# Patient Record
Sex: MALE | Race: WHITE | NOT HISPANIC OR LATINO | Employment: OTHER | URBAN - NONMETROPOLITAN AREA
[De-identification: names, ages, dates, MRNs, and addresses within clinical notes are randomized per-mention and may not be internally consistent; named-entity substitution may affect disease eponyms.]

---

## 2017-01-06 ENCOUNTER — HOSPITAL ENCOUNTER (EMERGENCY)
Facility: HOSPITAL | Age: 77
Discharge: HOME | End: 2017-01-06
Attending: EMERGENCY MEDICINE | Admitting: EMERGENCY MEDICINE

## 2017-01-06 ENCOUNTER — APPOINTMENT (OUTPATIENT)
Dept: ULTRASOUND IMAGING | Facility: HOSPITAL | Age: 77
End: 2017-01-06

## 2017-01-06 ENCOUNTER — APPOINTMENT (OUTPATIENT)
Dept: GENERAL RADIOLOGY | Facility: HOSPITAL | Age: 77
End: 2017-01-06

## 2017-01-06 VITALS
WEIGHT: 176.6 LBS | RESPIRATION RATE: 20 BRPM | HEIGHT: 73 IN | SYSTOLIC BLOOD PRESSURE: 191 MMHG | TEMPERATURE: 98.4 F | OXYGEN SATURATION: 94 % | DIASTOLIC BLOOD PRESSURE: 74 MMHG | HEART RATE: 61 BPM | BODY MASS INDEX: 23.4 KG/M2

## 2017-01-06 DIAGNOSIS — N43.3 HYDROCELE, LEFT: ICD-10-CM

## 2017-01-06 DIAGNOSIS — N45.1 EPIDIDYMITIS, LEFT: ICD-10-CM

## 2017-01-06 DIAGNOSIS — N50.812 LEFT TESTICULAR PAIN: Primary | ICD-10-CM

## 2017-01-06 LAB
BACTERIA UR QL AUTO: ABNORMAL /HPF
BILIRUB UR QL STRIP: NEGATIVE
CLARITY UR: ABNORMAL
COLOR UR: YELLOW
GLUCOSE UR STRIP-MCNC: NEGATIVE MG/DL
HGB UR QL STRIP.AUTO: ABNORMAL
HYALINE CASTS UR QL AUTO: ABNORMAL /LPF
KETONES UR QL STRIP: NEGATIVE
LEUKOCYTE ESTERASE UR QL STRIP.AUTO: ABNORMAL
NITRITE UR QL STRIP: POSITIVE
PH UR STRIP.AUTO: <=5 [PH] (ref 5–8)
PROT UR QL STRIP: ABNORMAL
RBC # UR: ABNORMAL /HPF
REF LAB TEST METHOD: ABNORMAL
SP GR UR STRIP: 1.02 (ref 1–1.03)
SQUAMOUS #/AREA URNS HPF: ABNORMAL /HPF
UROBILINOGEN UR QL STRIP: ABNORMAL
WBC UR QL AUTO: ABNORMAL /HPF

## 2017-01-06 PROCEDURE — 87186 SC STD MICRODIL/AGAR DIL: CPT | Performed by: EMERGENCY MEDICINE

## 2017-01-06 PROCEDURE — 87088 URINE BACTERIA CULTURE: CPT | Performed by: EMERGENCY MEDICINE

## 2017-01-06 PROCEDURE — 74450 X-RAY URETHRA/BLADDER: CPT

## 2017-01-06 PROCEDURE — 87086 URINE CULTURE/COLONY COUNT: CPT | Performed by: EMERGENCY MEDICINE

## 2017-01-06 PROCEDURE — 81001 URINALYSIS AUTO W/SCOPE: CPT | Performed by: EMERGENCY MEDICINE

## 2017-01-06 PROCEDURE — 76870 US EXAM SCROTUM: CPT

## 2017-01-06 PROCEDURE — 99283 EMERGENCY DEPT VISIT LOW MDM: CPT

## 2017-01-06 PROCEDURE — 25010000002 CEFTRIAXONE PER 250 MG: Performed by: EMERGENCY MEDICINE

## 2017-01-06 PROCEDURE — 96372 THER/PROPH/DIAG INJ SC/IM: CPT

## 2017-01-06 RX ORDER — CEFTRIAXONE 500 MG/1
250 INJECTION, POWDER, FOR SOLUTION INTRAMUSCULAR; INTRAVENOUS ONCE
Status: COMPLETED | OUTPATIENT
Start: 2017-01-06 | End: 2017-01-06

## 2017-01-06 RX ORDER — SULFAMETHOXAZOLE AND TRIMETHOPRIM 800; 160 MG/1; MG/1
1 TABLET ORAL 2 TIMES DAILY
Qty: 14 TABLET | Refills: 0 | Status: SHIPPED | OUTPATIENT
Start: 2017-01-06 | End: 2017-01-13

## 2017-01-06 RX ORDER — DOXYCYCLINE 100 MG/1
100 CAPSULE ORAL 2 TIMES DAILY
Qty: 20 CAPSULE | Refills: 0 | Status: SHIPPED | OUTPATIENT
Start: 2017-01-06 | End: 2017-01-16

## 2017-01-06 RX ORDER — KETOROLAC TROMETHAMINE 10 MG/1
10 TABLET, FILM COATED ORAL EVERY 6 HOURS PRN
Qty: 8 TABLET | Refills: 0 | Status: SHIPPED | OUTPATIENT
Start: 2017-01-06 | End: 2017-01-20

## 2017-01-06 RX ORDER — LIDOCAINE HYDROCHLORIDE 10 MG/ML
0.9 INJECTION, SOLUTION EPIDURAL; INFILTRATION; INTRACAUDAL; PERINEURAL ONCE
Status: DISCONTINUED | OUTPATIENT
Start: 2017-01-06 | End: 2017-01-06 | Stop reason: HOSPADM

## 2017-01-06 RX ADMIN — CEFTRIAXONE SODIUM 250 MG: 500 INJECTION, POWDER, FOR SOLUTION INTRAMUSCULAR; INTRAVENOUS at 20:50

## 2017-01-06 NOTE — ED NOTES
Pt provided with urinal and informed that ultrasound has been called in     Cindi Mueller, DEREK  01/06/17 [id number]

## 2017-01-06 NOTE — ED PROVIDER NOTES
"Subjective   HPI Comments: Patient is a 76-year-old male who reports on Wednesday (01/04/2017) at 4 AM he was trying to sit down in a chair but he missed the chair and \"crushed\" his left testicle.  He reports he has had left testicular pain since then.  He reports he began having swelling of the left testicle this a.m.  He reports this causes him to have pain to urinate.      History provided by:  Patient      Review of Systems   Constitutional: Negative.    HENT: Negative.    Eyes: Negative.    Respiratory: Negative.    Cardiovascular: Negative.    Gastrointestinal: Negative.    Endocrine: Negative.    Genitourinary: Positive for scrotal swelling and testicular pain.        Pain with urination.   Musculoskeletal: Negative.    Skin: Positive for rash (Chronic facial rash).   Allergic/Immunologic: Negative.    Neurological: Negative.    Hematological: Negative.    Psychiatric/Behavioral: Negative.    All other systems reviewed and are negative.      Past Medical History   Diagnosis Date   • Coronary artery disease        No Known Allergies    History reviewed. No pertinent past surgical history.    History reviewed. No pertinent family history.    Social History     Social History   • Marital status:      Spouse name: N/A   • Number of children: N/A   • Years of education: N/A     Social History Main Topics   • Smoking status: Never Smoker   • Smokeless tobacco: None   • Alcohol use Defer   • Drug use: Defer   • Sexual activity: Not Asked     Other Topics Concern   • None     Social History Narrative   • None           Objective   Physical Exam   Constitutional: He appears well-developed and well-nourished.   HENT:   Head: Normocephalic and atraumatic.   Right Ear: External ear normal.   Left Ear: External ear normal.   Nose: Nose normal.   Mouth/Throat: Oropharynx is clear and moist.   Eyes: Pupils are equal, round, and reactive to light. Right eye exhibits no discharge. Left eye exhibits no discharge. "   Neck: Normal range of motion. Neck supple.   Cardiovascular: Normal rate, regular rhythm, normal heart sounds and intact distal pulses.    Pulmonary/Chest: Effort normal and breath sounds normal.   Abdominal: Soft. He exhibits no distension. There is no tenderness.   Genitourinary: Penis normal.   Genitourinary Comments: Left testicle is swollen and painful to touch.  Uncircumcised penis.   Musculoskeletal: Normal range of motion. He exhibits no deformity.   Neurological: He is alert.   Skin: Skin is warm. Rash (Rash on face.) noted.   Psychiatric: He has a normal mood and affect. Judgment normal.   Nursing note and vitals reviewed.      Procedures         ED Course  ED Course   Comment By Time   Discussed patient's case with Dr. Clay (Urologist on call) and he recommends the patient have a retrograde urethrogram--and if that is WNL then discharge the patient with anti-inflammatory & antibiotic medications. Rolando Parks MD 01/06 1849                  MDM  Number of Diagnoses or Management Options  Epididymitis, left: new and requires workup  Hydrocele, left: minor  Left testicular pain: new and requires workup     Amount and/or Complexity of Data Reviewed  Clinical lab tests: ordered and reviewed  Tests in the radiology section of CPT®: ordered and reviewed  Discuss the patient with other providers: yes    Risk of Complications, Morbidity, and/or Mortality  Presenting problems: low  Diagnostic procedures: low  Management options: low    Patient Progress  Patient progress: stable      Final diagnoses:   Left testicular pain   Epididymitis, left   Hydrocele, left            Rolando Parks MD  01/06/17 2029

## 2017-01-07 NOTE — ED NOTES
Radiology called in regards to [place] order, radiology stated that dr pitts would have to call the radiologist about order. Dr pitts notified     Cindi Mueller RN  01/06/17 0037

## 2017-01-08 LAB — BACTERIA SPEC AEROBE CULT: ABNORMAL

## 2017-01-20 ENCOUNTER — OFFICE VISIT (OUTPATIENT)
Dept: UROLOGY | Facility: CLINIC | Age: 77
End: 2017-01-20

## 2017-01-20 VITALS
BODY MASS INDEX: 23.46 KG/M2 | SYSTOLIC BLOOD PRESSURE: 128 MMHG | HEIGHT: 73 IN | WEIGHT: 177 LBS | DIASTOLIC BLOOD PRESSURE: 50 MMHG | TEMPERATURE: 98 F

## 2017-01-20 DIAGNOSIS — N43.3 HYDROCELE, UNSPECIFIED HYDROCELE TYPE: Primary | ICD-10-CM

## 2017-01-20 DIAGNOSIS — N40.1 BENIGN NON-NODULAR PROSTATIC HYPERPLASIA WITH LOWER URINARY TRACT SYMPTOMS: ICD-10-CM

## 2017-01-20 LAB
BILIRUB BLD-MCNC: ABNORMAL MG/DL
CLARITY, POC: CLEAR
COLOR UR: YELLOW
GLUCOSE UR STRIP-MCNC: NEGATIVE MG/DL
KETONES UR QL: ABNORMAL
LEUKOCYTE EST, POC: NEGATIVE
NITRITE UR-MCNC: NEGATIVE MG/ML
PH UR: 5.5 [PH] (ref 5–8)
PROT UR STRIP-MCNC: ABNORMAL MG/DL
RBC # UR STRIP: NEGATIVE /UL
SP GR UR: 1.03 (ref 1–1.03)
UROBILINOGEN UR QL: ABNORMAL

## 2017-01-20 PROCEDURE — 81003 URINALYSIS AUTO W/O SCOPE: CPT | Performed by: UROLOGY

## 2017-01-20 PROCEDURE — 51798 US URINE CAPACITY MEASURE: CPT | Performed by: UROLOGY

## 2017-01-20 PROCEDURE — 99204 OFFICE O/P NEW MOD 45 MIN: CPT | Performed by: UROLOGY

## 2017-01-20 RX ORDER — LOSARTAN POTASSIUM 50 MG/1
50 TABLET ORAL DAILY
COMMUNITY

## 2017-01-20 RX ORDER — PANTOPRAZOLE SODIUM 40 MG/1
40 TABLET, DELAYED RELEASE ORAL DAILY
COMMUNITY

## 2017-01-20 RX ORDER — SIMVASTATIN 40 MG
40 TABLET ORAL NIGHTLY
COMMUNITY

## 2017-01-20 RX ORDER — ASPIRIN 325 MG
325 TABLET ORAL DAILY
COMMUNITY
End: 2019-05-20

## 2017-01-20 RX ORDER — CITALOPRAM 40 MG/1
40 TABLET ORAL DAILY
COMMUNITY

## 2017-01-20 RX ORDER — LEVOTHYROXINE SODIUM 0.12 MG/1
125 TABLET ORAL DAILY
COMMUNITY

## 2017-01-20 RX ORDER — TAMSULOSIN HYDROCHLORIDE 0.4 MG/1
1 CAPSULE ORAL DAILY
Qty: 30 CAPSULE | Refills: 11 | Status: SHIPPED | OUTPATIENT
Start: 2017-01-20

## 2017-01-20 RX ORDER — ATENOLOL 50 MG/1
50 TABLET ORAL DAILY
COMMUNITY

## 2017-01-20 RX ORDER — TERAZOSIN 5 MG/1
5 CAPSULE ORAL NIGHTLY
COMMUNITY
End: 2017-01-20

## 2017-01-20 NOTE — PROGRESS NOTES
Mr. Riggs is 76 y.o. male    Chief Complaint   Patient presents with   • Testicle Pain       Testicle Pain   The patient's primary symptoms include testicular pain. The patient's pertinent negatives include no penile pain. This is a new problem. The current episode started more than 1 month ago. The problem occurs constantly. The problem has been unchanged. The pain is medium. Associated symptoms include frequency, headaches and urgency. Pertinent negatives include no abdominal pain, chest pain, chills, coughing, dysuria, fever, rash, shortness of breath or sore throat. There is a testicular injury. The problem affects the left side. The injury mechanism was a direct blow to genitals. The testicular pain affects the left testicle. There is swelling in the left testicle. The color of the testicles is normal. The symptoms are aggravated by activity and tactile pressure. He has tried antibiotics for the symptoms. The treatment provided no relief. He is not sexually active. His past medical history is significant for BPH.   Benign Prostatic Hypertrophy   This is a chronic problem. The current episode started more than 1 year ago. The problem has been gradually worsening since onset. Irritative symptoms include frequency and urgency. Obstructive symptoms include a weak stream. Pertinent negatives include no chills, dysuria or hematuria. AUA score is 8-19 (17). He is not sexually active. Nothing aggravates the symptoms. Past treatments include terazosin. The treatment provided mild relief. He has been using treatment for 2 or more years.       The following portions of the patient's history were reviewed and updated as appropriate: allergies, current medications, past family history, past medical history, past social history, past surgical history and problem list.    Review of Systems   Constitutional: Negative for chills and fever.   HENT: Negative for congestion and sore throat.    Eyes: Negative for pain and  itching.   Respiratory: Negative for cough and shortness of breath.    Cardiovascular: Negative for chest pain.   Gastrointestinal: Negative for abdominal pain, anal bleeding and blood in stool.   Endocrine: Negative for cold intolerance and heat intolerance.   Genitourinary: Positive for frequency, genital sores, testicular pain and urgency. Negative for difficulty urinating, dysuria, hematuria, penile pain and penile swelling.   Musculoskeletal: Negative for back pain and neck pain.   Skin: Negative for color change and rash.   Allergic/Immunologic: Negative for food allergies.   Neurological: Positive for headaches. Negative for dizziness.   Hematological: Does not bruise/bleed easily.   Psychiatric/Behavioral: Negative for confusion and sleep disturbance.         Current Outpatient Prescriptions:   •  aspirin 325 MG tablet, Take 325 mg by mouth Daily., Disp: , Rfl:   •  atenolol (TENORMIN) 50 MG tablet, Take 50 mg by mouth Daily., Disp: , Rfl:   •  citalopram (CeleXA) 40 MG tablet, Take 40 mg by mouth Daily., Disp: , Rfl:   •  levothyroxine (SYNTHROID, LEVOTHROID) 125 MCG tablet, Take 125 mcg by mouth Daily., Disp: , Rfl:   •  losartan (COZAAR) 50 MG tablet, Take 50 mg by mouth Daily., Disp: , Rfl:   •  pantoprazole (PROTONIX) 40 MG EC tablet, Take 40 mg by mouth Daily., Disp: , Rfl:   •  simvastatin (ZOCOR) 40 MG tablet, Take 40 mg by mouth Every Night., Disp: , Rfl:   •  tamsulosin (FLOMAX) 0.4 MG capsule 24 hr capsule, Take 1 capsule by mouth Daily., Disp: 30 capsule, Rfl: 11    Past Medical History   Diagnosis Date   • Coronary artery disease    • Hypertension        Past Surgical History   Procedure Laterality Date   • Gallbladder surgery     • Back surgery         Social History     Social History   • Marital status:      Spouse name: N/A   • Number of children: N/A   • Years of education: N/A     Social History Main Topics   • Smoking status: Former Smoker   • Smokeless tobacco: None   • Alcohol  "use Yes      Comment: rarely    • Drug use: Defer   • Sexual activity: Not Asked     Other Topics Concern   • None     Social History Narrative       Family History   Problem Relation Age of Onset   • No Known Problems Father    • No Known Problems Mother        Objective    Visit Vitals   • /50   • Temp 98 °F (36.7 °C)   • Ht 73\" (185.4 cm)   • Wt 177 lb (80.3 kg)   • BMI 23.35 kg/m2       Physical Exam   Constitutional: He is oriented to person, place, and time. He appears well-developed and well-nourished. No distress.   HENT:   Nose: Nose normal.   Neck: Normal range of motion. Neck supple. No tracheal deviation present. No thyromegaly present.   Cardiovascular: Normal rate, regular rhythm and intact distal pulses.    No significant edema or tenderness    Pulmonary/Chest: Breath sounds normal. No accessory muscle usage. No respiratory distress.   Abdominal: Soft. Bowel sounds are normal. He exhibits no distension, no ascites and no mass. There is no hepatosplenomegaly. There is no tenderness. There is no rebound, no guarding and no CVA tenderness. No hernia.   Stool specimen is not indicated for my portion of the exam   Genitourinary: Penis normal. Rectal exam shows no mass, no tenderness, anal tone normal and guaiac negative stool. Tender: no nodules. Right testis shows no mass, no swelling and no tenderness. Left testis shows swelling and tenderness. Left testis shows no mass. No penile tenderness (no lesion or deformities).   Genitourinary Comments:  The urethral meatus normal in position without evidence of stricture.  Right epididymis and vasa within normal limits.  Left epididymis is not palpable due to the hydrocele.  He does have some tenderness along the cord.  Anus and perineum without mass or tenderness. The seminal vesicle are without mass or enlargement. The prostate is approximately 50 ml. It is Symmetric, with a Soft consistency. There are no nodules present.      Lymphadenopathy:     He has " no cervical adenopathy. No inguinal adenopathy noted on the right or left side.        Right: No inguinal adenopathy present.        Left: No inguinal adenopathy present.   Neurological: He is alert and oriented to person, place, and time.   Skin: Skin is warm and dry. No rash noted. He is not diaphoretic. No pallor.   Psychiatric: He has a normal mood and affect. His behavior is normal.   Vitals reviewed.      Admission on 01/06/2017, Discharged on 01/06/2017   Component Date Value Ref Range Status   • Color, UA 01/06/2017 Yellow  Yellow, Straw Final   • Appearance, UA 01/06/2017 Turbid* Clear Final   • pH, UA 01/06/2017 <=5.0  5.0 - 8.0 Final   • Specific Gravity, UA 01/06/2017 1.020  1.005 - 1.030 Final   • Glucose, UA 01/06/2017 Negative  Negative Final   • Ketones, UA 01/06/2017 Negative  Negative Final   • Bilirubin, UA 01/06/2017 Negative  Negative Final   • Blood, UA 01/06/2017 Large (3+)* Negative Final   • Protein, UA 01/06/2017 30 mg/dL (1+)* Negative Final   • Leuk Esterase, UA 01/06/2017 Large (3+)* Negative Final   • Nitrite, UA 01/06/2017 Positive* Negative Final   • Urobilinogen, UA 01/06/2017 1.0 E.U./dL  0.2 - 1.0 E.U./dL Final   • RBC, UA 01/06/2017 None Seen  None Seen /HPF Final   • WBC, UA 01/06/2017 Too Numerous to Count* None Seen /HPF Final   • Bacteria, UA 01/06/2017 Trace* None Seen /HPF Final   • Squamous Epithelial Cells, UA 01/06/2017 None Seen  None Seen, 0-2 /HPF Final   • Hyaline Casts, UA 01/06/2017 None Seen  None Seen /LPF Final   • Methodology 01/06/2017 Automated Microscopy   Final   • Urine Culture 01/06/2017 >100,000 CFU/mL Escherichia coli*  Final       Results for orders placed or performed in visit on 01/20/17   POC Urinalysis Dipstick, Automated   Result Value Ref Range    Color Yellow Yellow, Straw, Dark Yellow, Chanel    Clarity, UA Clear Clear    Glucose, UA Negative Negative, 1000 mg/dL (3+) mg/dL    Bilirubin Small (1+) (A) Negative    Ketones, UA Trace (A) Negative     Specific Gravity  1.030 1.005 - 1.030    Blood, UA Negative Negative    pH, Urine 5.5 5.0 - 8.0    Protein,  mg/dL (A) Negative mg/dL    Urobilinogen, UA 1 mg/dL (A) Normal    Leukocytes Negative Negative    Nitrite, UA Negative Negative     Assessment and Plan    Rajinder was seen today for testicle pain.    Diagnoses and all orders for this visit:    Hydrocele, unspecified hydrocele type  -     POC Urinalysis Dipstick, Automated  I reviewed his outside imaging as below.  I do believe what has happened to him is that he had a injury to the left scrotum for fall on a chair which caused a an inflammatory epididymal orchitis with a resulting hydrocele.  He continues to have pain despite antibiotics.  I discussed with him that I do not think antibiotics will be helpful for him in this situation and that he needs to be taking nonsteroidal anti-inflammatories.  I have given her instructions to take ibuprofen 400 mg 3 times a day for the next 3 weeks.  As a GI prophylaxis he will take Pepcid 20 mg daily.  I do think this will help with his pain.  More than likely the hydrocele will not resolve and if this becomes an issue with him we can deal with this surgically in the future    Benign non-nodular prostatic hyperplasia with lower urinary tract symptoms  -     tamsulosin (FLOMAX) 0.4 MG capsule 24 hr capsule; Take 1 capsule by mouth Daily.  He is still having some complaints of lower urinary tract symptoms which I think are due to his BPH.  He has been on Turner in for quite some time.  He states that he does occasionally get some dizziness and some somewhat hesitant to go up on this as this is a nonselective alpha-blocker.  I would like to put him on a more specific selective alpha A1A blocker and will start him on Flomax.  He does empty his bladder well with a bladder scan of 0    He and I discussed BPH today including the pathophysiology, diagnosis, and management. The role of PSA in management of PSA is  discussed.  The patient understands the purpose of a uroflow, post void residual and the need for cystoscopy. We discussed the role of Alpha blockers, 5-Alpha redcuctase inhibitors, and anticholinergics. Minimally invasive techniques including TUNA, TUMT, Interstitial laser, and WIT are considered. TUIP, TURP, & Laser ablation are also explained as more invasive techniques. TURP is acknowledged to be the gold standard for surgical management. Behavioral, dietary, and herbal therapy are also discussed pointing out the limited available data for the latter. Based upon his symptomatology, we have elected to begin a trial of alpha blockade. The risks of orthostasis, central mediated dizziness, ejaculatory dysfunction, reflux, & rhinitis are discussed with the patient.     Scrotal ultrasound independent review    The scrotal ultrasound is available for me to review.  Treatment recommendations require an independent review.  This film has been reviewed by the radiologist to determine any non urologic abnormalities that are presents. Results are as follows:    RIGHT    Testis:  Normal in size and echotexture, without focal lesion    Epididymis:  Normal in size and echotexture, without focal lesion    Hydrocele:  Small physiologic hydrocele    Varicocele:  No evidence of varicocele      Left    Testis:  Heterogenous echotexture consistent with orchitis    Epididymis:  Heterogeneous echotecture and mild enlargement consistent with epididymitis    Hydrocele:  Moderate hydrocele    Varicocele:  No evidence of varicocele        EMR Dragon/Transcription disclaimer:  Much of this encounter note is an electronic transcription/translation of spoken language to printed text. The electronic translation of spoken language may permit erroneous, or at times, nonsensical words or phrases to be inadvertently transcribed; although I have reviewed the note for such errors, some may still exist.     Estimation of residual urine via abdominal  ultrasound  Residual Urine: 0 ml  Indication:   Position: Supine  Examination: Incremental scanning of the suprapubic area using 3 MHz transducer using copious amounts of acoustic gel.   Findings: An anechoic area was demonstrated which represented the bladder, with measurement of residual urine as noted. I inspected this myself. In that the residual urine was stable or insignificant, no treatment will be necessary at this time.

## 2017-01-20 NOTE — LETTER
January 20, 2017     LUCIANO Baez  [address]  [place] [address]    Patient: Rajinder Riggs   YOB: 1940   Date of Visit: 1/20/2017       Dear LUCIANO Delgado:    Thank you for referring Rajinder Riggs to me for evaluation. Below are the relevant portions of my assessment and plan of care.    If you have questions, please do not hesitate to call me. I look forward to following Rajinder along with you.         Sincerely,        Ze Clay MD        CC: No Recipients  Ze Clay MD  1/20/2017  4:13 PM  Signed  Mr. Riggs is 76 y.o. male    Chief Complaint   Patient presents with   • Testicle Pain       Testicle Pain   The patient's primary symptoms include testicular pain. The patient's pertinent negatives include no penile pain. This is a new problem. The current episode started more than 1 month ago. The problem occurs constantly. The problem has been unchanged. The pain is medium. Associated symptoms include frequency, headaches and urgency. Pertinent negatives include no abdominal pain, chest pain, chills, coughing, dysuria, fever, rash, shortness of breath or sore throat. There is a testicular injury. The problem affects the left side. The injury mechanism was a direct blow to genitals. The testicular pain affects the left testicle. There is swelling in the left testicle. The color of the testicles is normal. The symptoms are aggravated by activity and tactile pressure. He has tried antibiotics for the symptoms. The treatment provided no relief. He is not sexually active. His past medical history is significant for BPH.   Benign Prostatic Hypertrophy   This is a chronic problem. The current episode started more than 1 year ago. The problem has been gradually worsening since onset. Irritative symptoms include frequency and urgency. Obstructive symptoms include a weak stream. Pertinent negatives include no chills, dysuria or hematuria. AUA score is 8-19  (17). He is not sexually active. Nothing aggravates the symptoms. Past treatments include terazosin. The treatment provided mild relief. He has been using treatment for 2 or more years.       The following portions of the patient's history were reviewed and updated as appropriate: allergies, current medications, past family history, past medical history, past social history, past surgical history and problem list.    Review of Systems   Constitutional: Negative for chills and fever.   HENT: Negative for congestion and sore throat.    Eyes: Negative for pain and itching.   Respiratory: Negative for cough and shortness of breath.    Cardiovascular: Negative for chest pain.   Gastrointestinal: Negative for abdominal pain, anal bleeding and blood in stool.   Endocrine: Negative for cold intolerance and heat intolerance.   Genitourinary: Positive for frequency, genital sores, testicular pain and urgency. Negative for difficulty urinating, dysuria, hematuria, penile pain and penile swelling.   Musculoskeletal: Negative for back pain and neck pain.   Skin: Negative for color change and rash.   Allergic/Immunologic: Negative for food allergies.   Neurological: Positive for headaches. Negative for dizziness.   Hematological: Does not bruise/bleed easily.   Psychiatric/Behavioral: Negative for confusion and sleep disturbance.         Current Outpatient Prescriptions:   •  aspirin 325 MG tablet, Take 325 mg by mouth Daily., Disp: , Rfl:   •  atenolol (TENORMIN) 50 MG tablet, Take 50 mg by mouth Daily., Disp: , Rfl:   •  citalopram (CeleXA) 40 MG tablet, Take 40 mg by mouth Daily., Disp: , Rfl:   •  levothyroxine (SYNTHROID, LEVOTHROID) 125 MCG tablet, Take 125 mcg by mouth Daily., Disp: , Rfl:   •  losartan (COZAAR) 50 MG tablet, Take 50 mg by mouth Daily., Disp: , Rfl:   •  pantoprazole (PROTONIX) 40 MG EC tablet, Take 40 mg by mouth Daily., Disp: , Rfl:   •  simvastatin (ZOCOR) 40 MG tablet, Take 40 mg by mouth Every Night.,  "Disp: , Rfl:   •  tamsulosin (FLOMAX) 0.4 MG capsule 24 hr capsule, Take 1 capsule by mouth Daily., Disp: 30 capsule, Rfl: 11    Past Medical History   Diagnosis Date   • Coronary artery disease    • Hypertension        Past Surgical History   Procedure Laterality Date   • Gallbladder surgery     • Back surgery         Social History     Social History   • Marital status:      Spouse name: N/A   • Number of children: N/A   • Years of education: N/A     Social History Main Topics   • Smoking status: Former Smoker   • Smokeless tobacco: None   • Alcohol use Yes      Comment: rarely    • Drug use: Defer   • Sexual activity: Not Asked     Other Topics Concern   • None     Social History Narrative       Family History   Problem Relation Age of Onset   • No Known Problems Father    • No Known Problems Mother        Objective    Visit Vitals   • /50   • Temp 98 °F (36.7 °C)   • Ht 73\" (185.4 cm)   • Wt 177 lb (80.3 kg)   • BMI 23.35 kg/m2       Physical Exam   Constitutional: He is oriented to person, place, and time. He appears well-developed and well-nourished. No distress.   HENT:   Nose: Nose normal.   Neck: Normal range of motion. Neck supple. No tracheal deviation present. No thyromegaly present.   Cardiovascular: Normal rate, regular rhythm and intact distal pulses.    No significant edema or tenderness    Pulmonary/Chest: Breath sounds normal. No accessory muscle usage. No respiratory distress.   Abdominal: Soft. Bowel sounds are normal. He exhibits no distension, no ascites and no mass. There is no hepatosplenomegaly. There is no tenderness. There is no rebound, no guarding and no CVA tenderness. No hernia.   Stool specimen is not indicated for my portion of the exam   Genitourinary: Penis normal. Rectal exam shows no mass, no tenderness, anal tone normal and guaiac negative stool. Tender: no nodules. Right testis shows no mass, no swelling and no tenderness. Left testis shows swelling and tenderness. " Left testis shows no mass. No penile tenderness (no lesion or deformities).   Genitourinary Comments:  The urethral meatus normal in position without evidence of stricture.  Right epididymis and vasa within normal limits.  Left epididymis is not palpable due to the hydrocele.  He does have some tenderness along the cord.  Anus and perineum without mass or tenderness. The seminal vesicle are without mass or enlargement. The prostate is approximately 50 ml. It is Symmetric, with a Soft consistency. There are no nodules present.      Lymphadenopathy:     He has no cervical adenopathy. No inguinal adenopathy noted on the right or left side.        Right: No inguinal adenopathy present.        Left: No inguinal adenopathy present.   Neurological: He is alert and oriented to person, place, and time.   Skin: Skin is warm and dry. No rash noted. He is not diaphoretic. No pallor.   Psychiatric: He has a normal mood and affect. His behavior is normal.   Vitals reviewed.      Admission on 01/06/2017, Discharged on 01/06/2017   Component Date Value Ref Range Status   • Color, UA 01/06/2017 Yellow  Yellow, Straw Final   • Appearance, UA 01/06/2017 Turbid* Clear Final   • pH, UA 01/06/2017 <=5.0  5.0 - 8.0 Final   • Specific Gravity, UA 01/06/2017 1.020  1.005 - 1.030 Final   • Glucose, UA 01/06/2017 Negative  Negative Final   • Ketones, UA 01/06/2017 Negative  Negative Final   • Bilirubin, UA 01/06/2017 Negative  Negative Final   • Blood, UA 01/06/2017 Large (3+)* Negative Final   • Protein, UA 01/06/2017 30 mg/dL (1+)* Negative Final   • Leuk Esterase, UA 01/06/2017 Large (3+)* Negative Final   • Nitrite, UA 01/06/2017 Positive* Negative Final   • Urobilinogen, UA 01/06/2017 1.0 E.U./dL  0.2 - 1.0 E.U./dL Final   • RBC, UA 01/06/2017 None Seen  None Seen /HPF Final   • WBC, UA 01/06/2017 Too Numerous to Count* None Seen /HPF Final   • Bacteria, UA 01/06/2017 Trace* None Seen /HPF Final   • Squamous Epithelial Cells, UA  01/06/2017 None Seen  None Seen, 0-2 /HPF Final   • Hyaline Casts, UA 01/06/2017 None Seen  None Seen /LPF Final   • Methodology 01/06/2017 Automated Microscopy   Final   • Urine Culture 01/06/2017 >100,000 CFU/mL Escherichia coli*  Final       Results for orders placed or performed in visit on 01/20/17   POC Urinalysis Dipstick, Automated   Result Value Ref Range    Color Yellow Yellow, Straw, Dark Yellow, Chanel    Clarity, UA Clear Clear    Glucose, UA Negative Negative, 1000 mg/dL (3+) mg/dL    Bilirubin Small (1+) (A) Negative    Ketones, UA Trace (A) Negative    Specific Gravity  1.030 1.005 - 1.030    Blood, UA Negative Negative    pH, Urine 5.5 5.0 - 8.0    Protein,  mg/dL (A) Negative mg/dL    Urobilinogen, UA 1 mg/dL (A) Normal    Leukocytes Negative Negative    Nitrite, UA Negative Negative     Assessment and Plan    Rajinder was seen today for testicle pain.    Diagnoses and all orders for this visit:    Hydrocele, unspecified hydrocele type  -     POC Urinalysis Dipstick, Automated  I reviewed his outside imaging as below.  I do believe what has happened to him is that he had a injury to the left scrotum for fall on a chair which caused a an inflammatory epididymal orchitis with a resulting hydrocele.  He continues to have pain despite antibiotics.  I discussed with him that I do not think antibiotics will be helpful for him in this situation and that he needs to be taking nonsteroidal anti-inflammatories.  I have given her instructions to take ibuprofen 400 mg 3 times a day for the next 3 weeks.  As a GI prophylaxis he will take Pepcid 20 mg daily.  I do think this will help with his pain.  More than likely the hydrocele will not resolve and if this becomes an issue with him we can deal with this surgically in the future    Benign non-nodular prostatic hyperplasia with lower urinary tract symptoms  -     tamsulosin (FLOMAX) 0.4 MG capsule 24 hr capsule; Take 1 capsule by mouth Daily.  He is still  having some complaints of lower urinary tract symptoms which I think are due to his BPH.  He has been on Turner in for quite some time.  He states that he does occasionally get some dizziness and some somewhat hesitant to go up on this as this is a nonselective alpha-blocker.  I would like to put him on a more specific selective alpha A1A blocker and will start him on Flomax.  He does empty his bladder well with a bladder scan of 0    He and I discussed BPH today including the pathophysiology, diagnosis, and management. The role of PSA in management of PSA is discussed.  The patient understands the purpose of a uroflow, post void residual and the need for cystoscopy. We discussed the role of Alpha blockers, 5-Alpha redcuctase inhibitors, and anticholinergics. Minimally invasive techniques including TUNA, TUMT, Interstitial laser, and WIT are considered. TUIP, TURP, & Laser ablation are also explained as more invasive techniques. TURP is acknowledged to be the gold standard for surgical management. Behavioral, dietary, and herbal therapy are also discussed pointing out the limited available data for the latter. Based upon his symptomatology, we have elected to begin a trial of alpha blockade. The risks of orthostasis, central mediated dizziness, ejaculatory dysfunction, reflux, & rhinitis are discussed with the patient.     Scrotal ultrasound independent review    The scrotal ultrasound is available for me to review.  Treatment recommendations require an independent review.  This film has been reviewed by the radiologist to determine any non urologic abnormalities that are presents. Results are as follows:    RIGHT    Testis:  Normal in size and echotexture, without focal lesion    Epididymis:  Normal in size and echotexture, without focal lesion    Hydrocele:  Small physiologic hydrocele    Varicocele:  No evidence of varicocele      Left    Testis:  Heterogenous echotexture consistent with  orchitis    Epididymis:  Heterogeneous echotecture and mild enlargement consistent with epididymitis    Hydrocele:  Moderate hydrocele    Varicocele:  No evidence of varicocele        EMR Dragon/Transcription disclaimer:  Much of this encounter note is an electronic transcription/translation of spoken language to printed text. The electronic translation of spoken language may permit erroneous, or at times, nonsensical words or phrases to be inadvertently transcribed; although I have reviewed the note for such errors, some may still exist.     Estimation of residual urine via abdominal ultrasound  Residual Urine: 0 ml  Indication:   Position: Supine  Examination: Incremental scanning of the suprapubic area using 3 MHz transducer using copious amounts of acoustic gel.   Findings: An anechoic area was demonstrated which represented the bladder, with measurement of residual urine as noted. I inspected this myself. In that the residual urine was stable or insignificant, no treatment will be necessary at this time.

## 2017-01-20 NOTE — MR AVS SNAPSHOT
Rajinder Riggs   1/20/2017 2:20 PM   Office Visit    Dept Phone:  [phone]   Encounter #:  [id number]    Provider:  Ze Clay MD   Department:  [facility] UROLOGY                Your Full Care Plan              Today's Medication Changes          These changes are accurate as of: 1/20/17  3:14 PM.  If you have any questions, ask your nurse or doctor.               New Medication(s)Ordered:     tamsulosin 0.4 MG capsule 24 hr capsule   Commonly known as:  FLOMAX   Take 1 capsule by mouth Daily.   Started by:  Ze Clay MD         Stop taking medication(s)listed here:     ketorolac 10 MG tablet   Commonly known as:  TORADOL   Stopped by:  Ze Clay MD           terazosin 5 MG capsule   Commonly known as:  HYTRIN   Stopped by:  Ze Clay MD                Where to Get Your Medications      You can get these medications from any pharmacy     Bring a paper prescription for each of these medications     tamsulosin 0.4 MG capsule 24 hr capsule                  Your Updated Medication List          This list is accurate as of: 1/20/17  3:14 PM.  Always use your most recent med list.                aspirin 325 MG tablet       atenolol 50 MG tablet   Commonly known as:  TENORMIN       citalopram 40 MG tablet   Commonly known as:  CeleXA       levothyroxine 125 MCG tablet   Commonly known as:  SYNTHROID, LEVOTHROID       losartan 50 MG tablet   Commonly known as:  COZAAR       pantoprazole 40 MG EC tablet   Commonly known as:  PROTONIX       simvastatin 40 MG tablet   Commonly known as:  ZOCOR       tamsulosin 0.4 MG capsule 24 hr capsule   Commonly known as:  FLOMAX   Take 1 capsule by mouth Daily.               We Performed the Following     POC Urinalysis Dipstick, Automated       You Were Diagnosed With        Codes Comments    Hydrocele, unspecified hydrocele type    -  Primary ICD-10-CM: N43.3  ICD-9-CM: 603.9     Benign non-nodular  "prostatic hyperplasia with lower urinary tract symptoms     ICD-10-CM: N40.1  ICD-9-CM: 600.91       Instructions     None    Patient Instructions History      Upcoming Appointments     Visit Type Date Time Department    NEW PATIENT 2017  2:20 PM [facility]    FOLLOW UP 2017  3:50 PM [facility]      MyChart Signup     [facility] "[employer] allows you to send messages to your doctor, view your test results, renew your prescriptions, schedule appointments, and more. To sign up, go to [employer] and click on the Sign Up Now link in the New User? box. Enter your "[employer] Activation Code exactly as it appears below along with the last four digits of your Social Security Number and your Date of Birth () to complete the sign-up process. If you do not sign up before the expiration date, you must request a new code.    "[employer] Activation Code: [email or web address]  Expires: 2017  8:22 PM    If you have questions, you can email [email or web address] or call [phone] to talk to our [employer] staff. Remember, "[employer] is NOT to be used for urgent needs. For medical emergencies, dial 911.               Other Info from Your Visit           Your Appointments     2017  3:50 PM CST   Follow Up with Ze Clay MD   [facility] (--)    [address] [place] [id number] [phone]           Arrive 15 minutes prior to appointment.              Allergies     No Known Allergies      Vital Signs     Blood Pressure Temperature Height Weight Body Mass Index Smoking Status    128/50 98 °F (36.7 °C) 73\" (185.4 cm) 177 lb (80.3 kg) 23.35 kg/m2 Former Smoker      Problems and Diagnoses Noted     Fluid collection next to testicle    -  Primary    Benign non-nodular prostatic hyperplasia with lower urinary tract symptoms          Results     POC Urinalysis Dipstick, Automated      Component Value Standard Range & Units    Color Yellow " Yellow, Straw, Dark Yellow, Chanel    Clarity, UA Clear Clear    Glucose, UA Negative Negative, 1000 mg/dL (3+) mg/dL    Bilirubin Small (1+) Negative    Ketones, UA Trace Negative    Specific Gravity  1.030 1.005 - 1.030    Blood, UA Negative Negative    pH, Urine 5.5 5.0 - 8.0    Protein,  mg/dL Negative mg/dL    Urobilinogen, UA 1 mg/dL Normal    Leukocytes Negative Negative    Nitrite, UA Negative Negative

## 2017-02-17 ENCOUNTER — OFFICE VISIT (OUTPATIENT)
Dept: UROLOGY | Facility: CLINIC | Age: 77
End: 2017-02-17

## 2017-02-17 VITALS
BODY MASS INDEX: 23.56 KG/M2 | SYSTOLIC BLOOD PRESSURE: 152 MMHG | DIASTOLIC BLOOD PRESSURE: 58 MMHG | HEIGHT: 73 IN | TEMPERATURE: 97.7 F | WEIGHT: 177.8 LBS

## 2017-02-17 DIAGNOSIS — N40.1 BENIGN NON-NODULAR PROSTATIC HYPERPLASIA WITH LOWER URINARY TRACT SYMPTOMS: ICD-10-CM

## 2017-02-17 DIAGNOSIS — N43.3 HYDROCELE, UNSPECIFIED HYDROCELE TYPE: Primary | ICD-10-CM

## 2017-02-17 LAB
BILIRUB BLD-MCNC: NEGATIVE MG/DL
CLARITY, POC: CLEAR
COLOR UR: YELLOW
GLUCOSE UR STRIP-MCNC: NEGATIVE MG/DL
KETONES UR QL: NEGATIVE
LEUKOCYTE EST, POC: NEGATIVE
NITRITE UR-MCNC: NEGATIVE MG/ML
PH UR: 5.5 [PH] (ref 5–8)
PROT UR STRIP-MCNC: ABNORMAL MG/DL
RBC # UR STRIP: NEGATIVE /UL
SP GR UR: 1.03 (ref 1–1.03)
UROBILINOGEN UR QL: NORMAL

## 2017-02-17 PROCEDURE — 99213 OFFICE O/P EST LOW 20 MIN: CPT | Performed by: UROLOGY

## 2017-02-17 PROCEDURE — 51798 US URINE CAPACITY MEASURE: CPT | Performed by: UROLOGY

## 2017-02-17 PROCEDURE — 81003 URINALYSIS AUTO W/O SCOPE: CPT | Performed by: UROLOGY

## 2017-02-17 NOTE — PROGRESS NOTES
Mr. Riggs is 76 y.o. male    Chief Complaint   Patient presents with   • Hydrocele, unspecified hydrocele type   • Benign Prostatic Hypertrophy       Benign Prostatic Hypertrophy   This is a chronic problem. The current episode started more than 1 year ago. The problem has been gradually improving since onset. Irritative symptoms include frequency and urgency. Obstructive symptoms include a weak stream. Obstructive symptoms do not include an intermittent stream or straining. Pertinent negatives include no chills or hematuria. Nothing aggravates the symptoms. Past treatments include tamsulosin. The treatment provided moderate relief. He has been using treatment for 1 to 6 months.     She also for follow-up on hydrocele.  This is located in the left hemiscrotum.  This was initially described as painful.  The pain was sharp and aching.  This is resolved over the last few weeks.  Severity is now 0 to mild.  Duration is been several weeks.  Modifying factors to this point and then adding ibuprofen.  He has no associated dysuria.    The following portions of the patient's history were reviewed and updated as appropriate: allergies, current medications, past family history, past medical history, past social history, past surgical history and problem list.    Review of Systems   Constitutional: Negative for chills and fever.   Gastrointestinal: Negative for abdominal pain, anal bleeding and blood in stool.   Genitourinary: Positive for frequency and urgency. Negative for difficulty urinating, flank pain, hematuria, penile pain and penile swelling.         Current Outpatient Prescriptions:   •  aspirin 325 MG tablet, Take 325 mg by mouth Daily., Disp: , Rfl:   •  atenolol (TENORMIN) 50 MG tablet, Take 50 mg by mouth Daily., Disp: , Rfl:   •  citalopram (CeleXA) 40 MG tablet, Take 40 mg by mouth Daily., Disp: , Rfl:   •  levothyroxine (SYNTHROID, LEVOTHROID) 125 MCG tablet, Take 125 mcg by mouth Daily., Disp: , Rfl:   •   "losartan (COZAAR) 50 MG tablet, Take 50 mg by mouth Daily., Disp: , Rfl:   •  pantoprazole (PROTONIX) 40 MG EC tablet, Take 40 mg by mouth Daily., Disp: , Rfl:   •  simvastatin (ZOCOR) 40 MG tablet, Take 40 mg by mouth Every Night., Disp: , Rfl:   •  tamsulosin (FLOMAX) 0.4 MG capsule 24 hr capsule, Take 1 capsule by mouth Daily., Disp: 30 capsule, Rfl: 11    Past Medical History   Diagnosis Date   • Coronary artery disease    • Hypertension        Past Surgical History   Procedure Laterality Date   • Gallbladder surgery     • Back surgery         Social History     Social History   • Marital status:      Spouse name: N/A   • Number of children: N/A   • Years of education: N/A     Social History Main Topics   • Smoking status: Former Smoker   • Smokeless tobacco: None   • Alcohol use Yes      Comment: rarely    • Drug use: Defer   • Sexual activity: Not Asked     Other Topics Concern   • None     Social History Narrative       Family History   Problem Relation Age of Onset   • No Known Problems Father    • No Known Problems Mother        Objective    Visit Vitals   • /58   • Temp 97.7 °F (36.5 °C)   • Ht 73\" (185.4 cm)   • Wt 177 lb 12.8 oz (80.6 kg)   • BMI 23.46 kg/m2       Physical Exam   Genitourinary:   Genitourinary Comments: Normal right testicle.  Mild swelling in the left hemiscrotum consistent with small hydrocele which is nontender.       Office Visit on 01/20/2017   Component Date Value Ref Range Status   • Color 01/20/2017 Yellow  Yellow, Straw, Dark Yellow, Chanel Final   • Clarity, UA 01/20/2017 Clear  Clear Final   • Glucose, UA 01/20/2017 Negative  Negative, 1000 mg/dL (3+) mg/dL Final   • Bilirubin 01/20/2017 Small (1+)* Negative Final   • Ketones, UA 01/20/2017 Trace* Negative Final   • Specific Gravity  01/20/2017 1.030  1.005 - 1.030 Final   • Blood, UA 01/20/2017 Negative  Negative Final   • pH, Urine 01/20/2017 5.5  5.0 - 8.0 Final   • Protein, POC 01/20/2017 100 mg/dL* Negative " mg/dL Final   • Urobilinogen, UA 01/20/2017 1 mg/dL* Normal Final   • Leukocytes 01/20/2017 Negative  Negative Final   • Nitrite, UA 01/20/2017 Negative  Negative Final       Results for orders placed or performed in visit on 02/17/17   POC Urinalysis Dipstick, Automated   Result Value Ref Range    Color Yellow Yellow, Straw, Dark Yellow, Chanel    Clarity, UA Clear Clear    Glucose, UA Negative Negative, 1000 mg/dL (3+) mg/dL    Bilirubin Negative Negative    Ketones, UA Negative Negative    Specific Gravity  1.030 1.005 - 1.030    Blood, UA Negative Negative    pH, Urine 5.5 5.0 - 8.0    Protein,  mg/dL (A) Negative mg/dL    Urobilinogen, UA Normal Normal    Leukocytes Negative Negative    Nitrite, UA Negative Negative     Assessment and Plan    Rajinder was seen today for hydrocele, unspecified hydrocele type and benign prostatic hypertrophy.    Diagnoses and all orders for this visit:    Hydrocele, unspecified hydrocele type  -     POC Urinalysis Dipstick, Automated  Discomfort from his hydrocele has improved.  He still has some mild swelling in the left hemiscrotum.  We discussed options including continuing to monitor versus surgical intervention or percutaneous drainage.  At this point this is not significant bothersome to him and his pain has resolved and he would like to continue to watch which I think is an appropriate decision.    Benign non-nodular prostatic hyperplasia with lower urinary tract symptoms  He did get a significant amount of benefit from the Flomax and denies any dizziness or lightheadedness.  We will continue with this medication I will see him back in 6 months.    EMR Dragon/Transcription disclaimer:  Much of this encounter note is an electronic transcription/translation of spoken language to printed text. The electronic translation of spoken language may permit erroneous, or at times, nonsensical words or phrases to be inadvertently transcribed; although I have reviewed the note for  such errors, some may still exist.     Estimation of residual urine via abdominal ultrasound  Residual Urine: 31 ml  Indication: bph  Position: Supine  Examination: Incremental scanning of the suprapubic area using 3 MHz transducer using copious amounts of acoustic gel.   Findings: An anechoic area was demonstrated which represented the bladder, with measurement of residual urine as noted. I inspected this myself. In that the residual urine was stable or insignificant, no treatment will be necessary at this time.

## 2018-01-10 ENCOUNTER — OFFICE VISIT (OUTPATIENT)
Dept: GASTROENTEROLOGY | Facility: CLINIC | Age: 78
End: 2018-01-10

## 2018-01-10 VITALS
HEIGHT: 73 IN | DIASTOLIC BLOOD PRESSURE: 90 MMHG | BODY MASS INDEX: 24.65 KG/M2 | OXYGEN SATURATION: 97 % | HEART RATE: 61 BPM | WEIGHT: 186 LBS | SYSTOLIC BLOOD PRESSURE: 140 MMHG

## 2018-01-10 DIAGNOSIS — R10.30 LOWER ABDOMINAL PAIN: ICD-10-CM

## 2018-01-10 DIAGNOSIS — R19.4 CHANGE IN BOWEL HABITS: Primary | ICD-10-CM

## 2018-01-10 DIAGNOSIS — Z71.6 TOBACCO ABUSE COUNSELING: ICD-10-CM

## 2018-01-10 DIAGNOSIS — I10 HTN (HYPERTENSION), BENIGN: ICD-10-CM

## 2018-01-10 PROCEDURE — 99204 OFFICE O/P NEW MOD 45 MIN: CPT | Performed by: CLINICAL NURSE SPECIALIST

## 2018-01-10 NOTE — PROGRESS NOTES
Rajinder Riggs  1940    1/10/2018  Chief Complaint   Patient presents with   • GI Problem     Abdominal pain and bloating     Subjective   HPI  Rajinder Riggs is a 77 y.o. male who presents with a complaint of change in bowels over the past few months. He says that he has urgency to go and loose explosive BMs that are new for him. He says he will do this in the morning after a regular BM and then it will subside. No new medications. No NSAIDs. He does consume dairy and artificial sweeteners. [facility] and [facility]. He has had some generalized lower abdominal pain that is a cramp fairly constant like it is irritable all of the time he says. He denies any BRBPR. No melena. No fever chills or sweats. No wt loss. No upper GI complaints. No nausea or vomiting No upper GI related issues. When asked where the pain is he points to the lower abdomen. He rates it a 4 out of 10. No aggravating or alleviating factors.    Past Medical History:   Diagnosis Date   • Coronary artery disease    • Depression    • Diabetes mellitus    • Disease of thyroid gland    • Hypertension      Past Surgical History:   Procedure Laterality Date   • BACK SURGERY     • CAROTID ARTERY - SUBCLAVIAN ARTERY BYPASS GRAFT     • CHOLECYSTECTOMY     • COLONOSCOPY  09/28/2012    Normal exam   • GALLBLADDER SURGERY       Outpatient Prescriptions Marked as Taking for the 1/10/18 encounter (Office Visit) with BRIANA Sullivan   Medication Sig Dispense Refill   • aspirin 325 MG tablet Take 325 mg by mouth Daily.     • atenolol (TENORMIN) 50 MG tablet Take 50 mg by mouth Daily.     • citalopram (CeleXA) 40 MG tablet Take 40 mg by mouth Daily.     • levothyroxine (SYNTHROID, LEVOTHROID) 125 MCG tablet Take 125 mcg by mouth Daily.     • losartan (COZAAR) 50 MG tablet Take 50 mg by mouth Daily.     • pantoprazole (PROTONIX) 40 MG EC tablet Take 40 mg by mouth Daily.     • simvastatin (ZOCOR) 40 MG  tablet Take 40 mg by mouth Every Night.     • tamsulosin (FLOMAX) 0.4 MG capsule 24 hr capsule Take 1 capsule by mouth Daily. 30 capsule 11     No Known Allergies  Social History     Social History   • Marital status:      Spouse name: N/A   • Number of children: N/A   • Years of education: N/A     Occupational History   • Not on file.     Social History Main Topics   • Smoking status: Former Smoker   • Smokeless tobacco: Never Used   • Alcohol use Yes      Comment: rarely    • Drug use: Defer   • Sexual activity: Not on file     Other Topics Concern   • Not on file     Social History Narrative     Family History   Problem Relation Age of Onset   • No Known Problems Father    • No Known Problems Mother    • Colon cancer Neg Hx    • Colon polyps Neg Hx      Health Maintenance   Topic Date Due   • TDAP/TD VACCINES (1 - Tdap) 09/23/1959   • PNEUMOCOCCAL VACCINES (65+ LOW/MEDIUM RISK) (1 of 2 - PCV13) 09/23/2005   • INFLUENZA VACCINE  08/01/2017   • MEDICARE ANNUAL WELLNESS  12/05/2017   • ZOSTER VACCINE  12/05/2017     Review of Systems   Constitutional: Negative for activity change, appetite change, chills, diaphoresis, fatigue, fever and unexpected weight change.   HENT: Negative for ear pain, hearing loss, mouth sores, sore throat, trouble swallowing and voice change.    Eyes: Negative.    Respiratory: Negative for cough, choking, shortness of breath and wheezing.    Cardiovascular: Negative for chest pain and palpitations.   Gastrointestinal: Positive for abdominal pain and diarrhea. Negative for blood in stool, constipation, nausea and vomiting.   Endocrine: Negative for cold intolerance and heat intolerance.   Genitourinary: Negative for decreased urine volume, dysuria, frequency, hematuria and urgency.   Musculoskeletal: Negative for back pain, gait problem and myalgias.   Skin: Negative for color change, pallor and rash.   Allergic/Immunologic: Negative for food allergies and immunocompromised state.  "  Neurological: Negative for dizziness, tremors, seizures, syncope, weakness, light-headedness, numbness and headaches.   Hematological: Negative for adenopathy. Does not bruise/bleed easily.   Psychiatric/Behavioral: Negative for agitation and confusion. The patient is not nervous/anxious.    All other systems reviewed and are negative.    Objective   Vitals:    01/10/18 1324   BP: 140/90   Pulse: 61   SpO2: 97%   Weight: 84.4 kg (186 lb)   Height: 185.4 cm (73\")     Body mass index is 24.54 kg/(m^2).  Physical Exam   Constitutional: He is oriented to person, place, and time. He appears well-developed and well-nourished.   HENT:   Head: Normocephalic and atraumatic.   Eyes: Pupils are equal, round, and reactive to light.   Neck: Normal range of motion. Neck supple. No tracheal deviation present.   Cardiovascular: Normal rate, regular rhythm and normal heart sounds.  Exam reveals no gallop and no friction rub.    No murmur heard.  Pulmonary/Chest: Effort normal and breath sounds normal. No respiratory distress. He has no wheezes. He has no rales. He exhibits no tenderness.   Abdominal: Soft. Bowel sounds are normal. He exhibits no distension. There is no hepatosplenomegaly. There is no tenderness. There is no rigidity, no rebound and no guarding.   Musculoskeletal: Normal range of motion. He exhibits no edema, tenderness or deformity.   Neurological: He is alert and oriented to person, place, and time. He has normal reflexes.   Skin: Skin is warm and dry. No rash noted. No pallor.   Psychiatric: He has a normal mood and affect. His behavior is normal. Judgment and thought content normal.     Assessment/Plan   Rajinder was seen today for gi problem.    Diagnoses and all orders for this visit:    Change in bowel habits  -     Case Request; Standing  -     Implement Anesthesia Orders Day of Procedure; Standing  -     Obtain Informed Consent; Standing  -     Verify bowel prep was successful; Standing  -     Case " Request  -     polyethylene glycol (GoLYTELY) 236 g solution; Take as directed by office instructions.    Lower abdominal pain    Tobacco abuse counseling    HTN (hypertension), benign  Comments:  cont BP medication the am of procedure    I have suggested that he cut all dairy products out of his diet for approx 2-3 weeks to see if this helps his symptoms.   No artificial sweeteners.     COLONOSCOPY WITH ANESTHESIA (N/A)  EMR Dragon/transcription disclaimer: Much of this encounter note is electronic transcription/translation of spoken language to printed text. The electronic translation of spoken language may be erroneous, or at times, nonsensical words or phrases may be inadvertently transcribed. Although I have reviewed the note for such errors, some may still exist.  Body mass index is 24.54 kg/(m^2).  No Follow-up on file.    Patient's BMI is within normal parameters. No follow-up required.      All risks, benefits, alternatives, and indications of colonoscopy and/or Endoscopy procedure have been discussed with the patient. Risks to include perforation of the colon requiring possible surgery or colostomy, risk of bleeding from biopsies or removal of colon tissue, possibility of missing a colon polyp or cancer, or adverse drug reaction.  Benefits to include the diagnosis and management of disease of the colon and rectum. Alternatives to include barium enema, radiographic evaluation, lab testing or no intervention. Pt verbalizes understanding and agrees.     Olamide Gonzalez, APRN  1/10/2018  1:54 PM

## 2018-02-26 ENCOUNTER — OUTSIDE FACILITY SERVICE (OUTPATIENT)
Dept: GASTROENTEROLOGY | Facility: CLINIC | Age: 78
End: 2018-02-26

## 2018-02-26 ENCOUNTER — HOSPITAL ENCOUNTER (OUTPATIENT)
Dept: HOSPITAL 58 - SURG | Age: 78
Discharge: HOME | End: 2018-02-26
Attending: INTERNAL MEDICINE

## 2018-02-26 VITALS — TEMPERATURE: 98.6 F | DIASTOLIC BLOOD PRESSURE: 68 MMHG | SYSTOLIC BLOOD PRESSURE: 142 MMHG

## 2018-02-26 DIAGNOSIS — R19.4: Primary | ICD-10-CM

## 2018-02-26 DIAGNOSIS — K57.30: ICD-10-CM

## 2018-02-26 PROCEDURE — 45378 DIAGNOSTIC COLONOSCOPY: CPT | Performed by: INTERNAL MEDICINE

## 2019-05-20 ENCOUNTER — OFFICE VISIT (OUTPATIENT)
Dept: GASTROENTEROLOGY | Facility: CLINIC | Age: 79
End: 2019-05-20

## 2019-05-20 VITALS
DIASTOLIC BLOOD PRESSURE: 70 MMHG | WEIGHT: 175 LBS | BODY MASS INDEX: 23.09 KG/M2 | OXYGEN SATURATION: 98 % | SYSTOLIC BLOOD PRESSURE: 128 MMHG | HEART RATE: 77 BPM

## 2019-05-20 DIAGNOSIS — R19.7 DIARRHEA, UNSPECIFIED TYPE: ICD-10-CM

## 2019-05-20 DIAGNOSIS — I10 HTN (HYPERTENSION), BENIGN: ICD-10-CM

## 2019-05-20 DIAGNOSIS — R10.13 ABDOMINAL PAIN, EPIGASTRIC: Primary | ICD-10-CM

## 2019-05-20 DIAGNOSIS — Z71.6 TOBACCO ABUSE COUNSELING: ICD-10-CM

## 2019-05-20 PROCEDURE — 99214 OFFICE O/P EST MOD 30 MIN: CPT | Performed by: CLINICAL NURSE SPECIALIST

## 2019-05-20 RX ORDER — HYDROCHLOROTHIAZIDE 25 MG/1
25 TABLET ORAL DAILY
COMMUNITY

## 2019-05-20 RX ORDER — COLESEVELAM 180 1/1
625 TABLET ORAL 2 TIMES DAILY WITH MEALS
Qty: 60 TABLET | Refills: 10 | Status: SHIPPED | OUTPATIENT
Start: 2019-05-20 | End: 2019-06-12 | Stop reason: SDUPTHER

## 2019-05-20 NOTE — PROGRESS NOTES
Rajinder Riggs  1940 5/20/2019  Chief Complaint   Patient presents with   • GI Problem     Diarrhea      Subjective   HPI  Rajinder Riggs is a 78 y.o. male who presents with a complaint of epigastric pain. He says it is a persistent hurt to the epigastric region ongoing persistent for months. He rates it a 5 out of 10.  It is tender when he pushes on it. It radiates up through his esophagus. He is on Protonix for GERD. He says that this manages this fair. It has been worse lately as well. No nausea or vomiting. He has associated bloating and gas.  He says that he has lost weight however in 1/2017 I have him at 177 In Jan 2018 he was 186 now he is back down to 175. He has a loss in appetite, he doesn't eat as much as he used to.   He has chronic ongoing diarrhea that he has had for years. He has had a colonoscopy 2/26/2018 and this was normal. He is s/p cholecystectomy. He does consume artificial sweeteners and I have suggested he discontinue these he uses them often throughout the day.     Past Medical History:   Diagnosis Date   • Coronary artery disease    • Depression    • Diabetes mellitus (CMS/HCC)    • Disease of thyroid gland    • Hypertension      Past Surgical History:   Procedure Laterality Date   • BACK SURGERY     • CAROTID ARTERY - SUBCLAVIAN ARTERY BYPASS GRAFT     • CHOLECYSTECTOMY     • COLONOSCOPY  09/28/2012    Normal exam   • COLONOSCOPY  02/26/2018    Normal exam repeat prn   • GALLBLADDER SURGERY         Outpatient Medications Marked as Taking for the 5/20/19 encounter (Office Visit) with Olamide Gonzalez APRN   Medication Sig Dispense Refill   • atenolol (TENORMIN) 50 MG tablet Take 50 mg by mouth Daily.     • citalopram (CeleXA) 40 MG tablet Take 40 mg by mouth Daily.     • hydrochlorothiazide (HYDRODIURIL) 25 MG tablet Take 25 mg by mouth Daily.     • levothyroxine (SYNTHROID, LEVOTHROID) 125 MCG tablet Take 125 mcg by mouth Daily.     • losartan (COZAAR) 50 MG tablet Take  50 mg by mouth Daily.     • pantoprazole (PROTONIX) 40 MG EC tablet Take 40 mg by mouth Daily.     • simvastatin (ZOCOR) 40 MG tablet Take 40 mg by mouth Every Night.     • tamsulosin (FLOMAX) 0.4 MG capsule 24 hr capsule Take 1 capsule by mouth Daily. 30 capsule 11     No Known Allergies  Social History     Socioeconomic History   • Marital status:      Spouse name: Not on file   • Number of children: Not on file   • Years of education: Not on file   • Highest education level: Not on file   Tobacco Use   • Smoking status: Current Every Day Smoker     Packs/day: 1.00     Types: Cigarettes   • Smokeless tobacco: Never Used   Substance and Sexual Activity   • Alcohol use: No     Frequency: Never   • Drug use: Defer     Family History   Problem Relation Age of Onset   • No Known Problems Father    • No Known Problems Mother    • Colon cancer Neg Hx    • Colon polyps Neg Hx      Health Maintenance   Topic Date Due   • ANNUAL PHYSICAL  09/23/1943   • TDAP/TD VACCINES (1 - Tdap) 09/23/1959   • ZOSTER VACCINE (1 of 2) 09/23/1990   • PNEUMOCOCCAL VACCINES (65+ LOW/MEDIUM RISK) (1 of 2 - PCV13) 09/23/2005   • INFLUENZA VACCINE  08/01/2019     Review of Systems   Constitutional: Negative for activity change, appetite change, chills, diaphoresis, fatigue, fever and unexpected weight change.   HENT: Negative for ear pain, hearing loss, mouth sores, sore throat, trouble swallowing and voice change.    Eyes: Negative.    Respiratory: Negative for cough, choking, shortness of breath and wheezing.    Cardiovascular: Negative for chest pain and palpitations.   Gastrointestinal: Positive for abdominal pain and nausea. Negative for blood in stool, constipation, diarrhea and vomiting.   Endocrine: Negative for cold intolerance and heat intolerance.   Genitourinary: Negative for decreased urine volume, dysuria, frequency, hematuria and urgency.   Musculoskeletal: Negative for back pain, gait problem and myalgias.   Skin: Negative  for color change, pallor and rash.   Allergic/Immunologic: Negative for food allergies and immunocompromised state.   Neurological: Negative for dizziness, tremors, seizures, syncope, weakness, light-headedness, numbness and headaches.   Hematological: Negative for adenopathy. Does not bruise/bleed easily.   Psychiatric/Behavioral: Negative for agitation and confusion. The patient is not nervous/anxious.    All other systems reviewed and are negative.    Objective   Vitals:    05/20/19 1347   BP: 128/70   Pulse: 77   SpO2: 98%   Weight: 79.4 kg (175 lb)     Body mass index is 23.09 kg/m².  Physical Exam   Constitutional: He is oriented to person, place, and time. He appears well-developed and well-nourished.   HENT:   Head: Normocephalic and atraumatic.   Eyes: Pupils are equal, round, and reactive to light.   Neck: Normal range of motion. Neck supple. No tracheal deviation present.   Cardiovascular: Normal rate, regular rhythm and normal heart sounds. Exam reveals no gallop and no friction rub.   No murmur heard.  Pulmonary/Chest: Effort normal and breath sounds normal. No respiratory distress. He has no wheezes. He has no rales. He exhibits no tenderness.   Abdominal: Soft. Bowel sounds are normal. He exhibits no distension. There is no hepatosplenomegaly. There is tenderness (epigastric). There is no rigidity, no rebound and no guarding.   Musculoskeletal: Normal range of motion. He exhibits no edema, tenderness or deformity.   Neurological: He is alert and oriented to person, place, and time. He has normal reflexes.   Skin: Skin is warm and dry. No rash noted. No pallor.   Psychiatric: He has a normal mood and affect. His behavior is normal. Judgment and thought content normal.     Assessment/Plan   Rajinder was seen today for gi problem.    Diagnoses and all orders for this visit:    Abdominal pain, epigastric  -     Case Request; Standing  -     Follow Anesthesia Guidelines / Standing Orders; Future  -      Obtain Informed Consent; Future  -     Implement Anesthesia Orders Day of Procedure; Standing  -     Obtain Informed Consent; Standing  -     Case Request    Diarrhea, unspecified type  Comments:  for years suspect dietary and/or bile salt induced  Orders:  -     colesevelam (WELCHOL) 625 MG tablet; Take 1 tablet by mouth 2 (Two) Times a Day With Meals.    HTN (hypertension), benign  Comments:  cont BP medication the day of procedure    Tobacco abuse counseling    No NSAIDS  No artificial sweeteners  I discussed non pharmaceutical treatment of gerd.  This includes gradually losing weight to achieve ideal body wt., elevation of the head of bed by 4-6 inches, nothing to eat or drink 3 hours prior to lying down, avoiding tight clothing, stress reduction, tobacco cessation, reduction of alcohol intake, and dietary restrictions (avoiding caffeine, coffee, fatty foods, mints, chocolate, spicy foods and tomato based sauces as much as possible).      ESOPHAGOGASTRODUODENOSCOPY WITH ANESTHESIA (N/A)  EMR Dragon/transcription disclaimer: Much of this encounter note is electronic transcription/translation of spoken language to printed text. The electronic translation of spoken language may be erroneous, or at times, nonsensical words or phrases may be inadvertently transcribed. Although I have reviewed the note for such errors, some may still exist.  Body mass index is 23.09 kg/m².  Return in about 3 months (around 8/20/2019).    Patient's Body mass index is 23.09 kg/m². BMI is within normal parameters. No follow-up required..      All risks, benefits, alternatives, and indications of colonoscopy and/or Endoscopy procedure have been discussed with the patient. Risks to include perforation of the colon requiring possible surgery or colostomy, risk of bleeding from biopsies or removal of colon tissue, possibility of missing a colon polyp or cancer, or adverse drug reaction.  Benefits to include the diagnosis and management of  disease of the colon and rectum. Alternatives to include barium enema, radiographic evaluation, lab testing or no intervention. Pt verbalizes understanding and agrees.     Olamide Gonzalez, APRN  5/20/2019  2:15 PM      Obesity, Adult  Obesity is the condition of having too much total body fat. Being overweight or obese means that your weight is greater than what is considered healthy for your body size. Obesity is determined by a measurement called BMI. BMI is an estimate of body fat and is calculated from height and weight. For adults, a BMI of 30 or higher is considered obese.  Obesity can eventually lead to other health concerns and major illnesses, including:  · Stroke.  · Coronary artery disease (CAD).  · Type 2 diabetes.  · Some types of cancer, including cancers of the colon, breast, uterus, and gallbladder.  · Osteoarthritis.  · High blood pressure (hypertension).  · High cholesterol.  · Sleep apnea.  · Gallbladder stones.  · Infertility problems.  What are the causes?  The main cause of obesity is taking in (consuming) more calories than your body uses for energy. Other factors that contribute to this condition may include:  · Being born with genes that make you more likely to become obese.  · Having a medical condition that causes obesity. These conditions include:  ¨ Hypothyroidism.  ¨ Polycystic ovarian syndrome (PCOS).  ¨ Binge-eating disorder.  ¨ Cushing syndrome.  · Taking certain medicines, such as steroids, antidepressants, and seizure medicines.  · Not being physically active (sedentary lifestyle).  · Living where there are limited places to exercise safely or buy healthy foods.  · Not getting enough sleep.  What increases the risk?  The following factors may increase your risk of this condition:  · Having a family history of obesity.  · Being a woman of -American descent.  · Being a man of  descent.  What are the signs or symptoms?  Having excessive body fat is the main symptom  of this condition.  How is this diagnosed?  This condition may be diagnosed based on:  · Your symptoms.  · Your medical history.  · A physical exam. Your health care provider may measure:  ¨ Your BMI. If you are an adult with a BMI between 25 and less than 30, you are considered overweight. If you are an adult with a BMI of 30 or higher, you are considered obese.  ¨ The distances around your hips and your waist (circumferences). These may be compared to each other to help diagnose your condition.  ¨ Your skinfold thickness. Your health care provider may gently pinch a fold of your skin and measure it.  How is this treated?  Treatment for this condition often includes changing your lifestyle. Treatment may include some or all of the following:  · Dietary changes. Work with your health care provider and a dietitian to set a weight-loss goal that is healthy and reasonable for you. Dietary changes may include eating:  ¨ Smaller portions. A portion size is the amount of a particular food that is healthy for you to eat at one time. This varies from person to person.  ¨ Low-calorie or low-fat options.  ¨ More whole grains, fruits, and vegetables.  · Regular physical activity. This may include aerobic activity (cardio) and strength training.  · Medicine to help you lose weight. Your health care provider may prescribe medicine if you are unable to lose 1 pound a week after 6 weeks of eating more healthily and doing more physical activity.  · Surgery. Surgical options may include gastric banding and gastric bypass. Surgery may be done if:  ¨ Other treatments have not helped to improve your condition.  ¨ You have a BMI of 40 or higher.  ¨ You have life-threatening health problems related to obesity.  Follow these instructions at home:     Eating and drinking     · Follow recommendations from your health care provider about what you eat and drink. Your health care provider may advise you to:  ¨ Limit fast foods, sweets, and  processed snack foods.  ¨ Choose low-fat options, such as low-fat milk instead of whole milk.  ¨ Eat 5 or more servings of fruits or vegetables every day.  ¨ Eat at home more often. This gives you more control over what you eat.  ¨ Choose healthy foods when you eat out.  ¨ Learn what a healthy portion size is.  ¨ Keep low-fat snacks on hand.  ¨ Avoid sugary drinks, such as soda, fruit juice, iced tea sweetened with sugar, and flavored milk.  ¨ Eat a healthy breakfast.  · Drink enough water to keep your urine clear or pale yellow.  · Do not go without eating for long periods of time (do not fast) or follow a fad diet. Fasting and fad diets can be unhealthy and even dangerous.  Physical Activity   · Exercise regularly, as told by your health care provider. Ask your health care provider what types of exercise are safe for you and how often you should exercise.  · Warm up and stretch before being active.  · Cool down and stretch after being active.  · Rest between periods of activity.  Lifestyle   · Limit the time that you spend in front of your TV, computer, or video game system.  · Find ways to reward yourself that do not involve food.  · Limit alcohol intake to no more than 1 drink a day for nonpregnant women and 2 drinks a day for men. One drink equals 12 oz of beer, 5 oz of wine, or 1½ oz of hard liquor.  General instructions   · Keep a weight loss journal to keep track of the food you eat and how much you exercise you get.  · Take over-the-counter and prescription medicines only as told by your health care provider.  · Take vitamins and supplements only as told by your health care provider.  · Consider joining a support group. Your health care provider may be able to recommend a support group.  · Keep all follow-up visits as told by your health care provider. This is important.  Contact a health care provider if:  · You are unable to meet your weight loss goal after 6 weeks of dietary and lifestyle changes.  This  information is not intended to replace advice given to you by your health care provider. Make sure you discuss any questions you have with your health care provider.  Document Released: 01/25/2006 Document Revised: 05/22/2017 Document Reviewed: 10/05/2016  [employer] Patient Education © 2017 [employer]      If you smoke or use tobacco, 4 minutes reading provided  Steps to Quit Smoking  Smoking tobacco can be harmful to your health and can affect almost every organ in your body. Smoking puts you, and those around you, at risk for developing many serious chronic diseases. Quitting smoking is difficult, but it is one of the best things that you can do for your health. It is never too late to quit.  What are the benefits of quitting smoking?  When you quit smoking, you lower your risk of developing serious diseases and conditions, such as:  · Lung cancer or lung disease, such as COPD.  · Heart disease.  · Stroke.  · Heart attack.  · Infertility.  · Osteoporosis and bone fractures.  Additionally, symptoms such as coughing, wheezing, and shortness of breath may get better when you quit. You may also find that you get sick less often because your body is stronger at fighting off colds and infections. If you are pregnant, quitting smoking can help to reduce your chances of having a baby of low birth weight.  How do I get ready to quit?  When you decide to quit smoking, create a plan to make sure that you are successful. Before you quit:  · Pick a date to quit. Set a date within the next two weeks to give you time to prepare.  · Write down the reasons why you are quitting. Keep this list in places where you will see it often, such as on your bathroom mirror or in your car or wallet.  · Identify the people, places, things, and activities that make you want to smoke (triggers) and avoid them. Make sure to take these actions:  ¨ Throw away all cigarettes at home, at work, and in your car.  ¨ Throw away smoking  accessories, such as ashtrays and lighters.  ¨ Clean your car and make sure to empty the ashtray.  ¨ Clean your home, including curtains and carpets.  · Tell your family, friends, and coworkers that you are quitting. Support from your loved ones can make quitting easier.  · Talk with your health care provider about your options for quitting smoking.  · Find out what treatment options are covered by your health insurance.  What strategies can I use to quit smoking?  Talk with your healthcare provider about different strategies to quit smoking. Some strategies include:  · Quitting smoking altogether instead of gradually lessening how much you smoke over a period of time. Research shows that quitting “cold turkey” is more successful than gradually quitting.  · Attending in-person counseling to help you build problem-solving skills. You are more likely to have success in quitting if you attend several counseling sessions. Even short sessions of 10 minutes can be effective.  · Finding resources and support systems that can help you to quit smoking and remain smoke-free after you quit. These resources are most helpful when you use them often. They can include:  ¨ Online chats with a counselor.  ¨ Telephone quitlines.  ¨ Printed self-help materials.  ¨ Support groups or group counseling.  ¨ Text messaging programs.  ¨ Mobile phone applications.  · Taking medicines to help you quit smoking. (If you are pregnant or breastfeeding, talk with your health care provider first.) Some medicines contain nicotine and some do not. Both types of medicines help with cravings, but the medicines that include nicotine help to relieve withdrawal symptoms. Your health care provider may recommend:  ¨ Nicotine patches, gum, or lozenges.  ¨ Nicotine inhalers or sprays.  ¨ Non-nicotine medicine that is taken by mouth.  Talk with your health care provider about combining strategies, such as taking medicines while you are also receiving in-person  counseling. Using these two strategies together makes you more likely to succeed in quitting than if you used either strategy on its own.  If you are pregnant or breastfeeding, talk with your health care provider about finding counseling or other support strategies to quit smoking. Do not take medicine to help you quit smoking unless told to do so by your health care provider.  What things can I do to make it easier to quit?  Quitting smoking might feel overwhelming at first, but there is a lot that you can do to make it easier. Take these important actions:  · Reach out to your family and friends and ask that they support and encourage you during this time. Call telephone quitlines, reach out to support groups, or work with a counselor for support.  · Ask people who smoke to avoid smoking around you.  · Avoid places that trigger you to smoke, such as bars, parties, or smoke-break areas at work.  · Spend time around people who do not smoke.  · Lessen stress in your life, because stress can be a smoking trigger for some people. To lessen stress, try:  ¨ Exercising regularly.  ¨ Deep-breathing exercises.  ¨ Yoga.  ¨ Meditating.  ¨ Performing a body scan. This involves closing your eyes, scanning your body from head to toe, and noticing which parts of your body are particularly tense. Purposefully relax the muscles in those areas.  · Download or purchase mobile phone or tablet apps (applications) that can help you stick to your quit plan by providing reminders, tips, and encouragement. There are many free apps, such as QuitGuide from the CDC (Centers for Disease Control and Prevention). You can find other support for quitting smoking (smoking cessation) through smokefree.gov and other websites.  How will I feel when I quit smoking?  Within the first 24 hours of quitting smoking, you may start to feel some withdrawal symptoms. These symptoms are usually most noticeable 2-3 days after quitting, but they usually do not  last beyond 2-3 weeks. Changes or symptoms that you might experience include:  · Mood swings.  · Restlessness, anxiety, or irritation.  · Difficulty concentrating.  · Dizziness.  · Strong cravings for sugary foods in addition to nicotine.  · Mild weight gain.  · Constipation.  · Nausea.  · Coughing or a sore throat.  · Changes in how your medicines work in your body.  · A depressed mood.  · Difficulty sleeping (insomnia).  After the first 2-3 weeks of quitting, you may start to notice more positive results, such as:  · Improved sense of smell and taste.  · Decreased coughing and sore throat.  · Slower heart rate.  · Lower blood pressure.  · Clearer skin.  · The ability to breathe more easily.  · Fewer sick days.  Quitting smoking is very challenging for most people. Do not get discouraged if you are not successful the first time. Some people need to make many attempts to quit before they achieve long-term success. Do your best to stick to your quit plan, and talk with your health care provider if you have any questions or concerns.  This information is not intended to replace advice given to you by your health care provider. Make sure you discuss any questions you have with your health care provider.  Document Released: 12/12/2002 Document Revised: 08/15/2017 Document Reviewed: 05/03/2016  [employer] Interactive Patient Education © 2017 [employer]

## 2019-05-28 ENCOUNTER — ANESTHESIA EVENT (OUTPATIENT)
Dept: GASTROENTEROLOGY | Facility: HOSPITAL | Age: 79
End: 2019-05-28

## 2019-05-28 ENCOUNTER — ANESTHESIA (OUTPATIENT)
Dept: GASTROENTEROLOGY | Facility: HOSPITAL | Age: 79
End: 2019-05-28

## 2019-05-28 ENCOUNTER — HOSPITAL ENCOUNTER (OUTPATIENT)
Facility: HOSPITAL | Age: 79
Setting detail: HOSPITAL OUTPATIENT SURGERY
Discharge: HOME | End: 2019-05-28
Attending: INTERNAL MEDICINE | Admitting: INTERNAL MEDICINE

## 2019-05-28 VITALS
DIASTOLIC BLOOD PRESSURE: 42 MMHG | TEMPERATURE: 97.8 F | HEART RATE: 55 BPM | WEIGHT: 176 LBS | RESPIRATION RATE: 18 BRPM | HEIGHT: 71 IN | BODY MASS INDEX: 24.64 KG/M2 | OXYGEN SATURATION: 95 % | SYSTOLIC BLOOD PRESSURE: 103 MMHG

## 2019-05-28 DIAGNOSIS — R10.13 ABDOMINAL PAIN, EPIGASTRIC: ICD-10-CM

## 2019-05-28 LAB — GLUCOSE BLDC GLUCOMTR-MCNC: 106 MG/DL (ref 70–130)

## 2019-05-28 PROCEDURE — 82962 GLUCOSE BLOOD TEST: CPT

## 2019-05-28 PROCEDURE — 43235 EGD DIAGNOSTIC BRUSH WASH: CPT | Performed by: INTERNAL MEDICINE

## 2019-05-28 PROCEDURE — 25010000002 PROPOFOL 10 MG/ML EMULSION: Performed by: NURSE ANESTHETIST, CERTIFIED REGISTERED

## 2019-05-28 RX ORDER — SODIUM CHLORIDE 0.9 % (FLUSH) 0.9 %
3 SYRINGE (ML) INJECTION AS NEEDED
Status: DISCONTINUED | OUTPATIENT
Start: 2019-05-28 | End: 2019-05-28 | Stop reason: HOSPADM

## 2019-05-28 RX ORDER — ASPIRIN 81 MG/1
81 TABLET ORAL DAILY
COMMUNITY

## 2019-05-28 RX ORDER — SODIUM CHLORIDE 9 MG/ML
500 INJECTION, SOLUTION INTRAVENOUS CONTINUOUS PRN
Status: DISCONTINUED | OUTPATIENT
Start: 2019-05-28 | End: 2019-05-28 | Stop reason: HOSPADM

## 2019-05-28 RX ORDER — IBUPROFEN 200 MG
200 TABLET ORAL EVERY 8 HOURS PRN
COMMUNITY

## 2019-05-28 RX ORDER — PROPOFOL 10 MG/ML
VIAL (ML) INTRAVENOUS AS NEEDED
Status: DISCONTINUED | OUTPATIENT
Start: 2019-05-28 | End: 2019-05-28 | Stop reason: SURG

## 2019-05-28 RX ADMIN — PROPOFOL 50 MG: 10 INJECTION, EMULSION INTRAVENOUS at 12:06

## 2019-05-28 RX ADMIN — SODIUM CHLORIDE: 9 INJECTION, SOLUTION INTRAVENOUS at 12:02

## 2019-05-28 RX ADMIN — SODIUM CHLORIDE 500 ML: 9 INJECTION, SOLUTION INTRAVENOUS at 11:43

## 2019-05-28 NOTE — ANESTHESIA POSTPROCEDURE EVALUATION
"Patient: Rajinder Riggs    Procedure Summary     Date:  05/28/19 Room / Location:  [facility] ENDOSCOPY 2 / [facility] ENDOS[facility]    Anesthesia Start:  1202 Anesthesia Stop:  1216    Procedure:  ESOPHAGOGASTRODUODENOSCOPY WITH ANESTHESIA (N/A ) Diagnosis:       Abdominal pain, epigastric      (Abdominal pain, epigastric [R10.13])    Surgeon:  Asael Lebron MD Provider:  En Rocha CRNA    Anesthesia Type:  MAC ASA Status:  2          Anesthesia Type: MAC  Last vitals  BP   113/44 (05/28/19 1216)   Temp   97.8 °F (36.6 °C) (05/28/19 1124)   Pulse   57 (05/28/19 1216)   Resp   22 (05/28/19 1216)     SpO2   93 % (05/28/19 1216)     Post Anesthesia Care and Evaluation    Patient location during evaluation: PACU  Patient participation: complete - patient participated  Level of consciousness: awake and alert  Pain management: adequate  Airway patency: patent  Anesthetic complications: No anesthetic complications    Cardiovascular status: acceptable  Respiratory status: acceptable  Hydration status: acceptable    Comments: Blood pressure 113/44, pulse 57, temperature 97.8 °F (36.6 °C), temperature source Temporal, resp. rate 22, height 179.7 cm (70.75\"), weight 79.8 kg (176 lb), SpO2 93 %.    Pt discharged from PACU based on martha score >8      "

## 2019-05-28 NOTE — ANESTHESIA PREPROCEDURE EVALUATION
Anesthesia Evaluation     Patient summary reviewed   no history of anesthetic complications:  NPO Solid Status: > 8 hours             Airway   Mallampati: II  TM distance: >3 FB  Neck ROM: full  Dental    (+) upper dentures and lower dentures    Pulmonary    (+) a smoker,   Cardiovascular   Exercise tolerance: excellent (>7 METS)    (+) hypertension, hyperlipidemia,       Neuro/Psych- negative ROS  GI/Hepatic/Renal/Endo    (+)  GERD,  diabetes mellitus, hypothyroidism,     Musculoskeletal     Abdominal    Substance History      OB/GYN          Other                        Anesthesia Plan    ASA 2     MAC     Anesthetic plan, all risks, benefits, and alternatives have been provided, discussed and informed consent has been obtained with: patient.

## 2019-05-28 NOTE — ANESTHESIA POSTPROCEDURE EVALUATION
"Patient: Rajinder Riggs    Procedure Summary     Date:  05/28/19 Room / Location:  [facility] ENDOSCOPY 2 / [facility] ENDOSCOPY    Anesthesia Start:  1202 Anesthesia Stop:  1216    Procedure:  ESOPHAGOGASTRODUODENOSCOPY WITH ANESTHESIA (N/A ) Diagnosis:       Abdominal pain, epigastric      (Abdominal pain, epigastric [R10.13])    Surgeon:  Asael Lebron MD Provider:  En Rocha CRNA    Anesthesia Type:  MAC ASA Status:  2          Anesthesia Type: MAC  Last vitals  BP   113/44 (05/28/19 1216)   Temp   97.8 °F (36.6 °C) (05/28/19 1124)   Pulse   57 (05/28/19 1216)   Resp   22 (05/28/19 1216)     SpO2   93 % (05/28/19 1216)     Post Anesthesia Care and Evaluation    Patient location during evaluation: PACU  Patient participation: complete - patient participated  Level of consciousness: awake and alert  Pain management: adequate  Airway patency: patent  Anesthetic complications: No anesthetic complications    Cardiovascular status: acceptable  Respiratory status: acceptable  Hydration status: acceptable    Comments: Blood pressure 113/44, pulse 57, temperature 97.8 °F (36.6 °C), temperature source Temporal, resp. rate 22, height 179.7 cm (70.75\"), weight 79.8 kg (176 lb), SpO2 93 %.    Pt discharged from PACU based on martha score >8      "

## 2019-06-04 ENCOUNTER — HOSPITAL ENCOUNTER (OUTPATIENT)
Dept: GENERAL RADIOLOGY | Facility: HOSPITAL | Age: 79
Discharge: HOME | End: 2019-06-04
Admitting: INTERNAL MEDICINE

## 2019-06-04 DIAGNOSIS — R10.13 ABDOMINAL PAIN, EPIGASTRIC: ICD-10-CM

## 2019-06-04 PROCEDURE — A9270 NON-COVERED ITEM OR SERVICE: HCPCS | Performed by: INTERNAL MEDICINE

## 2019-06-04 PROCEDURE — 63710000001 BARIUM SULFATE 98 % RECONSTITUTED SUSPENSION: Performed by: INTERNAL MEDICINE

## 2019-06-04 PROCEDURE — 63710000001 BARIUM SULFATE 700 MG TABLET: Performed by: INTERNAL MEDICINE

## 2019-06-04 PROCEDURE — 63710000001 BARIUM SULFATE 96 % RECONSTITUTED SUSPENSION: Performed by: INTERNAL MEDICINE

## 2019-06-04 PROCEDURE — 74246 X-RAY XM UPR GI TRC 2CNTRST: CPT

## 2019-06-04 RX ADMIN — BARIUM SULFATE 120 ML: 980 POWDER, FOR SUSPENSION ORAL at 11:50

## 2019-06-04 RX ADMIN — BARIUM SULFATE 700 MG: 700 TABLET ORAL at 11:50

## 2019-06-04 RX ADMIN — BARIUM SULFATE 240 ML: 960 POWDER, FOR SUSPENSION ORAL at 11:50

## 2019-06-12 DIAGNOSIS — R19.7 DIARRHEA, UNSPECIFIED TYPE: ICD-10-CM

## 2019-06-12 RX ORDER — COLESEVELAM 180 1/1
625 TABLET ORAL 2 TIMES DAILY WITH MEALS
Qty: 180 TABLET | Refills: 4 | Status: SHIPPED | OUTPATIENT
Start: 2019-06-12

## 2019-06-13 ENCOUNTER — TELEPHONE (OUTPATIENT)
Dept: GASTROENTEROLOGY | Facility: CLINIC | Age: 79
End: 2019-06-13

## 2019-06-13 NOTE — TELEPHONE ENCOUNTER
Patient notified [facility] will not [facility] Welchol they denied his prior authorization they said he needs to try probiotic and pepto bismol first.

## 2019-08-20 ENCOUNTER — OFFICE VISIT (OUTPATIENT)
Dept: GASTROENTEROLOGY | Facility: CLINIC | Age: 79
End: 2019-08-20

## 2019-08-20 VITALS
DIASTOLIC BLOOD PRESSURE: 62 MMHG | SYSTOLIC BLOOD PRESSURE: 128 MMHG | WEIGHT: 169 LBS | OXYGEN SATURATION: 97 % | BODY MASS INDEX: 22.4 KG/M2 | HEIGHT: 73 IN | HEART RATE: 66 BPM

## 2019-08-20 DIAGNOSIS — R19.7 DIARRHEA, UNSPECIFIED TYPE: Primary | ICD-10-CM

## 2019-08-20 DIAGNOSIS — Z71.6 TOBACCO ABUSE COUNSELING: ICD-10-CM

## 2019-08-20 PROCEDURE — 99213 OFFICE O/P EST LOW 20 MIN: CPT | Performed by: CLINICAL NURSE SPECIALIST

## 2019-08-20 NOTE — PROGRESS NOTES
Rajinder Riggs  1940 8/20/2019  Chief Complaint   Patient presents with   • GI Problem     Diarrhea         HPI    Rajinder Riggs is a  78 y.o. male here for a follow up visit for diarrhea course constant ongoing. He has had this for years. I felt likely bile salt induced and or dietary related. I put him on Welchol at our last appointment 5/20/2019.  [facility] would not [facility] Welchol. He was trialed on Colestid and this made him have some side effects with lightheadedness. His diarrhea continues going up to twice per day but it is not everyday. He has not been on any antibiotics. He has not performed stool cultures.    Past Medical History:   Diagnosis Date   • Coronary artery disease    • Depression    • Diabetes mellitus (CMS/HCC)    • Disease of thyroid gland    • Elevated cholesterol    • Hypertension      Past Surgical History:   Procedure Laterality Date   • BACK SURGERY     • CAROTID ARTERY - SUBCLAVIAN ARTERY BYPASS GRAFT     • CHOLECYSTECTOMY     • COLONOSCOPY  09/28/2012    Normal exam   • COLONOSCOPY  02/26/2018    Normal exam repeat prn   • ENDOSCOPY N/A 5/28/2019    A large amount of food residue in the stomach otherwise normal exam.   • GALLBLADDER SURGERY         Outpatient Medications Marked as Taking for the 8/20/19 encounter (Office Visit) with Olamide Gonzalez APRN   Medication Sig Dispense Refill   • aspirin 81 MG EC tablet Take 81 mg by mouth Daily.     • atenolol (TENORMIN) 50 MG tablet Take 50 mg by mouth Daily.     • bismuth subsalicylate (PEPTO BISMOL) 262 MG/15ML suspension Take 30 mL by mouth Daily.     • citalopram (CeleXA) 40 MG tablet Take 40 mg by mouth Daily.     • hydrochlorothiazide (HYDRODIURIL) 25 MG tablet Take 25 mg by mouth Daily.     • ibuprofen (ADVIL,MOTRIN) 200 MG tablet Take 200 mg by mouth Every 8 (Eight) Hours As Needed for Mild Pain .     • Lactobacillus (PROBIOTIC ACIDOPHILUS PO) Take 2 capsules by mouth 2 (Two) Times a Day.     • levothyroxine  (SYNTHROID, LEVOTHROID) 125 MCG tablet Take 125 mcg by mouth Daily.     • losartan (COZAAR) 50 MG tablet Take 50 mg by mouth Daily.     • METFORMIN HCL PO Take 1 tablet by mouth Daily.     • pantoprazole (PROTONIX) 40 MG EC tablet Take 40 mg by mouth Daily.     • simvastatin (ZOCOR) 40 MG tablet Take 40 mg by mouth Every Night.     • tamsulosin (FLOMAX) 0.4 MG capsule 24 hr capsule Take 1 capsule by mouth Daily. 30 capsule 11       No Known Allergies    Social History     Socioeconomic History   • Marital status:      Spouse name: Not on file   • Number of children: Not on file   • Years of education: Not on file   • Highest education level: Not on file   Tobacco Use   • Smoking status: Current Every Day Smoker     Packs/day: 1.00     Types: Cigarettes   • Smokeless tobacco: Never Used   Substance and Sexual Activity   • Alcohol use: No     Frequency: Never   • Drug use: Defer   • Sexual activity: Defer       Family History   Problem Relation Age of Onset   • No Known Problems Father    • No Known Problems Mother    • Diabetes Brother    • Colon cancer Neg Hx    • Colon polyps Neg Hx        Review of Systems   Constitutional: Negative for activity change, appetite change, chills, diaphoresis, fatigue, fever and unexpected weight change.   HENT: Negative for ear pain, hearing loss, mouth sores, sore throat, trouble swallowing and voice change.    Eyes: Negative.    Respiratory: Negative for cough, choking, shortness of breath and wheezing.    Cardiovascular: Negative for chest pain and palpitations.   Gastrointestinal: Positive for diarrhea. Negative for abdominal pain, blood in stool, constipation, nausea and vomiting.   Endocrine: Negative for cold intolerance and heat intolerance.   Genitourinary: Negative for decreased urine volume, dysuria, frequency, hematuria and urgency.   Musculoskeletal: Negative for back pain, gait problem and myalgias.   Skin: Negative for color change, pallor and rash.  "  Allergic/Immunologic: Negative for food allergies and immunocompromised state.   Neurological: Negative for dizziness, tremors, seizures, syncope, weakness, light-headedness, numbness and headaches.   Hematological: Negative for adenopathy. Does not bruise/bleed easily.   Psychiatric/Behavioral: Negative for agitation and confusion. The patient is not nervous/anxious.    All other systems reviewed and are negative.      /62   Pulse 66   Ht 185.4 cm (73\")   Wt 76.7 kg (169 lb)   SpO2 97%   BMI 22.30 kg/m²   Body mass index is 22.3 kg/m².    Physical Exam   Constitutional: He is oriented to person, place, and time. He appears well-developed and well-nourished.   HENT:   Head: Normocephalic and atraumatic.   Eyes: Pupils are equal, round, and reactive to light.   Neck: Normal range of motion. Neck supple. No tracheal deviation present.   Cardiovascular: Normal rate, regular rhythm and normal heart sounds. Exam reveals no gallop and no friction rub.   No murmur heard.  Pulmonary/Chest: Effort normal and breath sounds normal. No respiratory distress. He has no wheezes. He has no rales. He exhibits no tenderness.   Abdominal: Soft. Bowel sounds are normal. He exhibits no distension. There is no hepatosplenomegaly. There is no tenderness. There is no rigidity, no rebound and no guarding.   Musculoskeletal: Normal range of motion. He exhibits no edema, tenderness or deformity.   Neurological: He is alert and oriented to person, place, and time. He has normal reflexes.   Skin: Skin is warm and dry. No rash noted. No pallor.   Psychiatric: He has a normal mood and affect. His behavior is normal. Judgment and thought content normal.       ASSESSMENT AND PLAN    Patient's Body mass index is 22.3 kg/m². BMI is within normal parameters. No follow-up required..    Rajinder was seen today for gi problem.    Diagnoses and all orders for this visit:    Diarrhea, unspecified type  -     Gastrointestinal Panel, PCR - Stool, " Per Rectum  -     Fecal Leukocytes - Stool, Per Rectum    Tobacco abuse counseling      I feel that this is due to his artificial sweeteners. He cant trial the Welchol it was too expensive. He couldn't take the Colestid. I feel likely bile salt induced will rule out infectious.     There are no Patient Instructions on file for this visit.  Olamide Cindi Gonzalez, APRN  1:39 PM  8/20/2019    Obesity, Adult  Obesity is the condition of having too much total body fat. Being overweight or obese means that your weight is greater than what is considered healthy for your body size. Obesity is determined by a measurement called BMI. BMI is an estimate of body fat and is calculated from height and weight. For adults, a BMI of 30 or higher is considered obese.  Obesity can eventually lead to other health concerns and major illnesses, including:  · Stroke.  · Coronary artery disease (CAD).  · Type 2 diabetes.  · Some types of cancer, including cancers of the colon, breast, uterus, and gallbladder.  · Osteoarthritis.  · High blood pressure (hypertension).  · High cholesterol.  · Sleep apnea.  · Gallbladder stones.  · Infertility problems.  What are the causes?  The main cause of obesity is taking in (consuming) more calories than your body uses for energy. Other factors that contribute to this condition may include:  · Being born with genes that make you more likely to become obese.  · Having a medical condition that causes obesity. These conditions include:  ¨ Hypothyroidism.  ¨ Polycystic ovarian syndrome (PCOS).  ¨ Binge-eating disorder.  ¨ Cushing syndrome.  · Taking certain medicines, such as steroids, antidepressants, and seizure medicines.  · Not being physically active (sedentary lifestyle).  · Living where there are limited places to exercise safely or buy healthy foods.  · Not getting enough sleep.  What increases the risk?  The following factors may increase your risk of this condition:  · Having a family history of  obesity.  · Being a woman of -American descent.  · Being a man of  descent.  What are the signs or symptoms?  Having excessive body fat is the main symptom of this condition.  How is this diagnosed?  This condition may be diagnosed based on:  · Your symptoms.  · Your medical history.  · A physical exam. Your health care provider may measure:  ¨ Your BMI. If you are an adult with a BMI between 25 and less than 30, you are considered overweight. If you are an adult with a BMI of 30 or higher, you are considered obese.  ¨ The distances around your hips and your waist (circumferences). These may be compared to each other to help diagnose your condition.  ¨ Your skinfold thickness. Your health care provider may gently pinch a fold of your skin and measure it.  How is this treated?  Treatment for this condition often includes changing your lifestyle. Treatment may include some or all of the following:  · Dietary changes. Work with your health care provider and a dietitian to set a weight-loss goal that is healthy and reasonable for you. Dietary changes may include eating:  ¨ Smaller portions. A portion size is the amount of a particular food that is healthy for you to eat at one time. This varies from person to person.  ¨ Low-calorie or low-fat options.  ¨ More whole grains, fruits, and vegetables.  · Regular physical activity. This may include aerobic activity (cardio) and strength training.  · Medicine to help you lose weight. Your health care provider may prescribe medicine if you are unable to lose 1 pound a week after 6 weeks of eating more healthily and doing more physical activity.  · Surgery. Surgical options may include gastric banding and gastric bypass. Surgery may be done if:  ¨ Other treatments have not helped to improve your condition.  ¨ You have a BMI of 40 or higher.  ¨ You have life-threatening health problems related to obesity.  Follow these instructions at home:     Eating and drinking      · Follow recommendations from your health care provider about what you eat and drink. Your health care provider may advise you to:  ¨ Limit fast foods, sweets, and processed snack foods.  ¨ Choose low-fat options, such as low-fat milk instead of whole milk.  ¨ Eat 5 or more servings of fruits or vegetables every day.  ¨ Eat at home more often. This gives you more control over what you eat.  ¨ Choose healthy foods when you eat out.  ¨ Learn what a healthy portion size is.  ¨ Keep low-fat snacks on hand.  ¨ Avoid sugary drinks, such as soda, fruit juice, iced tea sweetened with sugar, and flavored milk.  ¨ Eat a healthy breakfast.  · Drink enough water to keep your urine clear or pale yellow.  · Do not go without eating for long periods of time (do not fast) or follow a fad diet. Fasting and fad diets can be unhealthy and even dangerous.  Physical Activity   · Exercise regularly, as told by your health care provider. Ask your health care provider what types of exercise are safe for you and how often you should exercise.  · Warm up and stretch before being active.  · Cool down and stretch after being active.  · Rest between periods of activity.  Lifestyle   · Limit the time that you spend in front of your TV, computer, or video game system.  · Find ways to reward yourself that do not involve food.  · Limit alcohol intake to no more than 1 drink a day for nonpregnant women and 2 drinks a day for men. One drink equals 12 oz of beer, 5 oz of wine, or 1½ oz of hard liquor.  General instructions   · Keep a weight loss journal to keep track of the food you eat and how much you exercise you get.  · Take over-the-counter and prescription medicines only as told by your health care provider.  · Take vitamins and supplements only as told by your health care provider.  · Consider joining a support group. Your health care provider may be able to recommend a support group.  · Keep all follow-up visits as told by your health care  provider. This is important.  Contact a health care provider if:  · You are unable to meet your weight loss goal after 6 weeks of dietary and lifestyle changes.  This information is not intended to replace advice given to you by your health care provider. Make sure you discuss any questions you have with your health care provider.  Document Released: 01/25/2006 Document Revised: 05/22/2017 Document Reviewed: 10/05/2016  [employer] Interactive Patient Education © 2017 [employer]      IF YOU SMOKE OR USE TOBACCO PLEASE READ THE FOLLOWING:    Why is smoking bad for me?  Smoking increases the risk of heart disease, lung disease, vascular disease, stroke, and cancer.     If you smoke, STOP!    If you would like more information on quitting smoking, please visit the [employer] website: www.[employer]/corporate/healthier-together/smoke   This link will provide additional resources including the QUIT line and the Beat the Pack support groups.     For more information:    Quit Now [place]  1-800-QUIT-NOW  [email or web address].quitlogix.org/en-US/

## 2019-08-21 ENCOUNTER — APPOINTMENT (OUTPATIENT)
Dept: LAB | Facility: HOSPITAL | Age: 79
End: 2019-08-21

## 2019-08-21 LAB
ADV 40+41 DNA STL QL NAA+NON-PROBE: NOT DETECTED
ASTRO TYP 1-8 RNA STL QL NAA+NON-PROBE: NOT DETECTED
C CAYETANENSIS DNA STL QL NAA+NON-PROBE: NOT DETECTED
C DIFF TOX GENS STL QL NAA+PROBE: NOT DETECTED
CAMPY SP DNA.DIARRHEA STL QL NAA+PROBE: NOT DETECTED
CRYPTOSP STL CULT: NOT DETECTED
E COLI DNA SPEC QL NAA+PROBE: NOT DETECTED
E HISTOLYT AG STL-ACNC: NOT DETECTED
EAEC PAA PLAS AGGR+AATA ST NAA+NON-PRB: NOT DETECTED
EC STX1 + STX2 GENES STL NAA+PROBE: NOT DETECTED
EPEC EAE GENE STL QL NAA+NON-PROBE: NOT DETECTED
ETEC LTA+ST1A+ST1B TOX ST NAA+NON-PROBE: NOT DETECTED
G LAMBLIA DNA SPEC QL NAA+PROBE: NOT DETECTED
NOROVIRUS GI+II RNA STL QL NAA+NON-PROBE: NOT DETECTED
P SHIGELLOIDES DNA STL QL NAA+PROBE: NOT DETECTED
RV RNA STL NAA+PROBE: NOT DETECTED
SALMONELLA DNA SPEC QL NAA+PROBE: NOT DETECTED
SAPO I+II+IV+V RNA STL QL NAA+NON-PROBE: NOT DETECTED
SHIGELLA SP+EIEC IPAH STL QL NAA+PROBE: NOT DETECTED
V CHOLERAE DNA SPEC QL NAA+PROBE: NOT DETECTED
VIBRIO DNA SPEC NAA+PROBE: NOT DETECTED
YERSINIA STL CULT: NOT DETECTED

## 2019-08-21 PROCEDURE — 87205 SMEAR GRAM STAIN: CPT | Performed by: CLINICAL NURSE SPECIALIST

## 2019-08-21 PROCEDURE — 0097U HC BIOFIRE FILMARRAY GI PANEL: CPT | Performed by: CLINICAL NURSE SPECIALIST

## 2019-08-22 LAB — WBC STL QL MICRO: ABNORMAL

## 2022-03-27 ENCOUNTER — APPOINTMENT (OUTPATIENT)
Dept: GENERAL RADIOLOGY | Facility: HOSPITAL | Age: 82
End: 2022-03-27

## 2022-03-27 ENCOUNTER — HOSPITAL ENCOUNTER (EMERGENCY)
Facility: HOSPITAL | Age: 82
Discharge: HOME | End: 2022-03-27
Attending: EMERGENCY MEDICINE | Admitting: EMERGENCY MEDICINE

## 2022-03-27 ENCOUNTER — HOSPITAL ENCOUNTER (EMERGENCY)
Facility: HOSPITAL | Age: 82
Discharge: HOME | End: 2022-03-27
Admitting: EMERGENCY MEDICINE

## 2022-03-27 VITALS
OXYGEN SATURATION: 98 % | WEIGHT: 186 LBS | HEART RATE: 55 BPM | TEMPERATURE: 98.8 F | SYSTOLIC BLOOD PRESSURE: 134 MMHG | DIASTOLIC BLOOD PRESSURE: 45 MMHG | BODY MASS INDEX: 24.65 KG/M2 | HEIGHT: 73 IN | RESPIRATION RATE: 18 BRPM

## 2022-03-27 VITALS
SYSTOLIC BLOOD PRESSURE: 149 MMHG | OXYGEN SATURATION: 95 % | TEMPERATURE: 98.6 F | WEIGHT: 186 LBS | DIASTOLIC BLOOD PRESSURE: 46 MMHG | HEIGHT: 73 IN | RESPIRATION RATE: 22 BRPM | BODY MASS INDEX: 24.65 KG/M2 | HEART RATE: 66 BPM

## 2022-03-27 DIAGNOSIS — M25.511 ACUTE PAIN OF RIGHT SHOULDER: Primary | ICD-10-CM

## 2022-03-27 LAB
ALBUMIN SERPL-MCNC: 3.6 G/DL (ref 3.5–5.2)
ALBUMIN/GLOB SERPL: 0.9 G/DL
ALP SERPL-CCNC: 51 U/L (ref 39–117)
ALT SERPL W P-5'-P-CCNC: 10 U/L (ref 1–41)
ANION GAP SERPL CALCULATED.3IONS-SCNC: 13 MMOL/L (ref 5–15)
AST SERPL-CCNC: 21 U/L (ref 1–40)
BASOPHILS # BLD AUTO: 0.03 10*3/MM3 (ref 0–0.2)
BASOPHILS NFR BLD AUTO: 0.2 % (ref 0–1.5)
BILIRUB SERPL-MCNC: 1.1 MG/DL (ref 0–1.2)
BUN SERPL-MCNC: 40 MG/DL (ref 8–23)
BUN/CREAT SERPL: 24.4 (ref 7–25)
CALCIUM SPEC-SCNC: 9.6 MG/DL (ref 8.6–10.5)
CHLORIDE SERPL-SCNC: 101 MMOL/L (ref 98–107)
CO2 SERPL-SCNC: 22 MMOL/L (ref 22–29)
CREAT SERPL-MCNC: 1.64 MG/DL (ref 0.76–1.27)
CRP SERPL-MCNC: 3.83 MG/DL (ref 0–0.5)
DEPRECATED RDW RBC AUTO: 54.4 FL (ref 37–54)
EGFRCR SERPLBLD CKD-EPI 2021: 41.8 ML/MIN/1.73
EOSINOPHIL # BLD AUTO: 0 10*3/MM3 (ref 0–0.4)
EOSINOPHIL NFR BLD AUTO: 0 % (ref 0.3–6.2)
ERYTHROCYTE [DISTWIDTH] IN BLOOD BY AUTOMATED COUNT: 15.7 % (ref 12.3–15.4)
GLOBULIN UR ELPH-MCNC: 3.9 GM/DL
GLUCOSE SERPL-MCNC: 126 MG/DL (ref 65–99)
HCT VFR BLD AUTO: 26.8 % (ref 37.5–51)
HGB BLD-MCNC: 9.1 G/DL (ref 13–17.7)
IMM GRANULOCYTES # BLD AUTO: 0.05 10*3/MM3 (ref 0–0.05)
IMM GRANULOCYTES NFR BLD AUTO: 0.4 % (ref 0–0.5)
INR PPP: 1.16 (ref 0.91–1.09)
LYMPHOCYTES # BLD AUTO: 0.71 10*3/MM3 (ref 0.7–3.1)
LYMPHOCYTES NFR BLD AUTO: 5.6 % (ref 19.6–45.3)
MCH RBC QN AUTO: 32.9 PG (ref 26.6–33)
MCHC RBC AUTO-ENTMCNC: 34 G/DL (ref 31.5–35.7)
MCV RBC AUTO: 96.8 FL (ref 79–97)
MONOCYTES # BLD AUTO: 1.98 10*3/MM3 (ref 0.1–0.9)
MONOCYTES NFR BLD AUTO: 15.5 % (ref 5–12)
NEUTROPHILS NFR BLD AUTO: 10.01 10*3/MM3 (ref 1.7–7)
NEUTROPHILS NFR BLD AUTO: 78.3 % (ref 42.7–76)
NRBC BLD AUTO-RTO: 0 /100 WBC (ref 0–0.2)
PLATELET # BLD AUTO: 214 10*3/MM3 (ref 140–450)
PMV BLD AUTO: 11 FL (ref 6–12)
POTASSIUM SERPL-SCNC: 4.3 MMOL/L (ref 3.5–5.2)
PROT SERPL-MCNC: 7.5 G/DL (ref 6–8.5)
PROTHROMBIN TIME: 14.4 SECONDS (ref 11.9–14.6)
RBC # BLD AUTO: 2.77 10*6/MM3 (ref 4.14–5.8)
SODIUM SERPL-SCNC: 136 MMOL/L (ref 136–145)
TROPONIN T SERPL-MCNC: <0.01 NG/ML (ref 0–0.03)
WBC NRBC COR # BLD: 12.78 10*3/MM3 (ref 3.4–10.8)

## 2022-03-27 PROCEDURE — 36415 COLL VENOUS BLD VENIPUNCTURE: CPT

## 2022-03-27 PROCEDURE — 86140 C-REACTIVE PROTEIN: CPT | Performed by: NURSE PRACTITIONER

## 2022-03-27 PROCEDURE — 93005 ELECTROCARDIOGRAM TRACING: CPT | Performed by: EMERGENCY MEDICINE

## 2022-03-27 PROCEDURE — 85025 COMPLETE CBC W/AUTO DIFF WBC: CPT | Performed by: NURSE PRACTITIONER

## 2022-03-27 PROCEDURE — 85610 PROTHROMBIN TIME: CPT | Performed by: NURSE PRACTITIONER

## 2022-03-27 PROCEDURE — 99283 EMERGENCY DEPT VISIT LOW MDM: CPT

## 2022-03-27 PROCEDURE — 73030 X-RAY EXAM OF SHOULDER: CPT

## 2022-03-27 PROCEDURE — 93010 ELECTROCARDIOGRAM REPORT: CPT | Performed by: INTERNAL MEDICINE

## 2022-03-27 PROCEDURE — 84484 ASSAY OF TROPONIN QUANT: CPT | Performed by: EMERGENCY MEDICINE

## 2022-03-27 PROCEDURE — 80053 COMPREHEN METABOLIC PANEL: CPT | Performed by: NURSE PRACTITIONER

## 2022-03-27 RX ORDER — LIDOCAINE 50 MG/G
1 PATCH TOPICAL EVERY 24 HOURS
Qty: 6 PATCH | Refills: 0 | Status: SHIPPED | OUTPATIENT
Start: 2022-03-27 | End: 2022-04-02

## 2022-03-27 RX ORDER — HYDROCODONE BITARTRATE AND ACETAMINOPHEN 5; 325 MG/1; MG/1
1 TABLET ORAL ONCE
Status: COMPLETED | OUTPATIENT
Start: 2022-03-27 | End: 2022-03-27

## 2022-03-27 RX ADMIN — HYDROCODONE BITARTRATE AND ACETAMINOPHEN 1 TABLET: 5; 325 TABLET ORAL at 10:39

## 2022-03-28 LAB
QT INTERVAL: 426 MS
QTC INTERVAL: 452 MS

## 2022-03-28 NOTE — DISCHARGE INSTRUCTIONS
Increase fluids.  Continue previously prescribed medication from earlier today.  Follow up with PCP tomorrow - call for appointment. Return to ED if condition does not improve or worsens

## 2022-03-28 NOTE — ED PROVIDER NOTES
Subjective   81 yom with PMH of CAD, HTN, thyroid disease, DM and hyperlipidemia presents with c/o R shoulder pain.  He states he woke up yesterday with the pain in his shoulder.  He reports he did move around several pieces of scrap metal that weighed up to 100 lbs.  No specific injury noted.  He denies CP or SOB.  He reports a similar problem a few years ago and his received an injected and his pain improved.  He denies weakness.  He denies numbness or tingling of the extremity.  He denies CP or SOB.      He was seen here earlier today for the same complaint.  He had an unremarkable xray and had a sling placed.  He was given rx for lidocaine patches and voltaren gel.  He states he used both but he is still in pain.                Review of Systems   Constitutional: Negative for activity change, appetite change, fatigue and fever.   HENT: Negative for congestion, ear pain, facial swelling and sore throat.    Eyes: Negative for discharge and visual disturbance.   Respiratory: Negative for apnea, chest tightness, shortness of breath, wheezing and stridor.    Cardiovascular: Negative for chest pain and palpitations.   Gastrointestinal: Negative for abdominal distention, abdominal pain, diarrhea, nausea and vomiting.   Genitourinary: Negative for difficulty urinating and dysuria.   Musculoskeletal: Negative for arthralgias and myalgias.   Skin: Negative for rash and wound.   Neurological: Negative for dizziness and seizures.   Psychiatric/Behavioral: Negative for agitation and confusion.       Past Medical History:   Diagnosis Date   • Coronary artery disease    • Depression    • Diabetes mellitus (HCC)    • Disease of thyroid gland    • Elevated cholesterol    • Hypertension        No Known Allergies    Past Surgical History:   Procedure Laterality Date   • BACK SURGERY     • CAROTID ARTERY - SUBCLAVIAN ARTERY BYPASS GRAFT     • CHOLECYSTECTOMY     • COLONOSCOPY  09/28/2012    Normal exam   • COLONOSCOPY  02/26/2018     Normal exam repeat prn   • ENDOSCOPY N/A 5/28/2019    A large amount of food residue in the stomach otherwise normal exam.   • GALLBLADDER SURGERY         Family History   Problem Relation Age of Onset   • No Known Problems Father    • No Known Problems Mother    • Diabetes Brother    • Colon cancer Neg Hx    • Colon polyps Neg Hx        Social History     Socioeconomic History   • Marital status:    Tobacco Use   • Smoking status: Current Every Day Smoker     Packs/day: 1.00     Types: Cigarettes   • Smokeless tobacco: Never Used   Substance and Sexual Activity   • Alcohol use: No   • Drug use: Defer   • Sexual activity: Defer           Objective   Physical Exam  Vitals and nursing note reviewed.   Constitutional:       Appearance: He is well-developed.   HENT:      Head: Normocephalic.   Eyes:      Pupils: Pupils are equal, round, and reactive to light.   Cardiovascular:      Rate and Rhythm: Normal rate and regular rhythm.      Heart sounds: No murmur heard.  Pulmonary:      Effort: Pulmonary effort is normal.      Breath sounds: Normal breath sounds.   Abdominal:      General: Bowel sounds are normal.      Palpations: Abdomen is soft.   Musculoskeletal:      Right shoulder: Tenderness present. No swelling or deformity. Decreased range of motion. Normal strength. Normal pulse.        Arms:       Cervical back: Normal range of motion and neck supple.      Comments: The RUE is pink, warm and dry with +PMS.  There is no deformity.  He has tenderness to the anterior portion of the shoulder. He has limited ROM of the shoulder secondary to pain.   Skin:     General: Skin is warm and dry.   Neurological:      Mental Status: He is alert and oriented to person, place, and time.         Procedures           ED Course  ED Course as of 03/28/22 1429   Sun Mar 27, 2022   0076 Lab work reviewed with Dr Hughes.  I will give him a dose of toradol prior to discharge.  He was ordered lidocaine patches and voltaren gel  earlier today. I stressed importance of him seeing PCP to discuss MRI.  He voiced understanding of all results and instructions. [KS]      ED Course User Index  [KS] Deana Styles APRN                                                 MDM  Number of Diagnoses or Management Options  Acute pain of right shoulder: minor     Amount and/or Complexity of Data Reviewed  Tests in the radiology section of CPT®: ordered and reviewed  Decide to obtain previous medical records or to obtain history from someone other than the patient: yes  Discuss the patient with other providers: yes    Risk of Complications, Morbidity, and/or Mortality  Presenting problems: minimal  Diagnostic procedures: minimal  Management options: minimal    Patient Progress  Patient progress: stable      Final diagnoses:   Acute pain of right shoulder       ED Disposition  ED Disposition     ED Disposition   Discharge    Condition   Stable    Comment   --             Jcarlos Ro PA  [id number]  [place] [id number] [phone]    Schedule an appointment as soon as possible for a visit in 1 day  Routine ED follow up         Medication List      No changes were made to your prescriptions during this visit.          Deana Styles APRN  03/28/22 2846

## 2023-02-10 ENCOUNTER — HOSPITAL ENCOUNTER (OUTPATIENT)
Dept: ULTRASOUND IMAGING | Age: 83
Discharge: HOME | End: 2023-02-10
Payer: OTHER GOVERNMENT

## 2023-02-10 DIAGNOSIS — N18.31 STAGE 3A CHRONIC KIDNEY DISEASE (HCC): ICD-10-CM

## 2023-02-10 PROCEDURE — 76770 US EXAM ABDO BACK WALL COMP: CPT

## 2023-03-03 ENCOUNTER — HOSPITAL ENCOUNTER (EMERGENCY)
Facility: HOSPITAL | Age: 83
Discharge: HOME | End: 2023-03-03
Attending: FAMILY MEDICINE | Admitting: FAMILY MEDICINE
Payer: MEDICARE

## 2023-03-03 VITALS
HEART RATE: 64 BPM | OXYGEN SATURATION: 94 % | TEMPERATURE: 97.6 F | BODY MASS INDEX: 23.33 KG/M2 | HEIGHT: 73 IN | RESPIRATION RATE: 16 BRPM | DIASTOLIC BLOOD PRESSURE: 56 MMHG | WEIGHT: 176 LBS | SYSTOLIC BLOOD PRESSURE: 175 MMHG

## 2023-03-03 DIAGNOSIS — L03.119 CELLULITIS OF LOWER LEG: Primary | ICD-10-CM

## 2023-03-03 LAB
ALBUMIN SERPL-MCNC: 4 G/DL (ref 3.5–5.2)
ALBUMIN/GLOB SERPL: 1 G/DL
ALP SERPL-CCNC: 61 U/L (ref 39–117)
ALT SERPL W P-5'-P-CCNC: 9 U/L (ref 1–41)
ANION GAP SERPL CALCULATED.3IONS-SCNC: 12 MMOL/L (ref 5–15)
AST SERPL-CCNC: 32 U/L (ref 1–40)
BASOPHILS # BLD AUTO: 0.06 10*3/MM3 (ref 0–0.2)
BASOPHILS NFR BLD AUTO: 0.4 % (ref 0–1.5)
BILIRUB SERPL-MCNC: 0.7 MG/DL (ref 0–1.2)
BUN SERPL-MCNC: 32 MG/DL (ref 8–23)
BUN/CREAT SERPL: 24.6 (ref 7–25)
CALCIUM SPEC-SCNC: 9.7 MG/DL (ref 8.6–10.5)
CHLORIDE SERPL-SCNC: 102 MMOL/L (ref 98–107)
CK SERPL-CCNC: 204 U/L (ref 20–200)
CO2 SERPL-SCNC: 24 MMOL/L (ref 22–29)
CREAT SERPL-MCNC: 1.3 MG/DL (ref 0.76–1.27)
CRP SERPL-MCNC: 1.81 MG/DL (ref 0–0.5)
D-LACTATE SERPL-SCNC: 1 MMOL/L (ref 0.5–2)
DEPRECATED RDW RBC AUTO: 55.3 FL (ref 37–54)
EGFRCR SERPLBLD CKD-EPI 2021: 54.8 ML/MIN/1.73
EOSINOPHIL # BLD AUTO: 0.28 10*3/MM3 (ref 0–0.4)
EOSINOPHIL NFR BLD AUTO: 2 % (ref 0.3–6.2)
ERYTHROCYTE [DISTWIDTH] IN BLOOD BY AUTOMATED COUNT: 15.9 % (ref 12.3–15.4)
ERYTHROCYTE [SEDIMENTATION RATE] IN BLOOD: 100 MM/HR (ref 0–20)
GLOBULIN UR ELPH-MCNC: 4.1 GM/DL
GLUCOSE SERPL-MCNC: 102 MG/DL (ref 65–99)
HCT VFR BLD AUTO: 31.9 % (ref 37.5–51)
HGB BLD-MCNC: 10.3 G/DL (ref 13–17.7)
HOLD SPECIMEN: NORMAL
HOLD SPECIMEN: NORMAL
IMM GRANULOCYTES # BLD AUTO: 0.08 10*3/MM3 (ref 0–0.05)
IMM GRANULOCYTES NFR BLD AUTO: 0.6 % (ref 0–0.5)
LYMPHOCYTES # BLD AUTO: 1.18 10*3/MM3 (ref 0.7–3.1)
LYMPHOCYTES NFR BLD AUTO: 8.4 % (ref 19.6–45.3)
MAGNESIUM SERPL-MCNC: 1.7 MG/DL (ref 1.6–2.4)
MCH RBC QN AUTO: 31.3 PG (ref 26.6–33)
MCHC RBC AUTO-ENTMCNC: 32.3 G/DL (ref 31.5–35.7)
MCV RBC AUTO: 97 FL (ref 79–97)
MONOCYTES # BLD AUTO: 1.6 10*3/MM3 (ref 0.1–0.9)
MONOCYTES NFR BLD AUTO: 11.4 % (ref 5–12)
NEUTROPHILS NFR BLD AUTO: 10.81 10*3/MM3 (ref 1.7–7)
NEUTROPHILS NFR BLD AUTO: 77.2 % (ref 42.7–76)
NRBC BLD AUTO-RTO: 0 /100 WBC (ref 0–0.2)
PLATELET # BLD AUTO: 281 10*3/MM3 (ref 140–450)
PMV BLD AUTO: 11.4 FL (ref 6–12)
POTASSIUM SERPL-SCNC: 4.2 MMOL/L (ref 3.5–5.2)
PROCALCITONIN SERPL-MCNC: 0.03 NG/ML (ref 0–0.25)
PROT SERPL-MCNC: 8.1 G/DL (ref 6–8.5)
RBC # BLD AUTO: 3.29 10*6/MM3 (ref 4.14–5.8)
SODIUM SERPL-SCNC: 138 MMOL/L (ref 136–145)
WBC NRBC COR # BLD: 14.01 10*3/MM3 (ref 3.4–10.8)
WHOLE BLOOD HOLD COAG: NORMAL

## 2023-03-03 PROCEDURE — 85652 RBC SED RATE AUTOMATED: CPT | Performed by: FAMILY MEDICINE

## 2023-03-03 PROCEDURE — 99283 EMERGENCY DEPT VISIT LOW MDM: CPT

## 2023-03-03 PROCEDURE — 85025 COMPLETE CBC W/AUTO DIFF WBC: CPT | Performed by: FAMILY MEDICINE

## 2023-03-03 PROCEDURE — 87205 SMEAR GRAM STAIN: CPT | Performed by: FAMILY MEDICINE

## 2023-03-03 PROCEDURE — 84145 PROCALCITONIN (PCT): CPT | Performed by: FAMILY MEDICINE

## 2023-03-03 PROCEDURE — 83735 ASSAY OF MAGNESIUM: CPT | Performed by: FAMILY MEDICINE

## 2023-03-03 PROCEDURE — 83605 ASSAY OF LACTIC ACID: CPT | Performed by: FAMILY MEDICINE

## 2023-03-03 PROCEDURE — 86140 C-REACTIVE PROTEIN: CPT | Performed by: FAMILY MEDICINE

## 2023-03-03 PROCEDURE — 82550 ASSAY OF CK (CPK): CPT | Performed by: FAMILY MEDICINE

## 2023-03-03 PROCEDURE — 87070 CULTURE OTHR SPECIMN AEROBIC: CPT | Performed by: FAMILY MEDICINE

## 2023-03-03 PROCEDURE — 80053 COMPREHEN METABOLIC PANEL: CPT | Performed by: FAMILY MEDICINE

## 2023-03-03 RX ORDER — SODIUM CHLORIDE 0.9 % (FLUSH) 0.9 %
10 SYRINGE (ML) INJECTION AS NEEDED
Status: DISCONTINUED | OUTPATIENT
Start: 2023-03-03 | End: 2023-03-03 | Stop reason: HOSPADM

## 2023-03-03 RX ORDER — DOXYCYCLINE 100 MG/1
100 CAPSULE ORAL 2 TIMES DAILY
Qty: 14 CAPSULE | Refills: 0 | Status: SHIPPED | OUTPATIENT
Start: 2023-03-03 | End: 2023-03-10

## 2023-03-03 NOTE — ED PROVIDER NOTES
Subjective   History of Present Illness  This is a 82-year-old male who presents emergency room today with complaints of his right lower leg having blisters.  Patient states that for about 3 weeks he has had some swelling of his lower extremity.  Patient states that his doctor prescribed him a compression stocking.  Patient states that since then the blisters have been worsening.  Patient states that he is also had a headache on and off for the last couple weeks.  Patient states that he tried taking ibuprofen with no improvement of the headache.  Patient states that he has had no visual changes.  Patient has had no slurred speech.  Patient has no dizziness.  Patient has no weakness of the extremities.  Patient has no numbness and tingling of the extremities.  Patient has no chest pain or shortness of breath.  Patient has no fevers or chills.  Patient denies any diaphoresis.        Review of Systems   Neurological: Positive for headaches.   All other systems reviewed and are negative.      Past Medical History:   Diagnosis Date   • Coronary artery disease    • Depression    • Diabetes mellitus (HCC)    • Disease of thyroid gland    • Elevated cholesterol    • Hypertension        No Known Allergies    Past Surgical History:   Procedure Laterality Date   • BACK SURGERY     • CAROTID ARTERY - SUBCLAVIAN ARTERY BYPASS GRAFT     • CHOLECYSTECTOMY     • COLONOSCOPY  09/28/2012    Normal exam   • COLONOSCOPY  02/26/2018    Normal exam repeat prn   • ENDOSCOPY N/A 5/28/2019    A large amount of food residue in the stomach otherwise normal exam.   • GALLBLADDER SURGERY         Family History   Problem Relation Age of Onset   • No Known Problems Father    • No Known Problems Mother    • Diabetes Brother    • Colon cancer Neg Hx    • Colon polyps Neg Hx        Social History     Socioeconomic History   • Marital status:    Tobacco Use   • Smoking status: Every Day     Packs/day: 1.00     Types: Cigarettes   • Smokeless  tobacco: Never   Substance and Sexual Activity   • Alcohol use: No   • Drug use: Defer   • Sexual activity: Defer           Objective   Physical Exam  Vitals and nursing note reviewed.   Constitutional:       Appearance: Normal appearance.   HENT:      Head: Normocephalic and atraumatic.      Mouth/Throat:      Mouth: Mucous membranes are moist.   Eyes:      Extraocular Movements: Extraocular movements intact.      Pupils: Pupils are equal, round, and reactive to light.   Cardiovascular:      Rate and Rhythm: Normal rate and regular rhythm.      Heart sounds: Normal heart sounds.   Pulmonary:      Effort: Pulmonary effort is normal.      Breath sounds: Normal breath sounds.   Abdominal:      General: Bowel sounds are normal.      Palpations: Abdomen is soft.      Tenderness: There is no abdominal tenderness.   Skin:     General: Skin is warm and dry.      Comments: Right anterior lower extremity lower tibia-fibula region swelling, erythema, purulence.,  Tender to palpation.   Neurological:      General: No focal deficit present.      Mental Status: He is alert and oriented to person, place, and time.      Cranial Nerves: No cranial nerve deficit.      Sensory: No sensory deficit.      Motor: No weakness.   Psychiatric:         Mood and Affect: Mood normal.         Behavior: Behavior normal.         Procedures           ED Course                                         Lab Results (last 24 hours)     Procedure Component Value Units Date/Time    CBC & Differential [[id number]]  (Abnormal) Collected: 03/03/23 1048    Specimen: Blood Updated: 03/03/23 1106    Narrative:      The following orders were created for panel order CBC & Differential.  Procedure                               Abnormality         Status                     ---------                               -----------         ------                     CBC Auto Differential[[id number]]        Abnormal            Final result                 Please view  "results for these tests on the individual orders.    Comprehensive Metabolic Panel [[id number]]  (Abnormal) Collected: 03/03/23 1048    Specimen: Blood Updated: 03/03/23 1131     Glucose 102 mg/dL      BUN 32 mg/dL      Creatinine 1.30 mg/dL      Sodium 138 mmol/L      Potassium 4.2 mmol/L      Comment: Slight hemolysis detected by analyzer. Results may be affected.        Chloride 102 mmol/L      CO2 24.0 mmol/L      Calcium 9.7 mg/dL      Total Protein 8.1 g/dL      Albumin 4.0 g/dL      ALT (SGPT) 9 U/L      AST (SGOT) 32 U/L      Comment: Slight hemolysis detected by analyzer. Results may be affected.        Alkaline Phosphatase 61 U/L      Total Bilirubin 0.7 mg/dL      Globulin 4.1 gm/dL      A/G Ratio 1.0 g/dL      BUN/Creatinine Ratio 24.6     Anion Gap 12.0 mmol/L      eGFR 54.8 mL/min/1.73     Narrative:      GFR Normal >60  Chronic Kidney Disease <60  Kidney Failure <15    The GFR formula is only valid for adults with stable renal function between ages 18 and 70.    Lactic Acid, Plasma [[id number]]  (Normal) Collected: 03/03/23 1048    Specimen: Blood Updated: 03/03/23 1125     Lactate 1.0 mmol/L     Procalcitonin [[id number]]  (Normal) Collected: 03/03/23 1048    Specimen: Blood Updated: 03/03/23 1132     Procalcitonin 0.03 ng/mL     Narrative:      As a Marker for Sepsis (Non-Neonates):    1. <0.5 ng/mL represents a low risk of severe sepsis and/or septic shock.  2. >2 ng/mL represents a high risk of severe sepsis and/or septic shock.    As a Marker for Lower Respiratory Tract Infections that require antibiotic therapy:    PCT on Admission    Antibiotic Therapy       6-12 Hrs later    >0.5                Strongly Recommended  >0.25 - <0.5        Recommended   0.1 - 0.25          Discouraged              Remeasure/reassess PCT  <0.1                Strongly Discouraged     Remeasure/reassess PCT    As 28 day mortality risk marker: \"Change in Procalcitonin Result\" (>80% or <=80%) if Day 0 (or Day 1) and Day 4 " values are available. Refer to http://www.[email or web address]-pct-calculator.com    Change in PCT <=80%  A decrease of PCT levels below or equal to 80% defines a positive change in PCT test result representing a higher risk for 28-day all-cause mortality of patients diagnosed with severe sepsis for septic shock.    Change in PCT >80%  A decrease of PCT levels of more than 80% defines a negative change in PCT result representing a lower risk for 28-day all-cause mortality of patients diagnosed with severe sepsis or septic shock.       Sedimentation Rate [[id number]]  (Abnormal) Collected: 03/03/23 1048    Specimen: Blood Updated: 03/03/23 1113     Sed Rate 100 mm/hr     Wound Culture - Wound, Leg, Right [[id number]] Collected: 03/03/23 1048    Specimen: Wound from Leg, Right Updated: 03/03/23 1100    C-reactive Protein [[id number]]  (Abnormal) Collected: 03/03/23 1048    Specimen: Blood Updated: 03/03/23 1127     C-Reactive Protein 1.81 mg/dL     CK [[id number]]  (Abnormal) Collected: 03/03/23 1048    Specimen: Blood Updated: 03/03/23 1129     Creatine Kinase 204 U/L     Magnesium [[id number]]  (Normal) Collected: 03/03/23 1048    Specimen: Blood Updated: 03/03/23 1124     Magnesium 1.7 mg/dL     CBC Auto Differential [[id number]]  (Abnormal) Collected: 03/03/23 1048    Specimen: Blood Updated: 03/03/23 1106     WBC 14.01 10*3/mm3      RBC 3.29 10*6/mm3      Hemoglobin 10.3 g/dL      Hematocrit 31.9 %      MCV 97.0 fL      MCH 31.3 pg      MCHC 32.3 g/dL      RDW 15.9 %      RDW-SD 55.3 fl      MPV 11.4 fL      Platelets 281 10*3/mm3      Neutrophil % 77.2 %      Lymphocyte % 8.4 %      Monocyte % 11.4 %      Eosinophil % 2.0 %      Basophil % 0.4 %      Immature Grans % 0.6 %      Neutrophils, Absolute 10.81 10*3/mm3      Lymphocytes, Absolute 1.18 10*3/mm3      Monocytes, Absolute 1.60 10*3/mm3      Eosinophils, Absolute 0.28 10*3/mm3      Basophils, Absolute 0.06 10*3/mm3      Immature Grans, Absolute 0.08 10*3/mm3      nRBC 0.0  /100 WBC     [place] Blood Culture Bottle Set [[id number]] Collected: 03/03/23 1049    Specimen: Blood from Wrist, Right Updated: 03/03/23 1200     Extra Tube Hold for add-ons.     Comment: Auto resulted.             Medical Decision Making  This was a 82-year-old male who presented to the emergency room with a right lower leg wound.  Patient does appear to have cellulitis of his lower leg.  Patient does not appear to be septic.  I offer the patient admission for IV antibiotics.  Patient states that he would like to do outpatient therapy with p.o. antibiotics at this time.  Doxycycline has been sent to the patient's pharmacy.  Patient will be advised to follow-up with his primary care provider soon as possible.  Patient be advised to return to the emergency room with new or worsening symptoms.  All questions were answered for the patient prior to discharge.  Patient verbalized understanding was agreeable with plan as discussed.    Cellulitis of lower leg: acute illness or injury  Amount and/or Complexity of Data Reviewed  Labs: ordered. Decision-making details documented in ED Course.      Risk  Prescription drug management.          Final diagnoses:   Cellulitis of lower leg       ED Disposition  ED Disposition     ED Disposition   Discharge    Condition   Stable    Comment   --             Jcarlos Ro, PA  [address]  [facility] [id number] [phone]    Schedule an appointment as soon as possible for a visit       [facility] Emergency Department  [address] [place] [id number] [phone]    As needed, If symptoms worsen         Medication List      New Prescriptions    doxycycline 100 MG capsule  Commonly known as: MONODOX  Take 1 capsule by mouth 2 (Two) Times a Day for 7 days.           Where to Get Your Medications      These medications were sent to [facility] [address] [place] [address] [facility] [phone] [facility] [phone]   [address] [facility]  [facility] [address]    Phone: [phone]   · doxycycline 100 MG capsule          Tima Jaime MD  03/03/23 0651

## 2023-03-05 LAB
BACTERIA SPEC AEROBE CULT: NORMAL
GRAM STN SPEC: NORMAL

## 2023-05-30 ENCOUNTER — APPOINTMENT (OUTPATIENT)
Dept: GENERAL RADIOLOGY | Facility: HOSPITAL | Age: 83
DRG: 287 | End: 2023-05-30
Payer: MEDICARE

## 2023-05-30 ENCOUNTER — APPOINTMENT (OUTPATIENT)
Dept: CARDIOLOGY | Facility: HOSPITAL | Age: 83
DRG: 287 | End: 2023-05-30
Payer: MEDICARE

## 2023-05-30 ENCOUNTER — APPOINTMENT (OUTPATIENT)
Dept: CT IMAGING | Facility: HOSPITAL | Age: 83
DRG: 287 | End: 2023-05-30
Payer: MEDICARE

## 2023-05-30 ENCOUNTER — HOSPITAL ENCOUNTER (INPATIENT)
Facility: HOSPITAL | Age: 83
LOS: 2 days | Discharge: HOME | DRG: 287 | End: 2023-06-01
Attending: EMERGENCY MEDICINE | Admitting: INTERNAL MEDICINE
Payer: MEDICARE

## 2023-05-30 DIAGNOSIS — I25.119 ATHEROSCLEROSIS OF CORONARY ARTERY OF NATIVE HEART WITH ANGINA PECTORIS, UNSPECIFIED VESSEL OR LESION TYPE: Primary | ICD-10-CM

## 2023-05-30 DIAGNOSIS — R94.39 ABNORMAL STRESS TEST: ICD-10-CM

## 2023-05-30 DIAGNOSIS — I20.0 UNSTABLE ANGINA PECTORIS: ICD-10-CM

## 2023-05-30 LAB
ALBUMIN SERPL-MCNC: 3.4 G/DL (ref 3.5–5.2)
ALBUMIN/GLOB SERPL: 0.9 G/DL
ALP SERPL-CCNC: 63 U/L (ref 39–117)
ALT SERPL W P-5'-P-CCNC: 17 U/L (ref 1–41)
ANION GAP SERPL CALCULATED.3IONS-SCNC: 10 MMOL/L (ref 5–15)
AST SERPL-CCNC: 29 U/L (ref 1–40)
BASOPHILS # BLD AUTO: 0.04 10*3/MM3 (ref 0–0.2)
BASOPHILS NFR BLD AUTO: 0.4 % (ref 0–1.5)
BH CV STRESS BP STAGE 1: NORMAL
BH CV STRESS BP STAGE 2: NORMAL
BH CV STRESS BP STAGE 3: NORMAL
BH CV STRESS DOB - ATROPINE STAGE 3: 0.3
BH CV STRESS DOSE DOBUTAMINE STAGE 1: 10
BH CV STRESS DOSE DOBUTAMINE STAGE 2: 20
BH CV STRESS DOSE DOBUTAMINE STAGE 3: 30
BH CV STRESS DURATION MIN STAGE 1: 3
BH CV STRESS DURATION MIN STAGE 2: 3
BH CV STRESS DURATION MIN STAGE 3: 2
BH CV STRESS DURATION SEC STAGE 1: 0
BH CV STRESS DURATION SEC STAGE 2: 0
BH CV STRESS DURATION SEC STAGE 3: 50
BH CV STRESS HR STAGE 1: 64
BH CV STRESS HR STAGE 2: 79
BH CV STRESS HR STAGE 3: 160
BH CV STRESS PROTOCOL 1: NORMAL
BH CV STRESS RECOVERY BP: NORMAL MMHG
BH CV STRESS RECOVERY HR: 75 BPM
BH CV STRESS STAGE 1: 1
BH CV STRESS STAGE 2: 2
BH CV STRESS STAGE 3: 3
BILIRUB SERPL-MCNC: 0.8 MG/DL (ref 0–1.2)
BUN SERPL-MCNC: 27 MG/DL (ref 8–23)
BUN/CREAT SERPL: 22.9 (ref 7–25)
CALCIUM SPEC-SCNC: 9.1 MG/DL (ref 8.6–10.5)
CHLORIDE SERPL-SCNC: 105 MMOL/L (ref 98–107)
CO2 SERPL-SCNC: 25 MMOL/L (ref 22–29)
CREAT SERPL-MCNC: 1.18 MG/DL (ref 0.76–1.27)
DEPRECATED RDW RBC AUTO: 57.1 FL (ref 37–54)
EGFRCR SERPLBLD CKD-EPI 2021: 61.6 ML/MIN/1.73
EOSINOPHIL # BLD AUTO: 0.08 10*3/MM3 (ref 0–0.4)
EOSINOPHIL NFR BLD AUTO: 0.8 % (ref 0.3–6.2)
ERYTHROCYTE [DISTWIDTH] IN BLOOD BY AUTOMATED COUNT: 16 % (ref 12.3–15.4)
GEN 5 2HR TROPONIN T REFLEX: 39 NG/L
GLOBULIN UR ELPH-MCNC: 3.7 GM/DL
GLUCOSE SERPL-MCNC: 122 MG/DL (ref 65–99)
HCT VFR BLD AUTO: 30.6 % (ref 37.5–51)
HGB BLD-MCNC: 9.6 G/DL (ref 13–17.7)
HOLD SPECIMEN: NORMAL
HOLD SPECIMEN: NORMAL
IMM GRANULOCYTES # BLD AUTO: 0.03 10*3/MM3 (ref 0–0.05)
IMM GRANULOCYTES NFR BLD AUTO: 0.3 % (ref 0–0.5)
INR PPP: 1.09 (ref 0.91–1.09)
LYMPHOCYTES # BLD AUTO: 0.97 10*3/MM3 (ref 0.7–3.1)
LYMPHOCYTES NFR BLD AUTO: 9.6 % (ref 19.6–45.3)
MAXIMAL PREDICTED HEART RATE: 138 BPM
MCH RBC QN AUTO: 30.8 PG (ref 26.6–33)
MCHC RBC AUTO-ENTMCNC: 31.4 G/DL (ref 31.5–35.7)
MCV RBC AUTO: 98.1 FL (ref 79–97)
MONOCYTES # BLD AUTO: 1.34 10*3/MM3 (ref 0.1–0.9)
MONOCYTES NFR BLD AUTO: 13.3 % (ref 5–12)
NEUTROPHILS NFR BLD AUTO: 7.62 10*3/MM3 (ref 1.7–7)
NEUTROPHILS NFR BLD AUTO: 75.6 % (ref 42.7–76)
NRBC BLD AUTO-RTO: 0 /100 WBC (ref 0–0.2)
PERCENT MAX PREDICTED HR: 115.94 %
PLATELET # BLD AUTO: 198 10*3/MM3 (ref 140–450)
PMV BLD AUTO: 11 FL (ref 6–12)
POTASSIUM SERPL-SCNC: 4.4 MMOL/L (ref 3.5–5.2)
PROT SERPL-MCNC: 7.1 G/DL (ref 6–8.5)
PROTHROMBIN TIME: 14.2 SECONDS (ref 11.8–14.8)
RBC # BLD AUTO: 3.12 10*6/MM3 (ref 4.14–5.8)
SODIUM SERPL-SCNC: 140 MMOL/L (ref 136–145)
STRESS BASELINE BP: NORMAL MMHG
STRESS BASELINE HR: 63 BPM
STRESS PERCENT HR: 136 %
STRESS POST EXERCISE DUR MIN: 8 MIN
STRESS POST EXERCISE DUR SEC: 50 SEC
STRESS POST PEAK BP: NORMAL MMHG
STRESS POST PEAK HR: 160 BPM
STRESS TARGET HR: 117 BPM
TROPONIN T DELTA: -1 NG/L
TROPONIN T SERPL HS-MCNC: 40 NG/L
WBC NRBC COR # BLD: 10.08 10*3/MM3 (ref 3.4–10.8)
WHOLE BLOOD HOLD COAG: NORMAL
WHOLE BLOOD HOLD SPECIMEN: NORMAL

## 2023-05-30 PROCEDURE — 93350 STRESS TTE ONLY: CPT | Performed by: INTERNAL MEDICINE

## 2023-05-30 PROCEDURE — 80053 COMPREHEN METABOLIC PANEL: CPT | Performed by: EMERGENCY MEDICINE

## 2023-05-30 PROCEDURE — 25010000002 ATROPINE SULFATE: Performed by: INTERNAL MEDICINE

## 2023-05-30 PROCEDURE — 85025 COMPLETE CBC W/AUTO DIFF WBC: CPT | Performed by: EMERGENCY MEDICINE

## 2023-05-30 PROCEDURE — 93018 CV STRESS TEST I&R ONLY: CPT | Performed by: INTERNAL MEDICINE

## 2023-05-30 PROCEDURE — 93350 STRESS TTE ONLY: CPT

## 2023-05-30 PROCEDURE — 99285 EMERGENCY DEPT VISIT HI MDM: CPT

## 2023-05-30 PROCEDURE — 25510000001 IOPAMIDOL PER 1 ML: Performed by: EMERGENCY MEDICINE

## 2023-05-30 PROCEDURE — 25010000002 ENOXAPARIN PER 10 MG: Performed by: NURSE PRACTITIONER

## 2023-05-30 PROCEDURE — 84484 ASSAY OF TROPONIN QUANT: CPT | Performed by: EMERGENCY MEDICINE

## 2023-05-30 PROCEDURE — 71045 X-RAY EXAM CHEST 1 VIEW: CPT

## 2023-05-30 PROCEDURE — 94799 UNLISTED PULMONARY SVC/PX: CPT

## 2023-05-30 PROCEDURE — 93010 ELECTROCARDIOGRAM REPORT: CPT | Performed by: INTERNAL MEDICINE

## 2023-05-30 PROCEDURE — 93017 CV STRESS TEST TRACING ONLY: CPT

## 2023-05-30 PROCEDURE — 25010000002 MORPHINE PER 10 MG: Performed by: EMERGENCY MEDICINE

## 2023-05-30 PROCEDURE — 25010000002 ONDANSETRON PER 1 MG: Performed by: EMERGENCY MEDICINE

## 2023-05-30 PROCEDURE — 93352 ADMIN ECG CONTRAST AGENT: CPT | Performed by: INTERNAL MEDICINE

## 2023-05-30 PROCEDURE — 93005 ELECTROCARDIOGRAM TRACING: CPT | Performed by: EMERGENCY MEDICINE

## 2023-05-30 PROCEDURE — 94761 N-INVAS EAR/PLS OXIMETRY MLT: CPT

## 2023-05-30 PROCEDURE — 99223 1ST HOSP IP/OBS HIGH 75: CPT | Performed by: INTERNAL MEDICINE

## 2023-05-30 PROCEDURE — 94640 AIRWAY INHALATION TREATMENT: CPT

## 2023-05-30 PROCEDURE — 25510000001 PERFLUTREN 6.52 MG/ML SUSPENSION: Performed by: INTERNAL MEDICINE

## 2023-05-30 PROCEDURE — 85610 PROTHROMBIN TIME: CPT | Performed by: INTERNAL MEDICINE

## 2023-05-30 PROCEDURE — 71275 CT ANGIOGRAPHY CHEST: CPT

## 2023-05-30 PROCEDURE — 25010000002 DOBUTAMINE 250 MG/20ML SOLUTION: Performed by: INTERNAL MEDICINE

## 2023-05-30 PROCEDURE — 36415 COLL VENOUS BLD VENIPUNCTURE: CPT

## 2023-05-30 RX ORDER — CITALOPRAM 20 MG/1
40 TABLET ORAL DAILY
Status: DISCONTINUED | OUTPATIENT
Start: 2023-05-30 | End: 2023-06-01 | Stop reason: HOSPADM

## 2023-05-30 RX ORDER — METOPROLOL TARTRATE 5 MG/5ML
5 INJECTION INTRAVENOUS ONCE
Status: COMPLETED | OUTPATIENT
Start: 2023-05-30 | End: 2023-05-30

## 2023-05-30 RX ORDER — HYDRALAZINE HYDROCHLORIDE 100 MG/1
100 TABLET, FILM COATED ORAL 2 TIMES DAILY
COMMUNITY

## 2023-05-30 RX ORDER — FLUTICASONE PROPIONATE AND SALMETEROL 250; 50 UG/1; UG/1
1 POWDER RESPIRATORY (INHALATION)
COMMUNITY

## 2023-05-30 RX ORDER — AMLODIPINE BESYLATE 10 MG/1
10 TABLET ORAL DAILY
COMMUNITY

## 2023-05-30 RX ORDER — ATENOLOL 25 MG/1
25 TABLET ORAL
Status: DISCONTINUED | OUTPATIENT
Start: 2023-05-30 | End: 2023-06-01 | Stop reason: HOSPADM

## 2023-05-30 RX ORDER — ACETAMINOPHEN 325 MG/1
650 TABLET ORAL EVERY 6 HOURS PRN
Status: DISCONTINUED | OUTPATIENT
Start: 2023-05-30 | End: 2023-05-31 | Stop reason: SDUPTHER

## 2023-05-30 RX ORDER — HYDRALAZINE HYDROCHLORIDE 50 MG/1
100 TABLET, FILM COATED ORAL 2 TIMES DAILY
Status: DISCONTINUED | OUTPATIENT
Start: 2023-05-30 | End: 2023-06-01 | Stop reason: HOSPADM

## 2023-05-30 RX ORDER — ALLOPURINOL 100 MG/1
100 TABLET ORAL DAILY
COMMUNITY

## 2023-05-30 RX ORDER — CALCITRIOL 0.25 UG/1
0.25 CAPSULE, LIQUID FILLED ORAL DAILY
Status: DISCONTINUED | OUTPATIENT
Start: 2023-05-30 | End: 2023-06-01 | Stop reason: HOSPADM

## 2023-05-30 RX ORDER — LANOLIN ALCOHOL/MO/W.PET/CERES
1000 CREAM (GRAM) TOPICAL DAILY
COMMUNITY

## 2023-05-30 RX ORDER — TAMSULOSIN HYDROCHLORIDE 0.4 MG/1
0.4 CAPSULE ORAL DAILY
Status: DISCONTINUED | OUTPATIENT
Start: 2023-05-30 | End: 2023-06-01 | Stop reason: HOSPADM

## 2023-05-30 RX ORDER — ALBUTEROL SULFATE 90 UG/1
2 AEROSOL, METERED RESPIRATORY (INHALATION)
COMMUNITY

## 2023-05-30 RX ORDER — NITROGLYCERIN 0.4 MG/1
0.4 TABLET SUBLINGUAL
Status: DISCONTINUED | OUTPATIENT
Start: 2023-05-30 | End: 2023-05-31 | Stop reason: SDUPTHER

## 2023-05-30 RX ORDER — BUDESONIDE AND FORMOTEROL FUMARATE DIHYDRATE 160; 4.5 UG/1; UG/1
2 AEROSOL RESPIRATORY (INHALATION)
Status: DISCONTINUED | OUTPATIENT
Start: 2023-05-30 | End: 2023-06-01 | Stop reason: HOSPADM

## 2023-05-30 RX ORDER — ATORVASTATIN CALCIUM 40 MG/1
40 TABLET, FILM COATED ORAL NIGHTLY
Status: DISCONTINUED | OUTPATIENT
Start: 2023-05-30 | End: 2023-06-01 | Stop reason: HOSPADM

## 2023-05-30 RX ORDER — DIPHENHYDRAMINE HCL 25 MG
50 CAPSULE ORAL NIGHTLY PRN
Status: DISCONTINUED | OUTPATIENT
Start: 2023-05-30 | End: 2023-06-01 | Stop reason: HOSPADM

## 2023-05-30 RX ORDER — FUROSEMIDE 20 MG/1
10 TABLET ORAL DAILY
COMMUNITY
End: 2023-06-01 | Stop reason: HOSPADM

## 2023-05-30 RX ORDER — ALBUTEROL SULFATE 2.5 MG/3ML
2.5 SOLUTION RESPIRATORY (INHALATION) EVERY 6 HOURS PRN
Status: DISCONTINUED | OUTPATIENT
Start: 2023-05-30 | End: 2023-06-01 | Stop reason: HOSPADM

## 2023-05-30 RX ORDER — PANTOPRAZOLE SODIUM 40 MG/1
40 TABLET, DELAYED RELEASE ORAL 2 TIMES DAILY
Status: DISCONTINUED | OUTPATIENT
Start: 2023-05-30 | End: 2023-06-01 | Stop reason: HOSPADM

## 2023-05-30 RX ORDER — SODIUM CHLORIDE 0.9 % (FLUSH) 0.9 %
10 SYRINGE (ML) INJECTION AS NEEDED
Status: DISCONTINUED | OUTPATIENT
Start: 2023-05-30 | End: 2023-06-01 | Stop reason: HOSPADM

## 2023-05-30 RX ORDER — MORPHINE SULFATE 2 MG/ML
2 INJECTION, SOLUTION INTRAMUSCULAR; INTRAVENOUS ONCE
Status: COMPLETED | OUTPATIENT
Start: 2023-05-30 | End: 2023-05-30

## 2023-05-30 RX ORDER — ASPIRIN 81 MG/1
81 TABLET ORAL DAILY
Status: DISCONTINUED | OUTPATIENT
Start: 2023-05-30 | End: 2023-06-01 | Stop reason: HOSPADM

## 2023-05-30 RX ORDER — LOSARTAN POTASSIUM 50 MG/1
50 TABLET ORAL 2 TIMES DAILY
Status: DISCONTINUED | OUTPATIENT
Start: 2023-05-30 | End: 2023-06-01 | Stop reason: HOSPADM

## 2023-05-30 RX ORDER — AMLODIPINE BESYLATE 10 MG/1
10 TABLET ORAL DAILY
Status: DISCONTINUED | OUTPATIENT
Start: 2023-05-30 | End: 2023-06-01 | Stop reason: HOSPADM

## 2023-05-30 RX ORDER — ALBUTEROL SULFATE 90 UG/1
2 AEROSOL, METERED RESPIRATORY (INHALATION) EVERY 6 HOURS PRN
Status: DISCONTINUED | OUTPATIENT
Start: 2023-05-30 | End: 2023-05-30 | Stop reason: SDUPTHER

## 2023-05-30 RX ORDER — ENOXAPARIN SODIUM 100 MG/ML
1 INJECTION SUBCUTANEOUS EVERY 12 HOURS SCHEDULED
Status: DISCONTINUED | OUTPATIENT
Start: 2023-05-30 | End: 2023-06-01 | Stop reason: HOSPADM

## 2023-05-30 RX ORDER — DOBUTAMINE HYDROCHLORIDE 100 MG/100ML
10-50 INJECTION INTRAVENOUS CONTINUOUS
Status: DISCONTINUED | OUTPATIENT
Start: 2023-05-30 | End: 2023-05-30

## 2023-05-30 RX ORDER — LEVOTHYROXINE SODIUM 112 UG/1
112 TABLET ORAL
Status: DISCONTINUED | OUTPATIENT
Start: 2023-05-30 | End: 2023-06-01 | Stop reason: HOSPADM

## 2023-05-30 RX ORDER — ALLOPURINOL 100 MG/1
100 TABLET ORAL DAILY
Status: DISCONTINUED | OUTPATIENT
Start: 2023-05-30 | End: 2023-06-01 | Stop reason: HOSPADM

## 2023-05-30 RX ORDER — ONDANSETRON 2 MG/ML
4 INJECTION INTRAMUSCULAR; INTRAVENOUS ONCE
Status: COMPLETED | OUTPATIENT
Start: 2023-05-30 | End: 2023-05-30

## 2023-05-30 RX ORDER — DIPHENHYDRAMINE HCL 50 MG
50 CAPSULE ORAL NIGHTLY PRN
COMMUNITY

## 2023-05-30 RX ORDER — CHLORAL HYDRATE 500 MG
1000 CAPSULE ORAL
COMMUNITY

## 2023-05-30 RX ORDER — LORATADINE 10 MG/1
10 TABLET ORAL DAILY PRN
COMMUNITY

## 2023-05-30 RX ORDER — CALCITRIOL 0.25 UG/1
0.25 CAPSULE, LIQUID FILLED ORAL DAILY
COMMUNITY

## 2023-05-30 RX ADMIN — ENOXAPARIN SODIUM 80 MG: 100 INJECTION SUBCUTANEOUS at 21:42

## 2023-05-30 RX ADMIN — ONDANSETRON 4 MG: 2 INJECTION INTRAMUSCULAR; INTRAVENOUS at 09:04

## 2023-05-30 RX ADMIN — BUDESONIDE AND FORMOTEROL FUMARATE DIHYDRATE 2 PUFF: 160; 4.5 AEROSOL RESPIRATORY (INHALATION) at 20:38

## 2023-05-30 RX ADMIN — TAMSULOSIN HYDROCHLORIDE 0.4 MG: 0.4 CAPSULE ORAL at 21:42

## 2023-05-30 RX ADMIN — LOSARTAN POTASSIUM 50 MG: 50 TABLET, FILM COATED ORAL at 21:43

## 2023-05-30 RX ADMIN — HYDRALAZINE HYDROCHLORIDE 100 MG: 50 TABLET ORAL at 20:39

## 2023-05-30 RX ADMIN — MORPHINE SULFATE 2 MG: 2 INJECTION, SOLUTION INTRAMUSCULAR; INTRAVENOUS at 09:05

## 2023-05-30 RX ADMIN — ATROPINE SULFATE 0.3 MG: 0.1 INJECTION INTRAVENOUS at 13:46

## 2023-05-30 RX ADMIN — ALLOPURINOL 100 MG: 100 TABLET ORAL at 21:42

## 2023-05-30 RX ADMIN — PERFLUTREN 8.48 MG: 6.52 INJECTION, SUSPENSION INTRAVENOUS at 13:28

## 2023-05-30 RX ADMIN — PANTOPRAZOLE SODIUM 40 MG: 40 TABLET, DELAYED RELEASE ORAL at 20:39

## 2023-05-30 RX ADMIN — CALCITRIOL 0.25 MCG: 0.25 CAPSULE ORAL at 21:42

## 2023-05-30 RX ADMIN — IOPAMIDOL 100 ML: 755 INJECTION, SOLUTION INTRAVENOUS at 10:28

## 2023-05-30 RX ADMIN — METOPROLOL TARTRATE 5 MG: 5 INJECTION INTRAVENOUS at 13:47

## 2023-05-30 RX ADMIN — DOBUTAMINE 10 MCG/KG/MIN: 12.5 INJECTION, SOLUTION, CONCENTRATE INTRAVENOUS at 13:28

## 2023-05-30 RX ADMIN — CITALOPRAM 40 MG: 20 TABLET, FILM COATED ORAL at 21:42

## 2023-05-30 RX ADMIN — ATORVASTATIN CALCIUM 40 MG: 40 TABLET, FILM COATED ORAL at 20:39

## 2023-05-30 NOTE — ED PROVIDER NOTES
Subjective   History of Present Illness  Patient is an 82-year-old male with a history of coronary artery disease, diabetes and hypertension who presents to the ER with chest pain.  Patient states he had the onset of midsternal chest pain that started around 12 PM yesterday.  Patient describes it as a constant sharp and achy pain that radiates to his neck and shoulders.  Patient states the pain has been progressively worsening.  He states the pain does worsen with exertion.  He does have associated shortness of air and a chronic cough.  Patient states he also has peripheral neuropathy in his lower extremities and that is bothering him as well.  He denies any fever, abdominal pain, nausea vomiting diarrhea, urinary changes, focal neurologic changes.    Review of Systems   Constitutional: Negative.    HENT: Negative.     Eyes: Negative.    Respiratory:  Positive for cough and shortness of breath.    Cardiovascular:  Positive for chest pain.   Gastrointestinal: Negative.    Endocrine: Negative.    Genitourinary: Negative.    Musculoskeletal: Negative.    Skin: Negative.    Allergic/Immunologic: Negative.    Neurological: Negative.    Hematological: Negative.    Psychiatric/Behavioral: Negative.     All other systems reviewed and are negative.    Past Medical History:   Diagnosis Date    Coronary artery disease     Depression     Diabetes mellitus     Disease of thyroid gland     Elevated cholesterol     Hypertension        No Known Allergies    Past Surgical History:   Procedure Laterality Date    BACK SURGERY      CAROTID ARTERY - SUBCLAVIAN ARTERY BYPASS GRAFT      CHOLECYSTECTOMY      COLONOSCOPY  09/28/2012    Normal exam    COLONOSCOPY  02/26/2018    Normal exam repeat prn    ENDOSCOPY N/A 5/28/2019    A large amount of food residue in the stomach otherwise normal exam.    GALLBLADDER SURGERY         Family History   Problem Relation Age of Onset    No Known Problems Father     No Known Problems Mother      Diabetes Brother     Colon cancer Neg Hx     Colon polyps Neg Hx        Social History     Socioeconomic History    Marital status:    Tobacco Use    Smoking status: Every Day     Packs/day: 1.00     Types: Cigarettes    Smokeless tobacco: Never   Substance and Sexual Activity    Alcohol use: No    Drug use: Defer    Sexual activity: Defer           Objective   Physical Exam  Vitals and nursing note reviewed.   Constitutional:       Appearance: He is well-developed.   HENT:      Head: Normocephalic and atraumatic.   Eyes:      Conjunctiva/sclera: Conjunctivae normal.      Pupils: Pupils are equal, round, and reactive to light.   Cardiovascular:      Rate and Rhythm: Normal rate and regular rhythm.      Heart sounds: Normal heart sounds.   Pulmonary:      Effort: Pulmonary effort is normal.      Breath sounds: Normal breath sounds.   Abdominal:      Palpations: Abdomen is soft.      Tenderness: There is no abdominal tenderness.   Musculoskeletal:         General: No deformity. Normal range of motion.      Cervical back: Normal range of motion.   Skin:     General: Skin is warm.   Neurological:      Mental Status: He is alert and oriented to person, place, and time.   Psychiatric:         Behavior: Behavior normal.       Procedures           ED Course      EKG as interpreted by me: Sinus rhythm with a rate of 62 with no acute ischemia or infarction  EKG 2 is interpreted by me: Junctional rhythm with a rate of 59, no acute ischemia or infarction       Lab Results (last 24 hours)       Procedure Component Value Units Date/Time    CBC & Differential [[id number]]  (Abnormal) Collected: 05/30/23 0921    Specimen: Blood Updated: 05/30/23 0934    Narrative:      The following orders were created for panel order CBC & Differential.  Procedure                               Abnormality         Status                     ---------                               -----------         ------                     CBC Auto  Differential[[id number]]        Abnormal            Final result                 Please view results for these tests on the individual orders.    Comprehensive Metabolic Panel [[id number]]  (Abnormal) Collected: 05/30/23 0921    Specimen: Blood Updated: 05/30/23 1003     Glucose 122 mg/dL      BUN 27 mg/dL      Creatinine 1.18 mg/dL      Sodium 140 mmol/L      Potassium 4.4 mmol/L      Chloride 105 mmol/L      CO2 25.0 mmol/L      Calcium 9.1 mg/dL      Total Protein 7.1 g/dL      Albumin 3.4 g/dL      ALT (SGPT) 17 U/L      AST (SGOT) 29 U/L      Alkaline Phosphatase 63 U/L      Total Bilirubin 0.8 mg/dL      Globulin 3.7 gm/dL      A/G Ratio 0.9 g/dL      BUN/Creatinine Ratio 22.9     Anion Gap 10.0 mmol/L      eGFR 61.6 mL/min/1.73     Narrative:      GFR Normal >60  Chronic Kidney Disease <60  Kidney Failure <15    The GFR formula is only valid for adults with stable renal function between ages 18 and 70.    High Sensitivity Troponin T [[id number]]  (Abnormal) Collected: 05/30/23 0921    Specimen: Blood Updated: 05/30/23 0957     HS Troponin T 40 ng/L     Narrative:      High Sensitive Troponin T Reference Range:  <10.0 ng/L- Negative Female for AMI  <15.0 ng/L- Negative Male for AMI  >=10 - Abnormal Female indicating possible myocardial injury.  >=15 - Abnormal Male indicating possible myocardial injury.   Clinicians would have to utilize clinical acumen, EKG, Troponin, and serial changes to determine if it is an Acute Myocardial Infarction or myocardial injury due to an underlying chronic condition.         CBC Auto Differential [[id number]]  (Abnormal) Collected: 05/30/23 0921    Specimen: Blood Updated: 05/30/23 0934     WBC 10.08 10*3/mm3      RBC 3.12 10*6/mm3      Hemoglobin 9.6 g/dL      Hematocrit 30.6 %      MCV 98.1 fL      MCH 30.8 pg      MCHC 31.4 g/dL      RDW 16.0 %      RDW-SD 57.1 fl      MPV 11.0 fL      Platelets 198 10*3/mm3      Neutrophil % 75.6 %      Lymphocyte % 9.6 %      Monocyte % 13.3 %       Eosinophil % 0.8 %      Basophil % 0.4 %      Immature Grans % 0.3 %      Neutrophils, Absolute 7.62 10*3/mm3      Lymphocytes, Absolute 0.97 10*3/mm3      Monocytes, Absolute 1.34 10*3/mm3      Eosinophils, Absolute 0.08 10*3/mm3      Basophils, Absolute 0.04 10*3/mm3      Immature Grans, Absolute 0.03 10*3/mm3      nRBC 0.0 /100 WBC     High Sensitivity Troponin T 2Hr [[id number]]  (Abnormal) Collected: 05/30/23 1112    Specimen: Blood Updated: 05/30/23 1139     HS Troponin T 39 ng/L      Troponin T Delta -1 ng/L     Narrative:      High Sensitive Troponin T Reference Range:  <10.0 ng/L- Negative Female for AMI  <15.0 ng/L- Negative Male for AMI  >=10 - Abnormal Female indicating possible myocardial injury.  >=15 - Abnormal Male indicating possible myocardial injury.   Clinicians would have to utilize clinical acumen, EKG, Troponin, and serial changes to determine if it is an Acute Myocardial Infarction or myocardial injury due to an underlying chronic condition.                CT Angiogram Chest   Final Result   1. Moderate atherosclerosis with moderate diffuse coronary   calcification. No thoracic aneurysm or dissection.   2. No pulmonary emboli.   3. Mild cardiomegaly.   4. Trace left pleural effusion. Mild left basilar atelectasis.   5. Mild peripheral emphysematous changes. Nonspecific hilar lymph nodes   measuring up to 11 to 12 mm, likely reactive.   This report was finalized on 05/30/2023 10:38 by Dr. Olamide Joya MD.      XR Chest 1 View   Final Result   1.. No acute disease.   This report was finalized on 05/30/2023 09:04 by Dr. Faizan Mccullough MD.                                       Medical Decision Making  Patient is an 82-year-old male with a history of coronary artery disease, diabetes and hypertension who presents to the ER with chest pain.  Patient states he had the onset of midsternal chest pain that started around 12 PM yesterday.  Patient describes it as a constant sharp and achy  pain that radiates to his neck and shoulders.  Patient states the pain has been progressively worsening.  He states the pain does worsen with exertion.  He does have associated shortness of air and a chronic cough.  Patient states he also has peripheral neuropathy in his lower extremities and that is bothering him as well.  He denies any fever, abdominal pain, nausea vomiting diarrhea, urinary changes, focal neurologic changes.    Differential diagnosis: Acute coronary syndrome, pulmonary embolus, aortic dissection, pleurisy    Patient had already been given full dose aspirin by EMS.  Patient was given nitroglycerin, morphine and Zofran.  Pain did improve with nitroglycerin.  Labs showed mild anemia.  Initial troponin was elevated.  Repeat troponin had a delta of -1.  Chest x-ray was unremarkable.  CTA of the chest showed moderate atherosclerosis with moderate diffuse coronary calcification.  There is no dissection or aneurysm or pulmonary emboli.  Patient did have mild cardiomegaly with trace left pleural effusion and mild left basilar atelectasis.  Patient have some mild emphysematous changes with nonspecific hilar lymph nodes.  A stress test was then performed on the patient and was abnormal.  I discussed the case with Dr. Zacarias with cardiology.  He has admitted the patient to his service for further evaluation and work-up.    HEART Score for Major Cardiac Events - MDCalc  Calculated on May 30 2023 5:10 PM  8 points -> High Score (7-10 points) Risk of MACE of 50-65%.      Problems Addressed:  Abnormal stress test: complicated acute illness or injury  Atherosclerosis of coronary artery of native heart with angina pectoris, unspecified vessel or lesion type: complicated acute illness or injury    Amount and/or Complexity of Data Reviewed  Labs: ordered. Decision-making details documented in ED Course.  Radiology: ordered. Decision-making details documented in ED Course.  ECG/medicine tests: ordered. Decision-making  details documented in ED Course.  Discussion of management or test interpretation with external provider(s): Dr Zacarias with cardiology    Risk  Prescription drug management.  Decision regarding hospitalization.        Final diagnoses:   Atherosclerosis of coronary artery of native heart with angina pectoris, unspecified vessel or lesion type   Abnormal stress test       ED Disposition  ED Disposition       ED Disposition   Decision to Admit    Condition   --    Comment   Level of Care: Telemetry [5]   Diagnosis: Atherosclerosis of coronary artery of native heart with angina pectoris, unspecified vessel or lesion type [[id number]]   Admitting Physician: SANKET ZACARIAS [[id number]]   Attending Physician: BIBIANA ZACARIAS [[id number]]   Certification: I Certify That Inpatient Hospital Services Are Medically Necessary For Greater Than 2 Midnights                 No follow-up provider specified.       Medication List      No changes were made to your prescriptions during this visit.            Bibiana Zacarias MD  05/30/23 2954

## 2023-05-30 NOTE — H&P
[facility] HEART GROUP HISTORY AND PHYSICAL      Patient Care Team:  Jcarlos Ro PA as PCP - Ze Mix MD (Inactive) as Consulting Physician (Urology)    Chief complaint : chest pain     Subjective     Rajinder Riggs is a 82 y.o. male presents with chest pain.  He tells me this was nonexertional and started yesterday around noon.  He describes this as a congestion in his chest that radiated downward to his ribs and upward into his neck and then to both shoulders.  He states this occurred off and on all night long, until he decided to seek attention earlier this morning.  Was flown in by helicopter.  He states he received full dose aspirin and nitroglycerin in route to ER.  He states the nitroglycerin eased his chest discomfort approximately 80%, and this later completely resolved on its own.  He denies any other similar episodes of chest discomfort.  He is currently chest pain-free but tells me he continues to have some of his usual chronic bilateral shoulder pain.  CTA of the chest was negative for acute findings.  Troponins were elevated but flat trending at 40 and 39.  EKGs revealed normal sinus rhythm, without acute ischemic changes.    In the ER he received Lopressor IV, morphine, Zofran.  He underwent a dobutamine stress echocardiogram which was abnormal.  See below.    He does have hypertension, hyperlipidemia, carotid artery disease, diabetes mellitus type 2, and is a smoker.  Reports prior vascular surgery on his carotids, but denies ever having coronary intervention.    He believes he may have had some stress testing through the [place], [place] in the past.    Tells me he has labile blood pressures-sometimes normal and sometimes elevated.  He reports he is compliant with his medications and his blood pressures have not been any different than usual as of late.    Review of Systems  Review of Systems   Constitutional: Negative for malaise/fatigue.    Cardiovascular:  Positive for chest pain. Negative for claudication, dyspnea on exertion, leg swelling, near-syncope, orthopnea, palpitations, paroxysmal nocturnal dyspnea and syncope.   Respiratory:  Negative for cough and shortness of breath.    Hematologic/Lymphatic: Does not bruise/bleed easily.   Musculoskeletal:  Negative for falls.   Gastrointestinal:  Negative for bloating.   Neurological:  Negative for dizziness, light-headedness and weakness.      History  Past Medical History:   Diagnosis Date    Coronary artery disease     Depression     Diabetes mellitus     Disease of thyroid gland     Elevated cholesterol     Hypertension      Past Surgical History:   Procedure Laterality Date    BACK SURGERY      CAROTID ARTERY - SUBCLAVIAN ARTERY BYPASS GRAFT      CHOLECYSTECTOMY      COLONOSCOPY  09/28/2012    Normal exam    COLONOSCOPY  02/26/2018    Normal exam repeat prn    ENDOSCOPY N/A 5/28/2019    A large amount of food residue in the stomach otherwise normal exam.    GALLBLADDER SURGERY       Family History   Problem Relation Age of Onset    No Known Problems Father     No Known Problems Mother     Diabetes Brother     Colon cancer Neg Hx     Colon polyps Neg Hx      Social History     Tobacco Use    Smoking status: Every Day     Packs/day: 1.00     Types: Cigarettes    Smokeless tobacco: Never   Substance Use Topics    Alcohol use: No    Drug use: Defer       Medications  Prior to Admission medications    Medication Sig Start Date End Date Taking? Authorizing Provider   aspirin 81 MG EC tablet Take 81 mg by mouth Daily.    Argenis Blackmon MD   atenolol (TENORMIN) 50 MG tablet Take 50 mg by mouth Daily.    ProviderArgenis MD   bismuth subsalicylate (PEPTO BISMOL) 262 MG/15ML suspension Take 30 mL by mouth Daily.    Argenis Blackmon MD   citalopram (CeleXA) 40 MG tablet Take 40 mg by mouth Daily.    Argenis Blackmon MD   colesevelam (WELCHOL) 625 MG tablet Take 1 tablet by mouth 2  (Two) Times a Day With Meals. 6/12/19   Olamide Gonzalez APRN   hydrochlorothiazide (HYDRODIURIL) 25 MG tablet Take 25 mg by mouth Daily.    Argenis Blackmon MD   ibuprofen (ADVIL,MOTRIN) 200 MG tablet Take 200 mg by mouth Every 8 (Eight) Hours As Needed for Mild Pain .    Argenis Blackmon MD   Lactobacillus (PROBIOTIC ACIDOPHILUS PO) Take 2 capsules by mouth 2 (Two) Times a Day.    Argenis Blackmon MD   levothyroxine (SYNTHROID, LEVOTHROID) 125 MCG tablet Take 125 mcg by mouth Daily.    Argenis Blackmon MD   losartan (COZAAR) 50 MG tablet Take 50 mg by mouth Daily.    Argenis Blackmon MD   METFORMIN HCL PO Take 1 tablet by mouth Daily.    Argenis Blackmon MD   pantoprazole (PROTONIX) 40 MG EC tablet Take 40 mg by mouth Daily.    Argenis Blackmon MD   simvastatin (ZOCOR) 40 MG tablet Take 40 mg by mouth Every Night.    Argenis Blackmon MD   tamsulosin (FLOMAX) 0.4 MG capsule 24 hr capsule Take 1 capsule by mouth Daily. 1/20/17   Ze Clay MD       Current Facility-Administered Medications   Medication Dose Route Frequency Provider Last Rate Last Admin    nitroglycerin (NITROSTAT) SL tablet 0.4 mg  0.4 mg Sublingual Q5 Min PRN Janelle Zacarias MD        sodium chloride 0.9 % flush 10 mL  10 mL Intravenous PRN Janelle Zacarias MD         Current Outpatient Medications   Medication Sig Dispense Refill    aspirin 81 MG EC tablet Take 81 mg by mouth Daily.      atenolol (TENORMIN) 50 MG tablet Take 50 mg by mouth Daily.      bismuth subsalicylate (PEPTO BISMOL) 262 MG/15ML suspension Take 30 mL by mouth Daily.      citalopram (CeleXA) 40 MG tablet Take 40 mg by mouth Daily.      colesevelam (WELCHOL) 625 MG tablet Take 1 tablet by mouth 2 (Two) Times a Day With Meals. 180 tablet 4    hydrochlorothiazide (HYDRODIURIL) 25 MG tablet Take 25 mg by mouth Daily.      ibuprofen (ADVIL,MOTRIN) 200 MG tablet Take 200 mg by mouth Every 8 (Eight) Hours As Needed for Mild  Pain .      Lactobacillus (PROBIOTIC ACIDOPHILUS PO) Take 2 capsules by mouth 2 (Two) Times a Day.      levothyroxine (SYNTHROID, LEVOTHROID) 125 MCG tablet Take 125 mcg by mouth Daily.      losartan (COZAAR) 50 MG tablet Take 50 mg by mouth Daily.      METFORMIN HCL PO Take 1 tablet by mouth Daily.      pantoprazole (PROTONIX) 40 MG EC tablet Take 40 mg by mouth Daily.      simvastatin (ZOCOR) 40 MG tablet Take 40 mg by mouth Every Night.      tamsulosin (FLOMAX) 0.4 MG capsule 24 hr capsule Take 1 capsule by mouth Daily. 30 capsule 11        Allergies:  Patient has no known allergies.    Objective     Vital Signs  Temp:  [98.2 °F (36.8 °C)] 98.2 °F (36.8 °C)  Heart Rate:  [53-66] 59  Resp:  [16] 16  BP: (155-194)/(45-85) 194/55    Labs  Lab Results (last 72 hours)       Procedure Component Value Units Date/Time    High Sensitivity Troponin T 2Hr [[id number]]  (Abnormal) Collected: 05/30/23 1112    Specimen: Blood Updated: 05/30/23 1139     HS Troponin T 39 ng/L      Troponin T Delta -1 ng/L     Narrative:      High Sensitive Troponin T Reference Range:  <10.0 ng/L- Negative Female for AMI  <15.0 ng/L- Negative Male for AMI  >=10 - Abnormal Female indicating possible myocardial injury.  >=15 - Abnormal Male indicating possible myocardial injury.   Clinicians would have to utilize clinical acumen, EKG, Troponin, and serial changes to determine if it is an Acute Myocardial Infarction or myocardial injury due to an underlying chronic condition.         [facility] [[id number]] Collected: 05/30/23 0921    Specimen: Blood Updated: 05/30/23 1031    Narrative:      The following orders were created for panel order [facility].  Procedure                               Abnormality         Status                     ---------                               -----------         ------                     Green Top (Gel)[[id number]]                                  Final result               Lavender Top[[id number]]                                      Final result               Red Top[[id number]]                                          Final result               Light Blue Top[[id number]]                                   Final result                 Please view results for these tests on the individual orders.    Lavender Top [[id number]] Collected: 05/30/23 0921    Specimen: Blood Updated: 05/30/23 1031     Extra Tube hold for add-on     Comment: Auto resulted       Red Top [[id number]] Collected: 05/30/23 0921    Specimen: Blood Updated: 05/30/23 1031     Extra Tube Hold for add-ons.     Comment: Auto resulted.       Green Top (Gel) [[id number]] Collected: 05/30/23 0921    Specimen: Blood Updated: 05/30/23 1031     Extra Tube Hold for add-ons.     Comment: Auto resulted.       Light Blue Top [[id number]] Collected: 05/30/23 0921    Specimen: Blood Updated: 05/30/23 1031     Extra Tube Hold for add-ons.     Comment: Auto resulted       Comprehensive Metabolic Panel [[id number]]  (Abnormal) Collected: 05/30/23 0921    Specimen: Blood Updated: 05/30/23 1003     Glucose 122 mg/dL      BUN 27 mg/dL      Creatinine 1.18 mg/dL      Sodium 140 mmol/L      Potassium 4.4 mmol/L      Chloride 105 mmol/L      CO2 25.0 mmol/L      Calcium 9.1 mg/dL      Total Protein 7.1 g/dL      Albumin 3.4 g/dL      ALT (SGPT) 17 U/L      AST (SGOT) 29 U/L      Alkaline Phosphatase 63 U/L      Total Bilirubin 0.8 mg/dL      Globulin 3.7 gm/dL      A/G Ratio 0.9 g/dL      BUN/Creatinine Ratio 22.9     Anion Gap 10.0 mmol/L      eGFR 61.6 mL/min/1.73     Narrative:      GFR Normal >60  Chronic Kidney Disease <60  Kidney Failure <15    The GFR formula is only valid for adults with stable renal function between ages 18 and 70.    High Sensitivity Troponin T [[id number]]  (Abnormal) Collected: 05/30/23 0921    Specimen: Blood Updated: 05/30/23 0957     HS Troponin T 40 ng/L     Narrative:      High Sensitive Troponin T Reference Range:  <10.0 ng/L- Negative Female for  AMI  <15.0 ng/L- Negative Male for AMI  >=10 - Abnormal Female indicating possible myocardial injury.  >=15 - Abnormal Male indicating possible myocardial injury.   Clinicians would have to utilize clinical acumen, EKG, Troponin, and serial changes to determine if it is an Acute Myocardial Infarction or myocardial injury due to an underlying chronic condition.         CBC & Differential [[id number]]  (Abnormal) Collected: 05/30/23 0921    Specimen: Blood Updated: 05/30/23 0934    Narrative:      The following orders were created for panel order CBC & Differential.  Procedure                               Abnormality         Status                     ---------                               -----------         ------                     CBC Auto Differential[[id number]]        Abnormal            Final result                 Please view results for these tests on the individual orders.    CBC Auto Differential [[id number]]  (Abnormal) Collected: 05/30/23 0921    Specimen: Blood Updated: 05/30/23 0934     WBC 10.08 10*3/mm3      RBC 3.12 10*6/mm3      Hemoglobin 9.6 g/dL      Hematocrit 30.6 %      MCV 98.1 fL      MCH 30.8 pg      MCHC 31.4 g/dL      RDW 16.0 %      RDW-SD 57.1 fl      MPV 11.0 fL      Platelets 198 10*3/mm3      Neutrophil % 75.6 %      Lymphocyte % 9.6 %      Monocyte % 13.3 %      Eosinophil % 0.8 %      Basophil % 0.4 %      Immature Grans % 0.3 %      Neutrophils, Absolute 7.62 10*3/mm3      Lymphocytes, Absolute 0.97 10*3/mm3      Monocytes, Absolute 1.34 10*3/mm3      Eosinophils, Absolute 0.08 10*3/mm3      Basophils, Absolute 0.04 10*3/mm3      Immature Grans, Absolute 0.03 10*3/mm3      nRBC 0.0 /100 WBC             Physical Exam:  Vitals and nursing note reviewed.   Constitutional:       General: Not in acute distress.     Appearance: Well-developed and not in distress. Not diaphoretic.   Neck:      Vascular: No JVD.   Pulmonary:      Effort: Pulmonary effort is normal. No respiratory  distress.      Comments: Scant coarse lung sounds bases   Cardiovascular:      Normal rate. Regular rhythm.      Murmurs: There is no murmur.   Edema:     Peripheral edema absent.   Abdominal:      Tenderness: There is no abdominal tenderness.   Skin:     General: Skin is warm and dry.   Neurological:      Mental Status: Alert and oriented to person, place, and time.     DSE today :      High risk stress test.    Multiple wall segments, as outlined below, appear hypokinetic to akinetic in response to dobutamine infusion.    The following left ventricular wall segments are hypokinetic: apical anterior and mid anterolateral. The following left ventricular wall segments are akinetic: apical lateral and apex.     No results found for: CHOL, CHLPL, TRIG, HDL, LDL, LDLDIRECT     No results found for: HGBA1C     Results Review:    I reviewed the patient's new clinical results.  I personally viewed and interpreted the patient's EKG/Telemetry data    Assessment & Plan     Chest pain: Concerning for possible unstable angina, with flat trending troponin and high risk dobutamine stress echocardiogram.  At the time of my exam, he is pain-free and hemodynamically stable.  -Admit to  telemetry  -He received full dose aspirin per EMS  -Continue aspirin 81 mg daily  -Initiate high intensity statin  -2D echocardiogram  -Lovenox 1 mg/kilogram every 12 hours  -N.p.o. after midnight for cardiac catheterization tomorrow with Dr. Zacarias.  The procedure and its risks, benefits and alternatives have been discussed with the patient and he is agreeable to proceed.    2.  Hypertension: He is hypertensive this evening, though he has not had his usual home medication.  I have resumed his atenolol, losartan, amlodipine and hydralazine at home doses.    3.  Hyperlipidemia: Check lipid panel in a.m.  High intensity statin.    4.  Diabetes mellitus type 2: On metformin at home.  Currently held.  Check A1c in a.m.    5.  Tobacco abuse: Counseled on  the importance of cessation    6.  Carotid artery disease    7.  Anemia: Stable.  No signs of bleeding.      I discussed the patient's findings and my recommendations with patient.     Electronically signed by BRIANA Man, 05/30/23, 4:28 PM CDT.

## 2023-05-30 NOTE — Clinical Note
Hemostasis started on the right radial artery. R-Band was used in achieving hemostasis. Radial compression device applied to vessel. Hemostasis achieved successfully. Closure device additional comment: 15 ml

## 2023-05-31 ENCOUNTER — TELEPHONE (OUTPATIENT)
Dept: CARDIAC SURGERY | Facility: CLINIC | Age: 83
End: 2023-05-31
Payer: MEDICARE

## 2023-05-31 ENCOUNTER — APPOINTMENT (OUTPATIENT)
Dept: CARDIOLOGY | Facility: HOSPITAL | Age: 83
DRG: 287 | End: 2023-05-31
Payer: MEDICARE

## 2023-05-31 PROBLEM — R94.39 ABNORMAL STRESS TEST: Status: ACTIVE | Noted: 2023-05-30

## 2023-05-31 PROBLEM — I20.0 UNSTABLE ANGINA PECTORIS: Status: ACTIVE | Noted: 2023-05-30

## 2023-05-31 LAB
ANION GAP SERPL CALCULATED.3IONS-SCNC: 8 MMOL/L (ref 5–15)
AORTIC DIMENSIONLESS INDEX: 0.44 (DI)
BASOPHILS # BLD AUTO: 0.04 10*3/MM3 (ref 0–0.2)
BASOPHILS NFR BLD AUTO: 0.4 % (ref 0–1.5)
BH CV ECHO MEAS - AI P1/2T: 486 MSEC
BH CV ECHO MEAS - AO MAX PG: 21.3 MMHG
BH CV ECHO MEAS - AO MEAN PG: 10 MMHG
BH CV ECHO MEAS - AO V2 MAX: 231 CM/SEC
BH CV ECHO MEAS - AO V2 VTI: 54.4 CM
BH CV ECHO MEAS - AVA(I,D): 2.38 CM2
BH CV ECHO MEAS - EDV(CUBED): 148.9 ML
BH CV ECHO MEAS - EDV(MOD-SP2): 92 ML
BH CV ECHO MEAS - EDV(MOD-SP4): 140 ML
BH CV ECHO MEAS - EF(MOD-SP4): 72.1 %
BH CV ECHO MEAS - ESV(CUBED): 27 ML
BH CV ECHO MEAS - ESV(MOD-SP4): 39 ML
BH CV ECHO MEAS - FS: 43.4 %
BH CV ECHO MEAS - IVS/LVPW: 1 CM
BH CV ECHO MEAS - IVSD: 1 CM
BH CV ECHO MEAS - LA DIMENSION: 4.4 CM
BH CV ECHO MEAS - LV DIASTOLIC VOL/BSA (35-75): 71.7 CM2
BH CV ECHO MEAS - LV MASS(C)D: 200.4 GRAMS
BH CV ECHO MEAS - LV MAX PG: 4.9 MMHG
BH CV ECHO MEAS - LV MEAN PG: 3 MMHG
BH CV ECHO MEAS - LV SYSTOLIC VOL/BSA (12-30): 20 CM2
BH CV ECHO MEAS - LV V1 MAX: 111 CM/SEC
BH CV ECHO MEAS - LV V1 VTI: 31.2 CM
BH CV ECHO MEAS - LVIDD: 5.3 CM
BH CV ECHO MEAS - LVIDS: 3 CM
BH CV ECHO MEAS - LVOT AREA: 4.2 CM2
BH CV ECHO MEAS - LVOT DIAM: 2.3 CM
BH CV ECHO MEAS - LVPWD: 1 CM
BH CV ECHO MEAS - MR MAX PG: 126.1 MMHG
BH CV ECHO MEAS - MR MAX VEL: 561.5 CM/SEC
BH CV ECHO MEAS - MV A MAX VEL: 66.2 CM/SEC
BH CV ECHO MEAS - MV DEC SLOPE: 410 CM/SEC2
BH CV ECHO MEAS - MV E MAX VEL: 94.8 CM/SEC
BH CV ECHO MEAS - MV E/A: 1.43
BH CV ECHO MEAS - MV P1/2T: 80.7 MSEC
BH CV ECHO MEAS - MVA(P1/2T): 2.7 CM2
BH CV ECHO MEAS - PA V2 MAX: 97.7 CM/SEC
BH CV ECHO MEAS - RAP SYSTOLE: 5 MMHG
BH CV ECHO MEAS - RV MAX PG: 2.7 MMHG
BH CV ECHO MEAS - RV V1 MAX: 82.9 CM/SEC
BH CV ECHO MEAS - RVDD: 3.8 CM
BH CV ECHO MEAS - RVSP: 40.5 MMHG
BH CV ECHO MEAS - SI(MOD-SP4): 51.7 ML/M2
BH CV ECHO MEAS - SV(LVOT): 129.6 ML
BH CV ECHO MEAS - SV(MOD-SP4): 101 ML
BH CV ECHO MEAS - TR MAX PG: 35.5 MMHG
BH CV ECHO MEAS - TR MAX VEL: 298 CM/SEC
BUN SERPL-MCNC: 29 MG/DL (ref 8–23)
BUN/CREAT SERPL: 21.6 (ref 7–25)
CALCIUM SPEC-SCNC: 8.8 MG/DL (ref 8.6–10.5)
CHLORIDE SERPL-SCNC: 104 MMOL/L (ref 98–107)
CHOLEST SERPL-MCNC: 96 MG/DL (ref 0–200)
CO2 SERPL-SCNC: 25 MMOL/L (ref 22–29)
CREAT SERPL-MCNC: 1.34 MG/DL (ref 0.76–1.27)
DEPRECATED RDW RBC AUTO: 56.2 FL (ref 37–54)
EGFRCR SERPLBLD CKD-EPI 2021: 52.9 ML/MIN/1.73
EOSINOPHIL # BLD AUTO: 0.09 10*3/MM3 (ref 0–0.4)
EOSINOPHIL NFR BLD AUTO: 1 % (ref 0.3–6.2)
ERYTHROCYTE [DISTWIDTH] IN BLOOD BY AUTOMATED COUNT: 15.9 % (ref 12.3–15.4)
GLUCOSE SERPL-MCNC: 97 MG/DL (ref 65–99)
HBA1C MFR BLD: 5.2 % (ref 4.8–5.6)
HCT VFR BLD AUTO: 28.4 % (ref 37.5–51)
HDLC SERPL-MCNC: 30 MG/DL (ref 40–60)
HGB BLD-MCNC: 9.1 G/DL (ref 13–17.7)
IMM GRANULOCYTES # BLD AUTO: 0.03 10*3/MM3 (ref 0–0.05)
IMM GRANULOCYTES NFR BLD AUTO: 0.3 % (ref 0–0.5)
LDLC SERPL CALC-MCNC: 52 MG/DL (ref 0–100)
LDLC/HDLC SERPL: 1.79 {RATIO}
LEFT ATRIUM VOLUME INDEX: 53.3 ML/M2
LEFT ATRIUM VOLUME: 104 ML
LYMPHOCYTES # BLD AUTO: 1.74 10*3/MM3 (ref 0.7–3.1)
LYMPHOCYTES NFR BLD AUTO: 19.4 % (ref 19.6–45.3)
MCH RBC QN AUTO: 31.2 PG (ref 26.6–33)
MCHC RBC AUTO-ENTMCNC: 32 G/DL (ref 31.5–35.7)
MCV RBC AUTO: 97.3 FL (ref 79–97)
MONOCYTES # BLD AUTO: 1.21 10*3/MM3 (ref 0.1–0.9)
MONOCYTES NFR BLD AUTO: 13.5 % (ref 5–12)
NEUTROPHILS NFR BLD AUTO: 5.84 10*3/MM3 (ref 1.7–7)
NEUTROPHILS NFR BLD AUTO: 65.4 % (ref 42.7–76)
NRBC BLD AUTO-RTO: 0 /100 WBC (ref 0–0.2)
PLATELET # BLD AUTO: 186 10*3/MM3 (ref 140–450)
PMV BLD AUTO: 10.5 FL (ref 6–12)
POTASSIUM SERPL-SCNC: 3.9 MMOL/L (ref 3.5–5.2)
QT INTERVAL: 448 MS
QT INTERVAL: 456 MS
QTC INTERVAL: 451 MS
QTC INTERVAL: 454 MS
RBC # BLD AUTO: 2.92 10*6/MM3 (ref 4.14–5.8)
SODIUM SERPL-SCNC: 137 MMOL/L (ref 136–145)
TRIGL SERPL-MCNC: 61 MG/DL (ref 0–150)
VLDLC SERPL-MCNC: 14 MG/DL (ref 5–40)
WBC NRBC COR # BLD: 8.95 10*3/MM3 (ref 3.4–10.8)

## 2023-05-31 PROCEDURE — C1894 INTRO/SHEATH, NON-LASER: HCPCS | Performed by: INTERNAL MEDICINE

## 2023-05-31 PROCEDURE — 93306 TTE W/DOPPLER COMPLETE: CPT

## 2023-05-31 PROCEDURE — 25010000002 ENOXAPARIN PER 10 MG: Performed by: INTERNAL MEDICINE

## 2023-05-31 PROCEDURE — 25010000002 DIPHENHYDRAMINE PER 50 MG: Performed by: INTERNAL MEDICINE

## 2023-05-31 PROCEDURE — 25010000002 ENOXAPARIN PER 10 MG: Performed by: NURSE PRACTITIONER

## 2023-05-31 PROCEDURE — 93567 NJX CAR CTH SPRVLV AORTGRPHY: CPT | Performed by: INTERNAL MEDICINE

## 2023-05-31 PROCEDURE — 25010000002 FENTANYL CITRATE (PF) 50 MCG/ML SOLUTION: Performed by: INTERNAL MEDICINE

## 2023-05-31 PROCEDURE — 94664 DEMO&/EVAL PT USE INHALER: CPT

## 2023-05-31 PROCEDURE — 25510000001 IOPAMIDOL 61 % SOLUTION: Performed by: INTERNAL MEDICINE

## 2023-05-31 PROCEDURE — 94799 UNLISTED PULMONARY SVC/PX: CPT

## 2023-05-31 PROCEDURE — B3101ZZ FLUOROSCOPY OF THORACIC AORTA USING LOW OSMOLAR CONTRAST: ICD-10-PCS | Performed by: INTERNAL MEDICINE

## 2023-05-31 PROCEDURE — 25010000002 MIDAZOLAM PER 1 MG: Performed by: INTERNAL MEDICINE

## 2023-05-31 PROCEDURE — 93306 TTE W/DOPPLER COMPLETE: CPT | Performed by: INTERNAL MEDICINE

## 2023-05-31 PROCEDURE — 4A023N7 MEASUREMENT OF CARDIAC SAMPLING AND PRESSURE, LEFT HEART, PERCUTANEOUS APPROACH: ICD-10-PCS | Performed by: INTERNAL MEDICINE

## 2023-05-31 PROCEDURE — 99152 MOD SED SAME PHYS/QHP 5/>YRS: CPT | Performed by: INTERNAL MEDICINE

## 2023-05-31 PROCEDURE — 80061 LIPID PANEL: CPT | Performed by: NURSE PRACTITIONER

## 2023-05-31 PROCEDURE — 25010000002 HEPARIN (PORCINE) PER 1000 UNITS: Performed by: INTERNAL MEDICINE

## 2023-05-31 PROCEDURE — 99222 1ST HOSP IP/OBS MODERATE 55: CPT | Performed by: SURGERY

## 2023-05-31 PROCEDURE — B2151ZZ FLUOROSCOPY OF LEFT HEART USING LOW OSMOLAR CONTRAST: ICD-10-PCS | Performed by: INTERNAL MEDICINE

## 2023-05-31 PROCEDURE — 80048 BASIC METABOLIC PNL TOTAL CA: CPT | Performed by: NURSE PRACTITIONER

## 2023-05-31 PROCEDURE — 83036 HEMOGLOBIN GLYCOSYLATED A1C: CPT | Performed by: NURSE PRACTITIONER

## 2023-05-31 PROCEDURE — 93458 L HRT ARTERY/VENTRICLE ANGIO: CPT | Performed by: INTERNAL MEDICINE

## 2023-05-31 PROCEDURE — 85025 COMPLETE CBC W/AUTO DIFF WBC: CPT | Performed by: NURSE PRACTITIONER

## 2023-05-31 PROCEDURE — B2111ZZ FLUOROSCOPY OF MULTIPLE CORONARY ARTERIES USING LOW OSMOLAR CONTRAST: ICD-10-PCS | Performed by: INTERNAL MEDICINE

## 2023-05-31 RX ORDER — FENTANYL CITRATE 50 UG/ML
INJECTION, SOLUTION INTRAMUSCULAR; INTRAVENOUS
Status: DISCONTINUED | OUTPATIENT
Start: 2023-05-31 | End: 2023-05-31 | Stop reason: HOSPADM

## 2023-05-31 RX ORDER — LIDOCAINE HYDROCHLORIDE 20 MG/ML
INJECTION, SOLUTION INFILTRATION; PERINEURAL
Status: DISCONTINUED | OUTPATIENT
Start: 2023-05-31 | End: 2023-05-31 | Stop reason: HOSPADM

## 2023-05-31 RX ORDER — VERAPAMIL HYDROCHLORIDE 2.5 MG/ML
INJECTION, SOLUTION INTRAVENOUS
Status: DISCONTINUED | OUTPATIENT
Start: 2023-05-31 | End: 2023-05-31 | Stop reason: HOSPADM

## 2023-05-31 RX ORDER — NITROGLYCERIN 0.4 MG/1
0.4 TABLET SUBLINGUAL
Status: DISCONTINUED | OUTPATIENT
Start: 2023-05-31 | End: 2023-06-01 | Stop reason: HOSPADM

## 2023-05-31 RX ORDER — MIDAZOLAM HYDROCHLORIDE 1 MG/ML
INJECTION INTRAMUSCULAR; INTRAVENOUS
Status: DISCONTINUED | OUTPATIENT
Start: 2023-05-31 | End: 2023-05-31 | Stop reason: HOSPADM

## 2023-05-31 RX ORDER — ACETAMINOPHEN 325 MG/1
650 TABLET ORAL EVERY 4 HOURS PRN
Status: DISCONTINUED | OUTPATIENT
Start: 2023-05-31 | End: 2023-06-01 | Stop reason: HOSPADM

## 2023-05-31 RX ORDER — SODIUM CHLORIDE 9 MG/ML
125 INJECTION, SOLUTION INTRAVENOUS CONTINUOUS
Status: DISPENSED | OUTPATIENT
Start: 2023-05-31 | End: 2023-05-31

## 2023-05-31 RX ORDER — HEPARIN SODIUM 1000 [USP'U]/ML
INJECTION, SOLUTION INTRAVENOUS; SUBCUTANEOUS
Status: DISCONTINUED | OUTPATIENT
Start: 2023-05-31 | End: 2023-05-31 | Stop reason: HOSPADM

## 2023-05-31 RX ORDER — DIPHENHYDRAMINE HYDROCHLORIDE 50 MG/ML
INJECTION INTRAMUSCULAR; INTRAVENOUS
Status: DISCONTINUED | OUTPATIENT
Start: 2023-05-31 | End: 2023-05-31 | Stop reason: HOSPADM

## 2023-05-31 RX ADMIN — ENOXAPARIN SODIUM 80 MG: 100 INJECTION SUBCUTANEOUS at 17:19

## 2023-05-31 RX ADMIN — LEVOTHYROXINE SODIUM 112 MCG: 112 TABLET ORAL at 06:18

## 2023-05-31 RX ADMIN — BUDESONIDE AND FORMOTEROL FUMARATE DIHYDRATE 2 PUFF: 160; 4.5 AEROSOL RESPIRATORY (INHALATION) at 06:22

## 2023-05-31 RX ADMIN — PANTOPRAZOLE SODIUM 40 MG: 40 TABLET, DELAYED RELEASE ORAL at 08:55

## 2023-05-31 RX ADMIN — ATENOLOL 25 MG: 25 TABLET ORAL at 08:55

## 2023-05-31 RX ADMIN — ALLOPURINOL 100 MG: 100 TABLET ORAL at 08:55

## 2023-05-31 RX ADMIN — HYDRALAZINE HYDROCHLORIDE 100 MG: 50 TABLET ORAL at 21:44

## 2023-05-31 RX ADMIN — ATORVASTATIN CALCIUM 40 MG: 40 TABLET, FILM COATED ORAL at 21:42

## 2023-05-31 RX ADMIN — AMLODIPINE BESYLATE 10 MG: 10 TABLET ORAL at 08:55

## 2023-05-31 RX ADMIN — TAMSULOSIN HYDROCHLORIDE 0.4 MG: 0.4 CAPSULE ORAL at 08:55

## 2023-05-31 RX ADMIN — CALCITRIOL 0.25 MCG: 0.25 CAPSULE ORAL at 08:55

## 2023-05-31 RX ADMIN — CITALOPRAM 40 MG: 20 TABLET, FILM COATED ORAL at 08:55

## 2023-05-31 RX ADMIN — BUDESONIDE AND FORMOTEROL FUMARATE DIHYDRATE 2 PUFF: 160; 4.5 AEROSOL RESPIRATORY (INHALATION) at 20:21

## 2023-05-31 RX ADMIN — HYDRALAZINE HYDROCHLORIDE 100 MG: 50 TABLET ORAL at 08:55

## 2023-05-31 RX ADMIN — LOSARTAN POTASSIUM 50 MG: 50 TABLET, FILM COATED ORAL at 21:44

## 2023-05-31 RX ADMIN — ENOXAPARIN SODIUM 80 MG: 100 INJECTION SUBCUTANEOUS at 06:18

## 2023-05-31 RX ADMIN — LOSARTAN POTASSIUM 50 MG: 50 TABLET, FILM COATED ORAL at 08:55

## 2023-05-31 RX ADMIN — PANTOPRAZOLE SODIUM 40 MG: 40 TABLET, DELAYED RELEASE ORAL at 21:55

## 2023-05-31 RX ADMIN — ASPIRIN 81 MG: 81 TABLET, COATED ORAL at 08:55

## 2023-05-31 NOTE — TELEPHONE ENCOUNTER
Pt has [employer] ins.  Can someone see if we can get an auth before his hosp follow up appt on 6-9-23 with Dr Duque?/corazon

## 2023-05-31 NOTE — CASE MANAGEMENT/SOCIAL WORK
Continued Stay Note   Karla     Patient Name: Rajinder Riggs  MRN: [id number]  Today's Date: 5/31/2023    Admit Date: 5/30/2023    Plan: Home   Discharge Plan       Row Name 05/31/23 1105       Plan    Plan Home    Patient/Family in Agreement with Plan yes    Plan Comments Pt states he lives alone with his dog.  Pt has PCP, RX coverage and can afford medications.  Pt denies any dc needs and does not want hh services.    Final Discharge Disposition Code 01 - home or self-care                   Discharge Codes    No documentation.                 Expected Discharge Date and Time       Expected Discharge Date Expected Discharge Time    Jun 1, 2023               JEANNA Mena

## 2023-05-31 NOTE — PLAN OF CARE
Problem: Adult Inpatient Plan of Care  Goal: Plan of Care Review  Outcome: Ongoing, Progressing  Flowsheets (Taken 5/31/2023 0425)  Progress: no change  Plan of Care Reviewed With: patient  Outcome Evaluation: Patient had no c/o pain or discomfort throughout the night.  Patient has been NPO since MN.  Patient has signed his consent for his morning heart cath.  Dr. Zacarias came by the room and talked to him last night.  Patient had no further questions.  Patient to have am labs drawn.  Bed check in place.  Cont. to monitor.  Call for concerns.  S/SB 55-66 PVC   Goal Outcome Evaluation:  Plan of Care Reviewed With: patient        Progress: no change  Outcome Evaluation: Patient had no c/o pain or discomfort throughout the night.  Patient has been NPO since MN.  Patient has signed his consent for his morning heart cath.  Dr. Zacarias came by the room and talked to him last night.  Patient had no further questions.  Patient to have am labs drawn.  Bed check in place.  Cont. to monitor.  Call for concerns.  S/SB 55-66 PVC

## 2023-05-31 NOTE — CONSULTS
Referring Provider: Dr. You Zacarias  Reason for Consultation: Coronary artery disease    Patient Care Team:  Jcarlos Ro PA as PCP - Ze Mix MD (Inactive) as Consulting Physician (Urology)    Chief complaint: chest pain    Subjective .     History of present illness: Mr. Riggs is a 82-year-old male with known coronary artery disease, depression, diabetes mellitus, thyroid disease, hyperlipidemia, and hypertension.  He is a current smoker.  He presented to [facility] ER yesterday with complaints of chest pain.  Symptoms are described as nonexertional.  Pain did radiate to his neck.  He was administered nitroglycerin in route to the ER and reportedly his chest pain did improve and later completely resolved.  Upon arrival to the ER, CTA of the chest was obtained revealing no pulmonary emboli and trace left pleural effusion.  Troponins were elevated and EKG revealed normal sinus rhythm with no acute changes.  He underwent a dobutamine stress echocardiogram which was abnormal.  He was admitted to the cardiology service for further evaluation.  Subsequently, cardiac catheterization was performed today revealing multivessel coronary artery disease.  Cardiothoracic surgery has been consulted for consideration of CABG.  Creatinine today is 1.3, has been up to 1.6  last year.  He is currently resting in bed tolerating room air.  He tells me that he has noticed increasing fatigue over the past year or so to the point that he would have to rest several times while walking up a flight of stairs.  He is a current smoker, 1 pack/day for the past 65 years.    History  Code Status and Medical Interventions:   Ordered at: 05/30/23 0774     Level Of Support Discussed With:    Patient     Code Status (Patient has no pulse and is not breathing):    CPR (Attempt to Resuscitate)     Medical Interventions (Patient has pulse or is breathing):    Full Support     Release to patient:    Routine  Release         Past Medical History:   Diagnosis Date    Coronary artery disease     Depression     Diabetes mellitus     Disease of thyroid gland     Elevated cholesterol     Hypertension    ,   Past Surgical History:   Procedure Laterality Date    BACK SURGERY      CAROTID ARTERY - SUBCLAVIAN ARTERY BYPASS GRAFT      CHOLECYSTECTOMY      COLONOSCOPY  09/28/2012    Normal exam    COLONOSCOPY  02/26/2018    Normal exam repeat prn    ENDOSCOPY N/A 5/28/2019    A large amount of food residue in the stomach otherwise normal exam.    GALLBLADDER SURGERY     ,   Family History   Problem Relation Age of Onset    No Known Problems Father     No Known Problems Mother     Diabetes Brother     Colon cancer Neg Hx     Colon polyps Neg Hx    ,   Social History     Tobacco Use    Smoking status: Every Day     Packs/day: 1.00     Types: Cigarettes    Smokeless tobacco: Never   Vaping Use    Vaping Use: Unknown   Substance Use Topics    Alcohol use: No    Drug use: Never   ,   Medications Prior to Admission   Medication Sig Dispense Refill Last Dose    albuterol sulfate  (90 Base) MCG/ACT inhaler Inhale 2 puffs 4 (Four) Times a Day.   Past Month    allopurinol (ZYLOPRIM) 100 MG tablet Take 1 tablet by mouth Daily.   Past Month    amLODIPine (NORVASC) 10 MG tablet Take 1 tablet by mouth Daily.   Past Month    aspirin 81 MG EC tablet Take 1 tablet by mouth Daily.   Past Week    atenolol (TENORMIN) 50 MG tablet Take 25 mg by mouth Daily. Takes 1/2 tablet daily   5/30/2023    bismuth subsalicylate (PEPTO BISMOL) 262 MG/15ML suspension Take 30 mL by mouth Daily.   Past Week    calcitriol (ROCALTROL) 0.25 MCG capsule Take 1 capsule by mouth Daily.   Past Week    citalopram (CeleXA) 40 MG tablet Take 1 tablet by mouth Daily.   Past Week    diphenhydrAMINE (BENADRYL) 50 MG capsule Take 1 capsule by mouth At Night As Needed for Itching or Sleep.   Past Month    Fluticasone-Salmeterol (ADVAIR/WIXELA) 250-50 MCG/ACT DISKUS Inhale 1  puff 2 (Two) Times a Day.   Past Week    furosemide (LASIX) 20 MG tablet Take 10 mg by mouth Daily. Takes 1/2 tablet daily   Past Week    hydrALAZINE (APRESOLINE) 100 MG tablet Take 1 tablet by mouth 2 (Two) Times a Day. 1 AM and 1 HS   Past Week    ibuprofen (ADVIL,MOTRIN) 200 MG tablet Take 1 tablet by mouth Every 8 (Eight) Hours As Needed for Mild Pain.   Past Week    levothyroxine (SYNTHROID, LEVOTHROID) 112 MCG tablet Take 1 tablet by mouth Daily.   Past Week    loratadine (CLARITIN) 10 MG tablet Take 1 tablet by mouth Daily As Needed for Allergies.   Past Month    losartan (COZAAR) 100 MG tablet Take 50 mg by mouth 2 (Two) Times a Day.   Past Week    Omega-3 Fatty Acids (fish oil) 1000 MG capsule capsule Take 1 capsule by mouth Daily With Breakfast.   Past Week    pantoprazole (PROTONIX) 40 MG EC tablet Take 1 tablet by mouth 2 (Two) Times a Day.   Past Week    simvastatin (ZOCOR) 40 MG tablet Take 20 mg by mouth Every Night. Takes 1/2 tab nightly   Past Week    tamsulosin (FLOMAX) 0.4 MG capsule 24 hr capsule Take 1 capsule by mouth Daily. 30 capsule 11 Past Week    vitamin B-12 (CYANOCOBALAMIN) 1000 MCG tablet Take 1 tablet by mouth Daily.   Past Week    Lactobacillus (PROBIOTIC ACIDOPHILUS PO) Take 2 capsules by mouth 2 (Two) Times a Day.      , Allergies: Patient has no known allergies.    Review of Systems  Review of Systems   Constitutional:  Positive for activity change and fatigue. Negative for chills, diaphoresis and fever.   HENT:  Negative for trouble swallowing and voice change.    Eyes:  Negative for visual disturbance.   Respiratory:  Positive for chest tightness and shortness of breath.    Cardiovascular:  Positive for chest pain. Negative for palpitations and leg swelling.   Gastrointestinal:  Negative for abdominal pain, diarrhea, nausea and vomiting.   Genitourinary:  Negative for difficulty urinating, dysuria and hematuria.   Musculoskeletal:  Negative for arthralgias and myalgias.        He  "does complain of peripheral neuropathy.   Skin:  Negative for color change, pallor, rash and wound.   Allergic/Immunologic: Negative for immunocompromised state.   Neurological:  Negative for dizziness, syncope and light-headedness.   Psychiatric/Behavioral:  Negative for agitation, confusion and sleep disturbance.       Objective     Vital Signs   Visit Vitals  /47 (BP Location: Left arm, Patient Position: Lying)   Pulse 52   Temp 97.8 °F (36.6 °C) (Axillary)   Resp 16   Ht 185.4 cm (73\")   Wt 72.5 kg (159 lb 12.8 oz)   SpO2 96%   BMI 21.08 kg/m²       Physical Exam  Vitals reviewed.   Constitutional:       General: He is not in acute distress.  HENT:      Head: Normocephalic.   Eyes:      Pupils: Pupils are equal, round, and reactive to light.   Cardiovascular:      Rate and Rhythm: Normal rate and regular rhythm.      Heart sounds: Normal heart sounds. No murmur heard.  Pulmonary:      Breath sounds: No wheezing or rales.      Comments: Diminished lung sounds bilaterally  Abdominal:      General: There is no distension.      Palpations: Abdomen is soft.      Tenderness: There is no abdominal tenderness.   Musculoskeletal:         General: No swelling or tenderness.   Skin:     General: Skin is warm and dry.      Comments: Multiple sores to bilateral lower extremities in various stages of healing.  Brawny discoloration to bilateral lower extremities   Neurological:      General: No focal deficit present.      Mental Status: He is alert and oriented to person, place, and time.   Psychiatric:         Mood and Affect: Mood normal.         Behavior: Behavior normal.         Thought Content: Thought content normal.         Judgment: Judgment normal.         LAB:   CBC:  Results from last 7 days   Lab Units 05/31/23  0302 05/30/23  0921   WBC 10*3/mm3 8.95 10.08   HEMATOCRIT % 28.4* 30.6*   PLATELETS 10*3/mm3 186 198          BMP:)  Results from last 7 days   Lab Units 05/31/23  0302 05/30/23  0921   SODIUM mmol/L " 137 140   POTASSIUM mmol/L 3.9 4.4   CHLORIDE mmol/L 104 105   CO2 mmol/L 25.0 25.0   GLUCOSE mg/dL 97 122*   BUN mg/dL 29* 27*   CREATININE mg/dL 1.34* 1.18           COAG:  Results from last 7 days   Lab Units 05/30/23  1729   INR  1.09           IMAGES:       Imaging Results (Last 24 Hours)       ** No results found for the last 24 hours. **                         Assessment & Plan        Atherosclerosis of coronary artery of native heart with angina pectoris, unspecified vessel or lesion type    Abnormal stress test    Unstable angina pectoris  Tobacco use  Hypertension  Peripheral vascular disease    Dr. Duque and I discussed the patient's findings and my recommendations with the patient.  We discussed the options for treatment of coronary artery disease to include medical therapy, coronary stenting, and surgical revascularization.  We discussed the risks and benefits and how it relates to his case.  He will need additional work-up to adequately determine his surgical risk.  The STS Risk score will be calculated using the STS Risk Calculator and discussed with the patient.  We discussed the operative conduct and expected hospital and outpatient recovery.  Risks were discussed to include but not limited to bleeding, infection, stroke, heart attack, need for additional procedures, anesthesia risk, organ dysfunction and/or death, prolonged mechanical ventilation, prolonged ICU stay, chronic pain syndromes, sternal nonunion, and/or death.  We discussed the need to utilize all medical treatments additionally prescribed post surgery.       The patient understands the risks, benefits, and alternatives and he wishes to have additional time to think about his options.  Dr. Duque has discussed with Dr. Zacarias and we will complete his preoperative work-up while he is inpatient with plans to discharge home and to follow-up with Dr. Duque as an outpatient next Friday, 6/9/2023 to review testing and to further discuss surgery.   The patient is agreeable to this plan.    Shama Knox, BRIANA  05/31/23  14:38 CDT

## 2023-06-01 ENCOUNTER — APPOINTMENT (OUTPATIENT)
Dept: PULMONOLOGY | Facility: HOSPITAL | Age: 83
DRG: 287 | End: 2023-06-01
Payer: MEDICARE

## 2023-06-01 ENCOUNTER — APPOINTMENT (OUTPATIENT)
Dept: ULTRASOUND IMAGING | Facility: HOSPITAL | Age: 83
DRG: 287 | End: 2023-06-01
Payer: MEDICARE

## 2023-06-01 VITALS
BODY MASS INDEX: 21.1 KG/M2 | HEIGHT: 73 IN | HEART RATE: 67 BPM | SYSTOLIC BLOOD PRESSURE: 143 MMHG | OXYGEN SATURATION: 96 % | WEIGHT: 159.25 LBS | TEMPERATURE: 98 F | RESPIRATION RATE: 16 BRPM | DIASTOLIC BLOOD PRESSURE: 51 MMHG

## 2023-06-01 LAB
ANION GAP SERPL CALCULATED.3IONS-SCNC: 10 MMOL/L (ref 5–15)
BUN SERPL-MCNC: 32 MG/DL (ref 8–23)
BUN/CREAT SERPL: 21.3 (ref 7–25)
CALCIUM SPEC-SCNC: 9 MG/DL (ref 8.6–10.5)
CHLORIDE SERPL-SCNC: 104 MMOL/L (ref 98–107)
CO2 SERPL-SCNC: 23 MMOL/L (ref 22–29)
CREAT SERPL-MCNC: 1.5 MG/DL (ref 0.76–1.27)
DEPRECATED RDW RBC AUTO: 55.8 FL (ref 37–54)
EGFRCR SERPLBLD CKD-EPI 2021: 46.2 ML/MIN/1.73
ERYTHROCYTE [DISTWIDTH] IN BLOOD BY AUTOMATED COUNT: 15.9 % (ref 12.3–15.4)
GLUCOSE SERPL-MCNC: 117 MG/DL (ref 65–99)
HCT VFR BLD AUTO: 28.4 % (ref 37.5–51)
HGB BLD-MCNC: 9.1 G/DL (ref 13–17.7)
MCH RBC QN AUTO: 31 PG (ref 26.6–33)
MCHC RBC AUTO-ENTMCNC: 32 G/DL (ref 31.5–35.7)
MCV RBC AUTO: 96.6 FL (ref 79–97)
PLATELET # BLD AUTO: 192 10*3/MM3 (ref 140–450)
PMV BLD AUTO: 10.5 FL (ref 6–12)
POTASSIUM SERPL-SCNC: 3.9 MMOL/L (ref 3.5–5.2)
RBC # BLD AUTO: 2.94 10*6/MM3 (ref 4.14–5.8)
SODIUM SERPL-SCNC: 137 MMOL/L (ref 136–145)
WBC NRBC COR # BLD: 10.5 10*3/MM3 (ref 3.4–10.8)

## 2023-06-01 PROCEDURE — 99239 HOSP IP/OBS DSCHRG MGMT >30: CPT | Performed by: NURSE PRACTITIONER

## 2023-06-01 PROCEDURE — 93880 EXTRACRANIAL BILAT STUDY: CPT

## 2023-06-01 PROCEDURE — 94010 BREATHING CAPACITY TEST: CPT

## 2023-06-01 PROCEDURE — 93880 EXTRACRANIAL BILAT STUDY: CPT | Performed by: SURGERY

## 2023-06-01 PROCEDURE — 99232 SBSQ HOSP IP/OBS MODERATE 35: CPT | Performed by: SURGERY

## 2023-06-01 PROCEDURE — 80048 BASIC METABOLIC PNL TOTAL CA: CPT | Performed by: INTERNAL MEDICINE

## 2023-06-01 PROCEDURE — 94799 UNLISTED PULMONARY SVC/PX: CPT

## 2023-06-01 PROCEDURE — 93970 EXTREMITY STUDY: CPT | Performed by: SURGERY

## 2023-06-01 PROCEDURE — 93970 EXTREMITY STUDY: CPT

## 2023-06-01 PROCEDURE — 85027 COMPLETE CBC AUTOMATED: CPT | Performed by: INTERNAL MEDICINE

## 2023-06-01 PROCEDURE — 25010000002 ENOXAPARIN PER 10 MG: Performed by: INTERNAL MEDICINE

## 2023-06-01 RX ORDER — ISOSORBIDE MONONITRATE 30 MG/1
30 TABLET, EXTENDED RELEASE ORAL
Qty: 90 TABLET | Refills: 3 | Status: SHIPPED | OUTPATIENT
Start: 2023-06-01

## 2023-06-01 RX ORDER — ISOSORBIDE MONONITRATE 30 MG/1
30 TABLET, EXTENDED RELEASE ORAL
Status: DISCONTINUED | OUTPATIENT
Start: 2023-06-01 | End: 2023-06-01 | Stop reason: HOSPADM

## 2023-06-01 RX ORDER — ATORVASTATIN CALCIUM 40 MG/1
40 TABLET, FILM COATED ORAL NIGHTLY
Qty: 90 TABLET | Refills: 3 | Status: SHIPPED | OUTPATIENT
Start: 2023-06-01

## 2023-06-01 RX ORDER — NITROGLYCERIN 0.4 MG/1
0.4 TABLET SUBLINGUAL
Qty: 25 TABLET | Refills: 1 | Status: SHIPPED | OUTPATIENT
Start: 2023-06-01

## 2023-06-01 RX ADMIN — ATENOLOL 25 MG: 25 TABLET ORAL at 08:00

## 2023-06-01 RX ADMIN — HYDRALAZINE HYDROCHLORIDE 100 MG: 50 TABLET ORAL at 08:01

## 2023-06-01 RX ADMIN — CITALOPRAM 40 MG: 20 TABLET, FILM COATED ORAL at 08:01

## 2023-06-01 RX ADMIN — LEVOTHYROXINE SODIUM 112 MCG: 112 TABLET ORAL at 06:24

## 2023-06-01 RX ADMIN — ASPIRIN 81 MG: 81 TABLET, COATED ORAL at 08:01

## 2023-06-01 RX ADMIN — PANTOPRAZOLE SODIUM 40 MG: 40 TABLET, DELAYED RELEASE ORAL at 08:00

## 2023-06-01 RX ADMIN — TAMSULOSIN HYDROCHLORIDE 0.4 MG: 0.4 CAPSULE ORAL at 08:01

## 2023-06-01 RX ADMIN — AMLODIPINE BESYLATE 10 MG: 10 TABLET ORAL at 08:01

## 2023-06-01 RX ADMIN — ALLOPURINOL 100 MG: 100 TABLET ORAL at 08:01

## 2023-06-01 RX ADMIN — ENOXAPARIN SODIUM 80 MG: 100 INJECTION SUBCUTANEOUS at 06:25

## 2023-06-01 RX ADMIN — CALCITRIOL 0.25 MCG: 0.25 CAPSULE ORAL at 08:01

## 2023-06-01 RX ADMIN — LOSARTAN POTASSIUM 50 MG: 50 TABLET, FILM COATED ORAL at 08:01

## 2023-06-01 NOTE — PROGRESS NOTES
"Patient name: Rajinder Riggs  Patient : 1940  VISIT # [id number]  MR #[id number]    Procedure:Procedure(s):  Left Heart Cath  Procedure Date:2023  POD:1 Day Post-Op    Subjective   Chief Complaint   Patient presents with    Chest Pain     Started last night, Nitro x3 by [employer]; last dose 0821,      Patient is sitting up on the side of the bed eating breakfast. Tolerating room air. No complaints of pain. No overnight events reported. Dr. Duque discussed the risks and benefits of CABG with the patient. Patient states that he is unsure whether he wants to proceed with surgery at this time, but agrees to be seen in the office in a few weeks to review preoperative testing and to further discuss his options.           Objective     Visit Vitals  /52 (BP Location: Left arm, Patient Position: Lying)   Pulse 63   Temp 98.1 °F (36.7 °C) (Oral)   Resp 16   Ht 185.4 cm (73\")   Wt 72.2 kg (159 lb 4 oz)   SpO2 95%   BMI 21.01 kg/m²       Intake/Output Summary (Last 24 hours) at 2023 0852  Last data filed at 2023 0745  Gross per 24 hour   Intake 620 ml   Output 1250 ml   Net -630 ml       Lab:     CBC:  Results from last 7 days   Lab Units 23  0302 23  0921   WBC 10*3/mm3 10.50 8.95 10.08   HEMATOCRIT % 28.4* 28.4* 30.6*   PLATELETS 10*3/mm3 192 186 198          BMP:  Results from last 7 days   Lab Units 23  0302 23  0921   SODIUM mmol/L 137 137 140   POTASSIUM mmol/L 3.9 3.9 4.4   CHLORIDE mmol/L 104 104 105   CO2 mmol/L 23.0 25.0 25.0   GLUCOSE mg/dL 117* 97 122*   BUN mg/dL 32* 29* 27*   CREATININE mg/dL 1.50* 1.34* 1.18          COAG:  Results from last 7 days   Lab Units 23  1729   INR  1.09       IMAGES:       Imaging Results (Last 24 Hours)       ** No results found for the last 24 hours. **              Physical Exam:  General: Alert, oriented. No apparent distress.   Cardiovascular: Regular rate and rhythm without murmur, rubs, or " gallops.    Pulmonary: Diminished breath sounds bilaterally. No wheezing, rubs, or rales.  Abdomen: Soft, non distended, and non tender.  Extremities: Warm, moves all extremities. Multiple sores noted to bilateral lower extremities in various stages of healing. Brawny discoloration to bilateral lower extremities.   Neurologic:  Grossly intact with no focal deficits.            Impression:  Atherosclerosis of coronary artery of native heart with angina pectoris, unspecified vessel or lesion type  Abnormal stress test  Unstable angina pectoris  Tobacco use  Hypertension  Peripheral vascular disease    Plan:  Complete preoperative testing, carotid ultrasound and vein mapping today   Continue medical management per cardiology   Dr. Duque discussed with Dr. Zacarias, we will complete preoperative testing while he is inpatient with plans to discharge home and follow-up with Dr. Duque as an outpatient 6/9/2023 to review testing and further discuss surgery.   Discussed with patient and nursing     BRIANA Oconnell  06/01/23  08:52 CDT

## 2023-06-01 NOTE — NURSING NOTE
Patient had his heart cath today per Dr. Zacarias. Right radial c/d/I/Pulses palpable. CT consulted and did see patient today. Orders in place for pre Cabg. VSS/RA. S/SB 50s-60s per tele. Continue monitoring pt and labs in AM.

## 2023-06-01 NOTE — PLAN OF CARE
Problem: Adult Inpatient Plan of Care  Goal: Plan of Care Review  Flowsheets (Taken 6/1/2023 0637)  Progress: no change  Plan of Care Reviewed With: patient  Outcome Evaluation: TR band removed at the beginning of the shift. R radial cath site, FRANCESCO & CDI, soft. Pulse palpable. SB/S HR: 59-76 with coup, MF, pvc, pac on tele. Pt c/o back pain this AM, offered pt PRN Tylenol, pt stated he was told not to take Tylenol and that he takes pain medicine at home. Informed patient that he does not have any pain meds ordered & offered to notify MD about other meds, pt refused, pt planning to go home today per pt report. Plans for pre-cabg workup tests today. Possible discharge home soon. Frequent voiding noted. Voiding per urinal. Bed alarm in use. Safety maintained.

## 2023-06-01 NOTE — DISCHARGE SUMMARY
[facility] HEART GROUP DISCHARGE    Date of Discharge:  6/1/2023    Discharge Diagnosis:     -Unstable angina, chronic myocardial injury-> severe left main plus three-vessel coronary artery disease not amenable to PCI    -Moderate aortic valve regurgitation, mild to moderate aortic stenosis  -Hypertension  -Hyperlipidemia  -Diabetes mellitus type 2  -Carotid artery disease  -Tobacco abuse  -Anemia  -CKD III    Presenting Problem/History of Present Illness  Atherosclerosis of coronary artery of native heart with angina pectoris, unspecified vessel or lesion type [I25.119]    Per HPI on admission:    Rajinder Riggs is a 82 y.o. male presents with chest pain.  He tells me this was nonexertional and started yesterday around noon.  He describes this as a congestion in his chest that radiated downward to his ribs and upward into his neck and then to both shoulders.  He states this occurred off and on all night long, until he decided to seek attention earlier this morning.  Was flown in by helicopter.  He states he received full dose aspirin and nitroglycerin in route to ER.  He states the nitroglycerin eased his chest discomfort approximately 80%, and this later completely resolved on its own.  He denies any other similar episodes of chest discomfort.  He is currently chest pain-free but tells me he continues to have some of his usual chronic bilateral shoulder pain.  CTA of the chest was negative for acute findings.  Troponins were elevated but flat trending at 40 and 39.  EKGs revealed normal sinus rhythm, without acute ischemic changes.     In the ER he received Lopressor IV, morphine, Zofran.  He underwent a dobutamine stress echocardiogram which was abnormal.  See below.     He does have hypertension, hyperlipidemia, carotid artery disease, diabetes mellitus type 2, and is a smoker.  Reports prior vascular surgery on his carotids, but denies ever having coronary intervention.     He believes he may  have had some stress testing through the [place], [place] in the past.     Tells me he has labile blood pressures-sometimes normal and sometimes elevated.  He reports he is compliant with his medications and his blood pressures have not been any different than usual as of late.        Hospital Course  Patient is a 82 y.o. male presented with chest pain as described above.  He was taken to the cardiac Cath Lab and found to have severe left main plus three-vessel coronary artery disease that did not appear amenable to coronary intervention.  He had a normal LVEF and normal LVEDP.  Cardiothoracic surgery was consulted to evaluate for the possibility of CABG.    The patient has been evaluated by Dr. Duque.  Preoperative testing including vein mapping and carotid ultrasound have been completed.  The patient is going to follow-up with Dr. Duque on 6/9 for further discussion regarding the possibility of CABG.    In terms of his medical therapy, his aspirin and beta-blocker have been continued.  Statin has been changed to high intensity statin.  Imdur 30 mg daily has been added to his medical therapy.  His other home antihypertensives have been continued.  He has a normal LVEF on echocardiogram.    He has done well following his cardiac catheterization and ambulated in the halls without any chest discomfort whatsoever.  Discussed the case with Dr. Zacarias.    He is felt to be stable for discharge on the medical therapy listed below.    He has not been referred for cardiac rehab as he has not been revascularized and CABG is being considered.    He has been counseled extensively on the importance of smoking cessation.      Echo:      Left ventricular systolic function is normal. Left ventricular ejection fraction appears to be 66 - 70%.    Calcified trileaflet aortic valve, with moderate aortic valve regurgitation and mild-moderate aortic stenosis.    The left atrial cavity is dilated.    Estimated right ventricular systolic  pressure from tricuspid regurgitation is mildly elevated (35-45 mmHg).    Normal size and function of the right ventricle.    No other significant (greater than mild) valvular pathology.    No prior studies available for comparison.       Procedures Performed  Procedure(s):  Left Heart Cath  05/31 1320 Note By: You Zacarias MD    Impression:  1. Severe left main plus three-vessel coronary artery disease, that does not appear amenable to percutaneous coronary intervention  2. Normal LVEF with normal LVEDP    Plan:   1. TR band off in 2 hours  2. Continue aspirin 81mg daily indefinitely, and other disease and risk factor modifying drugs (including high intensity statin)  3. Inpatient cardiothoracic surgical consultation to evaluate for CABG. The LAD, OM1, OM 2, and distal RCA all appear to be excellent targets.    You Zacarias MD     Consults:   Consults       Date and Time Order Name Status Description    5/31/2023  2:18 PM Inpatient Cardiothoracic Surgery Consult            Lab Results (last 72 hours)       Procedure Component Value Units Date/Time    Basic Metabolic Panel [[id number]]  (Abnormal) Collected: 06/01/23 0311    Specimen: Blood Updated: 06/01/23 0346     Glucose 117 mg/dL      BUN 32 mg/dL      Creatinine 1.50 mg/dL      Sodium 137 mmol/L      Potassium 3.9 mmol/L      Chloride 104 mmol/L      CO2 23.0 mmol/L      Calcium 9.0 mg/dL      BUN/Creatinine Ratio 21.3     Anion Gap 10.0 mmol/L      eGFR 46.2 mL/min/1.73     Narrative:      GFR Normal >60  Chronic Kidney Disease <60  Kidney Failure <15    The GFR formula is only valid for adults with stable renal function between ages 18 and 70.    CBC (No Diff) [[id number]]  (Abnormal) Collected: 06/01/23 0311    Specimen: Blood Updated: 06/01/23 0328     WBC 10.50 10*3/mm3      RBC 2.94 10*6/mm3      Hemoglobin 9.1 g/dL      Hematocrit 28.4 %      MCV 96.6 fL      MCH 31.0 pg      MCHC 32.0 g/dL      RDW 15.9 %      RDW-SD 55.8 fl      MPV 10.5 fL       Platelets 192 10*3/mm3     Basic Metabolic Panel [[id number]]  (Abnormal) Collected: 05/31/23 0302    Specimen: Blood Updated: 05/31/23 0330     Glucose 97 mg/dL      BUN 29 mg/dL      Creatinine 1.34 mg/dL      Sodium 137 mmol/L      Potassium 3.9 mmol/L      Chloride 104 mmol/L      CO2 25.0 mmol/L      Calcium 8.8 mg/dL      BUN/Creatinine Ratio 21.6     Anion Gap 8.0 mmol/L      eGFR 52.9 mL/min/1.73     Narrative:      GFR Normal >60  Chronic Kidney Disease <60  Kidney Failure <15    The GFR formula is only valid for adults with stable renal function between ages 18 and 70.    Lipid Panel [[id number]]  (Abnormal) Collected: 05/31/23 0302    Specimen: Blood Updated: 05/31/23 0330     Total Cholesterol 96 mg/dL      Triglycerides 61 mg/dL      HDL Cholesterol 30 mg/dL      LDL Cholesterol  52 mg/dL      VLDL Cholesterol 14 mg/dL      LDL/HDL Ratio 1.79    Narrative:      Cholesterol Reference Ranges  (U.S. Department of Health and Human Services ATP III Classifications)    Desirable          <200 mg/dL  Borderline High    200-239 mg/dL  High Risk          >240 mg/dL      Triglyceride Reference Ranges  (U.S. Department of Health and Human Services ATP III Classifications)    Normal           <150 mg/dL  Borderline High  150-199 mg/dL  High             200-499 mg/dL  Very High        >500 mg/dL    HDL Reference Ranges  (U.S. Department of Health and Human Services ATP III Classifications)    Low     <40 mg/dl (major risk factor for CHD)  High    >60 mg/dl ('negative' risk factor for CHD)        LDL Reference Ranges  (U.S. Department of Health and Human Services ATP III Classifications)    Optimal          <100 mg/dL  Near Optimal     100-129 mg/dL  Borderline High  130-159 mg/dL  High             160-189 mg/dL  Very High        >189 mg/dL    Hemoglobin A1c [[id number]]  (Normal) Collected: 05/31/23 0302    Specimen: Blood Updated: 05/31/23 0328     Hemoglobin A1C 5.20 %     Narrative:      Hemoglobin A1C  Ranges:    Increased Risk for Diabetes  5.7% to 6.4%  Diabetes                     >= 6.5%  Diabetic Goal                < 7.0%    CBC & Differential [[id number]]  (Abnormal) Collected: 05/31/23 0302    Specimen: Blood Updated: 05/31/23 0312    Narrative:      The following orders were created for panel order CBC & Differential.  Procedure                               Abnormality         Status                     ---------                               -----------         ------                     CBC Auto Differential[[id number]]        Abnormal            Final result                 Please view results for these tests on the individual orders.    CBC Auto Differential [[id number]]  (Abnormal) Collected: 05/31/23 0302    Specimen: Blood Updated: 05/31/23 0312     WBC 8.95 10*3/mm3      RBC 2.92 10*6/mm3      Hemoglobin 9.1 g/dL      Hematocrit 28.4 %      MCV 97.3 fL      MCH 31.2 pg      MCHC 32.0 g/dL      RDW 15.9 %      RDW-SD 56.2 fl      MPV 10.5 fL      Platelets 186 10*3/mm3      Neutrophil % 65.4 %      Lymphocyte % 19.4 %      Monocyte % 13.5 %      Eosinophil % 1.0 %      Basophil % 0.4 %      Immature Grans % 0.3 %      Neutrophils, Absolute 5.84 10*3/mm3      Lymphocytes, Absolute 1.74 10*3/mm3      Monocytes, Absolute 1.21 10*3/mm3      Eosinophils, Absolute 0.09 10*3/mm3      Basophils, Absolute 0.04 10*3/mm3      Immature Grans, Absolute 0.03 10*3/mm3      nRBC 0.0 /100 WBC     Protime-INR [[id number]]  (Normal) Collected: 05/30/23 1729    Specimen: Blood Updated: 05/30/23 1800     Protime 14.2 Seconds      INR 1.09    High Sensitivity Troponin T 2Hr [[id number]]  (Abnormal) Collected: 05/30/23 1112    Specimen: Blood Updated: 05/30/23 1139     HS Troponin T 39 ng/L      Troponin T Delta -1 ng/L     Narrative:      High Sensitive Troponin T Reference Range:  <10.0 ng/L- Negative Female for AMI  <15.0 ng/L- Negative Male for AMI  >=10 - Abnormal Female indicating possible myocardial injury.  >=15 -  Abnormal Male indicating possible myocardial injury.   Clinicians would have to utilize clinical acumen, EKG, Troponin, and serial changes to determine if it is an Acute Myocardial Infarction or myocardial injury due to an underlying chronic condition.         [place] Draw [[id number]] Collected: 05/30/23 0921    Specimen: Blood Updated: 05/30/23 1031    Narrative:      The following orders were created for panel order [place] Draw.  Procedure                               Abnormality         Status                     ---------                               -----------         ------                     Green Top (Gel)[[id number]]                                  Final result               Lavender Top[[id number]]                                     Final result               Red Top[[id number]]                                          Final result               Light Blue Top[[id number]]                                   Final result                 Please view results for these tests on the individual orders.    Lavender Top [[id number]] Collected: 05/30/23 0921    Specimen: Blood Updated: 05/30/23 1031     Extra Tube hold for add-on     Comment: Auto resulted       Red Top [[id number]] Collected: 05/30/23 0921    Specimen: Blood Updated: 05/30/23 1031     Extra Tube Hold for add-ons.     Comment: Auto resulted.       Green Top (Gel) [[id number]] Collected: 05/30/23 0921    Specimen: Blood Updated: 05/30/23 1031     Extra Tube Hold for add-ons.     Comment: Auto resulted.       Light Blue Top [[id number]] Collected: 05/30/23 0921    Specimen: Blood Updated: 05/30/23 1031     Extra Tube Hold for add-ons.     Comment: Auto resulted       Comprehensive Metabolic Panel [[id number]]  (Abnormal) Collected: 05/30/23 0921    Specimen: Blood Updated: 05/30/23 1003     Glucose 122 mg/dL      BUN 27 mg/dL      Creatinine 1.18 mg/dL      Sodium 140 mmol/L      Potassium 4.4 mmol/L      Chloride 105 mmol/L      CO2 25.0 mmol/L       Calcium 9.1 mg/dL      Total Protein 7.1 g/dL      Albumin 3.4 g/dL      ALT (SGPT) 17 U/L      AST (SGOT) 29 U/L      Alkaline Phosphatase 63 U/L      Total Bilirubin 0.8 mg/dL      Globulin 3.7 gm/dL      A/G Ratio 0.9 g/dL      BUN/Creatinine Ratio 22.9     Anion Gap 10.0 mmol/L      eGFR 61.6 mL/min/1.73     Narrative:      GFR Normal >60  Chronic Kidney Disease <60  Kidney Failure <15    The GFR formula is only valid for adults with stable renal function between ages 18 and 70.    High Sensitivity Troponin T [[id number]]  (Abnormal) Collected: 05/30/23 0921    Specimen: Blood Updated: 05/30/23 0957     HS Troponin T 40 ng/L     Narrative:      High Sensitive Troponin T Reference Range:  <10.0 ng/L- Negative Female for AMI  <15.0 ng/L- Negative Male for AMI  >=10 - Abnormal Female indicating possible myocardial injury.  >=15 - Abnormal Male indicating possible myocardial injury.   Clinicians would have to utilize clinical acumen, EKG, Troponin, and serial changes to determine if it is an Acute Myocardial Infarction or myocardial injury due to an underlying chronic condition.         CBC & Differential [[id number]]  (Abnormal) Collected: 05/30/23 0921    Specimen: Blood Updated: 05/30/23 0934    Narrative:      The following orders were created for panel order CBC & Differential.  Procedure                               Abnormality         Status                     ---------                               -----------         ------                     CBC Auto Differential[[id number]]        Abnormal            Final result                 Please view results for these tests on the individual orders.    CBC Auto Differential [[id number]]  (Abnormal) Collected: 05/30/23 0921    Specimen: Blood Updated: 05/30/23 0934     WBC 10.08 10*3/mm3      RBC 3.12 10*6/mm3      Hemoglobin 9.6 g/dL      Hematocrit 30.6 %      MCV 98.1 fL      MCH 30.8 pg      MCHC 31.4 g/dL      RDW 16.0 %      RDW-SD 57.1 fl      MPV 11.0 fL       Platelets 198 10*3/mm3      Neutrophil % 75.6 %      Lymphocyte % 9.6 %      Monocyte % 13.3 %      Eosinophil % 0.8 %      Basophil % 0.4 %      Immature Grans % 0.3 %      Neutrophils, Absolute 7.62 10*3/mm3      Lymphocytes, Absolute 0.97 10*3/mm3      Monocytes, Absolute 1.34 10*3/mm3      Eosinophils, Absolute 0.08 10*3/mm3      Basophils, Absolute 0.04 10*3/mm3      Immature Grans, Absolute 0.03 10*3/mm3      nRBC 0.0 /100 WBC              Condition on Discharge:  - Stable     Physical Exam at Discharge  General: alert and oriented  Card: RRR- SR 50s-60s on tele  Resp: CTA  Extrem: no edema     Vital Signs  Temp:  [98 °F (36.7 °C)-98.3 °F (36.8 °C)] 98 °F (36.7 °C)  Heart Rate:  [56-69] 67  Resp:  [16] 16  BP: (126-176)/(41-54) 143/51    Discharge Disposition  Home or Self Care    Discharge Medications     Discharge Medications        New Medications        Instructions Start Date   atorvastatin 40 MG tablet  Commonly known as: LIPITOR   40 mg, Oral, Nightly      isosorbide mononitrate 30 MG 24 hr tablet  Commonly known as: IMDUR   30 mg, Oral, Every 24 Hours Scheduled      nitroglycerin 0.4 MG SL tablet  Commonly known as: NITROSTAT   0.4 mg, Sublingual, Every 5 Minutes PRN, Take no more than 3 doses in 15 minutes.             Continue These Medications        Instructions Start Date   albuterol sulfate  (90 Base) MCG/ACT inhaler  Commonly known as: PROVENTIL HFA;VENTOLIN HFA;PROAIR HFA   2 puffs, Inhalation, 4 Times Daily - RT      allopurinol 100 MG tablet  Commonly known as: ZYLOPRIM   100 mg, Oral, Daily      amLODIPine 10 MG tablet  Commonly known as: NORVASC   10 mg, Oral, Daily      aspirin 81 MG EC tablet   81 mg, Oral, Daily      atenolol 50 MG tablet  Commonly known as: TENORMIN   25 mg, Oral, Daily, Takes 1/2 tablet daily       bismuth subsalicylate 262 MG/15ML suspension  Commonly known as: PEPTO BISMOL   30 mL, Oral, Daily      calcitriol 0.25 MCG capsule  Commonly known as: ROCALTROL    0.25 mcg, Oral, Daily      citalopram 40 MG tablet  Commonly known as: CeleXA   40 mg, Oral, Daily      diphenhydrAMINE 50 MG capsule  Commonly known as: BENADRYL   50 mg, Oral, Nightly PRN      fish oil 1000 MG capsule capsule   1,000 mg, Oral, Daily With Breakfast      Fluticasone-Salmeterol 250-50 MCG/ACT DISKUS  Commonly known as: ADVAIR/WIXELA   1 puff, Inhalation, 2 Times Daily - RT      hydrALAZINE 100 MG tablet  Commonly known as: APRESOLINE   100 mg, Oral, 2 Times Daily, 1 AM and 1 HS       levothyroxine 112 MCG tablet  Commonly known as: SYNTHROID, LEVOTHROID   112 mcg, Oral, Daily      loratadine 10 MG tablet  Commonly known as: CLARITIN   10 mg, Oral, Daily PRN      losartan 100 MG tablet  Commonly known as: COZAAR   50 mg, Oral, 2 Times Daily      pantoprazole 40 MG EC tablet  Commonly known as: PROTONIX   40 mg, Oral, 2 Times Daily      PROBIOTIC ACIDOPHILUS PO   2 capsules, Oral, 2 Times Daily      tamsulosin 0.4 MG capsule 24 hr capsule  Commonly known as: FLOMAX   0.4 mg, Oral, Daily      vitamin B-12 1000 MCG tablet  Commonly known as: CYANOCOBALAMIN   1,000 mcg, Oral, Daily             Stop These Medications      furosemide 20 MG tablet  Commonly known as: LASIX     ibuprofen 200 MG tablet  Commonly known as: ADVIL,MOTRIN     simvastatin 40 MG tablet  Commonly known as: ZOCOR            Lab Results   Component Value Date    HGBA1C 5.20 05/31/2023      Lab Results   Component Value Date    CHOL 96 05/31/2023    TRIG 61 05/31/2023    HDL 30 (L) 05/31/2023    LDL 52 05/31/2023      Discharge Diet: heart healthy    Activity at Discharge:     No heavy lifting for 5-7 days greater than 10 pounds.  No heavy or strenuous pushing or pulling.  No tub baths, hot tubs, swimming pools, or submerging underwater for 1 week.  Wash site daily with antibacterial soap and water. Rinse and keep clean and dry.  No lotions, powders, or topical ointments to site. Keep open to air and dry.  Call our office with any  concerns or if you notice redness, swelling, drainage, warmth, or pain at the site.     Follow-up Appointments  Dr. Duque 6/9  Dr. Zacarias 4 weeks  PCP 1-2 weeks     Future Appointments   Date Time Provider Department Center   6/9/2023  1:30 PM Blaine Duque MD [facility]  [facility]          Electronically signed by BRIANA Man, 06/01/23, 2:56 PM CDT.       Time - 40 minutes

## 2023-06-09 ENCOUNTER — OFFICE VISIT (OUTPATIENT)
Dept: CARDIAC SURGERY | Facility: CLINIC | Age: 83
End: 2023-06-09
Payer: OTHER GOVERNMENT

## 2023-06-09 VITALS
HEART RATE: 54 BPM | SYSTOLIC BLOOD PRESSURE: 118 MMHG | WEIGHT: 157 LBS | HEIGHT: 73 IN | BODY MASS INDEX: 20.81 KG/M2 | DIASTOLIC BLOOD PRESSURE: 52 MMHG | OXYGEN SATURATION: 95 %

## 2023-06-09 DIAGNOSIS — I35.0 NONRHEUMATIC AORTIC VALVE STENOSIS: ICD-10-CM

## 2023-06-09 DIAGNOSIS — I35.1 NONRHEUMATIC AORTIC VALVE INSUFFICIENCY: ICD-10-CM

## 2023-06-09 DIAGNOSIS — I25.119 ATHEROSCLEROSIS OF CORONARY ARTERY OF NATIVE HEART WITH ANGINA PECTORIS, UNSPECIFIED VESSEL OR LESION TYPE: Primary | ICD-10-CM

## 2023-06-09 NOTE — PROGRESS NOTES
"    [facility] Cardiothoracic Surgery  PROGRESS NOTE   CC: Miltivessel coronary disease.    Subjective:  Mr. Escamilla is an 82-year-old male who I first evaluated in the hospital just over 1 week ago. He was found to have unstable angina, underwent coronary angiography with multivessel coronary disease.    The patient reports that he has been feeling fair since being discharged from the hospital. He denies anything new happening. He denies any dyspnea or pain he had when he first showed up to the hospital. He states he has thought about surgery, but he needs more information. He notes that he does live by himself. He reports that his son lives next door to him, but he does not want to or expect him to take care of him after he has the surgery. He states that he does have issues remembering things. He thinks that it gets worse daily. He does smoke cigarettes. He reports that he has had both carotid arteries cleared out. He does not want to do the surgery if it is not absolutely necessary. He states that he smoked one pack per day and he does not plan on quitting.    ROS: No further chest pain, breathing improved    Objective:      /52 (BP Location: Left arm, Patient Position: Sitting, Cuff Size: Adult)   Pulse 54   Ht 185.4 cm (73\")   Wt 71.2 kg (157 lb)   SpO2 95%   BMI 20.71 kg/m²       PE:  Vitals:    06/09/23 1411   BP: 118/52   Pulse: 54   SpO2: 95%     GENERAL: NAD, resting comfortably, normal color  CARDIOVASCULAR: regular, regular rate, sinus  PULMONARY: Normal bilateral breath sounds, no labored breathing  ABDOMEN: soft, nontender/nondistended  EXTREMITIES: mild peripheral edema, normal pulses, normal ROM        Lab Results (last 72 hours)       ** No results found for the last 72 hours. **            Cardiac catheterization:  Findings:    Hemodynamics: Ao= 135/34, mean 71, LV= 140, LVEDP= 13, AV grad= negligible    Left ventriculography:  1. The contractility of the left " ventricle is normal. Estimated ejection fraction >65%.  2. The left ventricle is normal in wall thickness and chamber size.  3. There is no significant mitral regurgitation.    Aortic root angiography: Normal caliber vessel with 3 aortic leaflets identified. There appears to be only 1+ aortic insufficiency. Of note, this was performed with hand injected contrast so can under appreciate the true severity of aortic insufficiency.    Selective coronary angiography:   Dominance: Right    Left Main coronary artery: Medium caliber vessel arising normally from the left cusp prior to bifurcating. Moderately calcified. There is ventricularization with engagement of the Duke Catheter (5 [place]). Eccentric 50% stenosis in the mid to distal aspect of the vessel.    Left anterior descending artery: Medium to large caliber vessel arising normally from the distal left main that is moderate to severely calcified throughout the proximal to midportion. It supplies 4 diagonal branches, the first 2 of which are medium caliber in the third and fourth are small to medium caliber. There is eccentric 60% proximal LAD stenosis, then diffuse moderate disease throughout the mid vessel the remainder of the mid to distal LAD has minor luminal irregularities.    Left circumflex: Medium caliber vessel arising normally from the left main that is not dominant for posterior circulation. It supplies a medium caliber OM1, and a medium caliber OM 2/PL branch. There is moderate to severe calcification in the proximal vessel. There is also very eccentric 90% ostial stenosis as it arises from the left main. There is mild calcification in OM1 with a tubular 40% lesion in the proximal aspect of this vessel, otherwise minor luminal irregularities. As the mid circumflex extends into OM 2 there is a 85% stenosis. OM2 itself appears to be suitable a good target beyond this lesion.    Right coronary artery: Medium caliber vessel arising normally from the right  cusp that is dominant for posterior circulation. There is severe calcification at the ostium and extending throughout the right cusp, creating ventricularization of pressure with engagement of the 5 [place] catheter. There is an inferiorly directed takeoff with severe ostial stenosis (70%). The mid vessel has eccentric mild disease, with moderate calcification. The distal vessel supplies a medium to large caliber PDA and then continues beyond the crux to supply several small PL branches. The distal vessel in these branches all have minor luminal irregularities.    Estimated Blood Loss: <20mL  Specimens: None  Complications: None     Impression:  1. Severe left main plus three-vessel coronary artery disease, that does not appear amenable to percutaneous coronary intervention  2. Normal LVEF with normal LVEDP    Echocardiogram:  Interpretation Summary     Left ventricular systolic function is normal. Left ventricular ejection fraction appears to be 66 - 70%.   Calcified trileaflet aortic valve, with moderate aortic valve regurgitation and mild-moderate aortic stenosis.   The left atrial cavity is dilated.   Estimated right ventricular systolic pressure from tricuspid regurgitation is mildly elevated (35-45 mmHg).   Normal size and function of the right ventricle.   No other significant (greater than mild) valvular pathology.   No prior studies available for comparison.    I have personally reviewed, and the following is my interpretation of the coronary angiography. There is severe distal left main stenosis with diffuse calcific disease throughout the coronaries, there is proximal LAD stenosis. There is proximal disease and a large OM1 and there is disease in the circumflex between OM1 and OM2 which is also a large vessel. There is severe disease at the proximal portion of the first diagonal right coronary is dominant for the posterior circulation, there is moderate to severe proximal RCA disease with a graftable  PDA.    Assessment & Plan   Mr. Riggs is an 82-year-old male who I evaluated in the hospital after he had an episode of unstable angina he was found to have multivessel coronary disease as well as moderate AI and moderate AAS. I was asked to evaluate him for AVR CABG. He has multiple comorbidities, he has recently some increased forgetfulness and when walking through the office, he shuffles his feet, with poor coordination. He does not recall much of our conversations that happened in the hospital 1 week ago.     I discussed with him at length the natural history of his coronary disease combined with aortic valve disease as well as treatment options. We discussed what surgical treatment with AVR and CABG would entail and the postoperative as well preoperative expectations. We discussed alternative options of medical management of his valvular and coronary disease. I did calculate his patient specific STS risk and discussed this with him for surgical AVR and CABG. We agreed that he is high risk for surgical intervention. He does not want to take on that risk. For that reason, I would recommend continued medical management of his coronary disease and medical management of his valvular heart disease. He does understand the natural history with regards to this. If he changes his mind, I'm happy to see him back to further discuss, but at this time, we will stick with the plan of medical management.    Thank you for trusting me with the care of Mr. Riggs. Please do not hesitate to call with questions or concerns.      Blaine Duque MD   Cardiothoracic Surgeon    Transcribed from ambient dictation for Blaine Duque MD by Caroline Garcia.  06/09/23   17:48 CDT    Patient or patient representative verbalized consent to the visit recording.  I have personally performed the services described in this document as transcribed by the above individual, and it is both accurate and complete.

## 2023-06-09 NOTE — LETTER
June 12, 2023       No Recipients    Patient: Rajinder Riggs   YOB: 1940   Date of Visit: 6/9/2023       Dear LUCIANO Baez,    Rajinder Riggs was in my office today. Below are the relevant portions of my assessment and plan of care.    Mr. Riggs is an 82-year-old male who I evaluated in the hospital after he had an episode of unstable angina he was found to have multivessel coronary disease as well as moderate AI and moderate AAS. I was asked to evaluate him for AVR CABG. He has multiple comorbidities, he has recently some increased forgetfulness and when walking through the office, he shuffles his feet, with poor coordination. He does not recall much of our conversations that happened in the hospital 1 week ago.     I discussed with him at length the natural history of his coronary disease combined with aortic valve disease as well as treatment options. We discussed what surgical treatment with AVR and CABG would entail and the postoperative as well preoperative expectations. We discussed alternative options of medical management of his valvular and coronary disease. I did calculate his patient specific STS risk and discussed this with him for surgical AVR and CABG. We agreed that he is high risk for surgical intervention. He does not want to take on that risk. For that reason, I would recommend continued medical management of his coronary disease and medical management of his valvular heart disease. He does understand the natural history with regards to this. If he changes his mind, I'm happy to see him back to further discuss, but at this time, we will stick with the plan of medical management.    Thank you for trusting me with the care of Mr. Riggs. Please do not hesitate to call with questions or concerns.    Sincerely,        Blaine Duque MD        CC:   No Recipients

## 2023-06-12 PROBLEM — I35.1 NONRHEUMATIC AORTIC VALVE INSUFFICIENCY: Status: ACTIVE | Noted: 2023-06-12

## 2023-06-12 PROBLEM — I35.0 NONRHEUMATIC AORTIC VALVE STENOSIS: Status: ACTIVE | Noted: 2023-06-12

## 2023-06-29 PROBLEM — R94.39 ABNORMAL STRESS TEST: Status: RESOLVED | Noted: 2023-05-30 | Resolved: 2023-06-29

## 2023-06-29 PROBLEM — I20.0 UNSTABLE ANGINA PECTORIS: Status: RESOLVED | Noted: 2023-05-30 | Resolved: 2023-06-29

## 2023-08-31 ENCOUNTER — HOSPITAL ENCOUNTER (OUTPATIENT)
Dept: VASCULAR LAB | Age: 83
Discharge: HOME | End: 2023-08-31
Payer: OTHER GOVERNMENT

## 2023-08-31 DIAGNOSIS — I65.23 OCCLUSION AND STENOSIS OF BILATERAL CAROTID ARTERIES: ICD-10-CM

## 2023-08-31 PROCEDURE — 93880 EXTRACRANIAL BILAT STUDY: CPT

## 2023-09-29 ENCOUNTER — OFFICE VISIT (OUTPATIENT)
Dept: CARDIOLOGY | Facility: CLINIC | Age: 83
End: 2023-09-29
Payer: MEDICARE

## 2023-09-29 VITALS
HEART RATE: 63 BPM | SYSTOLIC BLOOD PRESSURE: 120 MMHG | OXYGEN SATURATION: 98 % | WEIGHT: 159 LBS | BODY MASS INDEX: 21.07 KG/M2 | HEIGHT: 73 IN | DIASTOLIC BLOOD PRESSURE: 40 MMHG

## 2023-09-29 DIAGNOSIS — I35.0 NONRHEUMATIC AORTIC VALVE STENOSIS: ICD-10-CM

## 2023-09-29 DIAGNOSIS — I25.119 ATHEROSCLEROSIS OF CORONARY ARTERY OF NATIVE HEART WITH ANGINA PECTORIS, UNSPECIFIED VESSEL OR LESION TYPE: Primary | ICD-10-CM

## 2023-09-29 DIAGNOSIS — I10 HTN (HYPERTENSION), BENIGN: ICD-10-CM

## 2023-09-29 DIAGNOSIS — I35.1 NONRHEUMATIC AORTIC VALVE INSUFFICIENCY: ICD-10-CM

## 2023-09-29 NOTE — PROGRESS NOTES
"     Subjective:     Encounter Date:06/29/2023      Patient ID: Rajinder Riggs is a 83 y.o. male.    Chief Complaint: Follow-up CAD    History of Present Illness    83-year-old returns today, again with his son, for further discussion of coronary artery disease.  I last saw patient, with his son as well, on 6/29/2023.  At that visit he was complaining of a rather constant chest pain but said breathing and balance were his major concerns.  By way review, he had initially presented to the hospital for slightly different form of chest discomfort, but at the last visit was describing a constant \"sense of tightness\" that worsened with activity and improved with rest.  He had been admitted for different version of chest discomfort with elevated but nontrending troponins (consistent with chronic myocardial injury) near the end of May.  His symptoms were felt to be consistent with unstable angina so I performed cardiac catheterization.  This revealed severe left main disease plus three-vessel coronary disease, and referred him to CT surgery for consideration of surgical revascularization.  Echocardiogram also noted mild to moderate aortic stenosis and moderate aortic regurgitation, so surgical aortic valve replacement was also to be considered.  Ultimately, he was seen by Dr. Duque in the office, and the patient at the time himself was not interested in cardiac surgery, and Dr. Duque also felt like he was not an acceptable candidate.  At his subsequent follow-up visit with me here on 6/29/2023, the patient seemed more open to surgery, but I have had further discussions with Dr. Duque (as noted below in the plan) and he remains an unacceptable candidate for cardiac surgery.    Returning today with his son, His complaints are slightly different.  He does not acknowledge any of the chest tightness that was constant at the last visit.  At first he says he gets out of breath walking very short distances, but then says his " "biggest problem is his legs getting weak when he walks and having to sit down to \"get my energy back enough for my legs to go.\"  When discussing his coronary disease, he asked whether this might be making him constantly dizzy, or lightheaded.    Reports weakness on way in from parking garage - due to 'weakness.'  No claudication but legs were weak, but not dyspnea or angina.  Reports constant lightheadedness.    Never got started on ranexa though [facility].      The following portions of the patient's history were reviewed and updated as appropriate: allergies, current medications, past family history, past medical history, past social history, past surgical history, and problem list.    Review of Systems   Constitutional: Positive for malaise/fatigue.   Cardiovascular:  Positive for chest pain, dyspnea on exertion and palpitations. Negative for claudication, leg swelling, near-syncope, orthopnea, paroxysmal nocturnal dyspnea and syncope.   Respiratory:  Positive for shortness of breath.    Hematologic/Lymphatic: Does not bruise/bleed easily.   Neurological:  Positive for disturbances in coordination, dizziness and headaches.         Current Outpatient Medications:     albuterol sulfate  (90 Base) MCG/ACT inhaler, Inhale 2 puffs 4 (Four) Times a Day., Disp: , Rfl:     allopurinol (ZYLOPRIM) 100 MG tablet, Take 1 tablet by mouth Daily., Disp: , Rfl:     amLODIPine (NORVASC) 10 MG tablet, Take 1 tablet by mouth Daily., Disp: , Rfl:     aspirin 81 MG EC tablet, Take 1 tablet by mouth Daily., Disp: , Rfl:     atenolol (TENORMIN) 50 MG tablet, Take 0.5 tablets by mouth Daily. Takes 1/2 tablet daily, Disp: , Rfl:     atorvastatin (LIPITOR) 40 MG tablet, Take 1 tablet by mouth Every Night., Disp: 90 tablet, Rfl: 3    bismuth subsalicylate (PEPTO BISMOL) 262 MG/15ML suspension, Take 30 mL by mouth Daily., Disp: , Rfl:     calcitriol (ROCALTROL) 0.25 MCG capsule, Take 1 capsule by " mouth Daily., Disp: , Rfl:     citalopram (CeleXA) 40 MG tablet, Take 1 tablet by mouth Daily., Disp: , Rfl:     diphenhydrAMINE (BENADRYL) 50 MG capsule, Take 1 capsule by mouth At Night As Needed for Itching or Sleep., Disp: , Rfl:     Fluticasone-Salmeterol (ADVAIR/WIXELA) 250-50 MCG/ACT DISKUS, Inhale 1 puff 2 (Two) Times a Day., Disp: , Rfl:     hydrALAZINE (APRESOLINE) 100 MG tablet, Take 1 tablet by mouth 2 (Two) Times a Day. 1 AM and 1 HS, Disp: , Rfl:     Lactobacillus (PROBIOTIC ACIDOPHILUS PO), Take 2 capsules by mouth 2 (Two) Times a Day., Disp: , Rfl:     levothyroxine (SYNTHROID, LEVOTHROID) 112 MCG tablet, Take 1 tablet by mouth Daily., Disp: , Rfl:     loratadine (CLARITIN) 10 MG tablet, Take 1 tablet by mouth Daily As Needed for Allergies., Disp: , Rfl:     losartan (COZAAR) 100 MG tablet, Take 0.5 tablets by mouth 2 (Two) Times a Day., Disp: , Rfl:     nitroglycerin (NITROSTAT) 0.4 MG SL tablet, Place 1 tablet under the tongue Every 5 (Five) Minutes As Needed for Chest Pain (Systolic BP Greater Than 100). Take no more than 3 doses in 15 minutes., Disp: 25 tablet, Rfl: 1    Omega-3 Fatty Acids (fish oil) 1000 MG capsule capsule, Take 1 capsule by mouth Daily With Breakfast., Disp: , Rfl:     pantoprazole (PROTONIX) 40 MG EC tablet, Take 1 tablet by mouth 2 (Two) Times a Day., Disp: , Rfl:     ranolazine (Ranexa) 500 MG 12 hr tablet, Take 1 tablet by mouth 2 (Two) Times a Day., Disp: 60 tablet, Rfl: 11    tamsulosin (FLOMAX) 0.4 MG capsule 24 hr capsule, Take 1 capsule by mouth Daily., Disp: 30 capsule, Rfl: 11    vitamin B-12 (CYANOCOBALAMIN) 1000 MCG tablet, Take 1 tablet by mouth Daily., Disp: , Rfl:        Objective:      Vitals:    09/29/23 1120   BP: 120/40   Pulse: 63   SpO2: 98%     Vitals and nursing note reviewed.   Constitutional:       General: Not in acute distress.     Appearance: Not in distress.   Neck:      Vascular: No JVD or JVR. JVD normal.   Pulmonary:      Effort: Pulmonary  effort is normal.      Breath sounds: Normal breath sounds.   Cardiovascular:      Normal rate. Regular rhythm.      No gallop.  No rub.   Pulses:     Intact distal pulses.   Edema:     Peripheral edema absent.   Skin:     General: Skin is warm and dry.   Neurological:      Mental Status: Alert, oriented to person, place, and time and oriented to person, place and time.       Lab Review:         ECG 12 Lead    Date/Time: 9/29/2023 11:38 AM  Performed by: You Zacarias MD  Authorized by: You Zacarias MD   Comparison: compared with previous ECG from 6/29/2023  Comparison to previous ECG: PVC now present  Rhythm: sinus rhythm  Ectopy: unifocal PVCs and atrial premature contractions  BPM: 63  Other findings: non-specific ST-T wave changes    Clinical impression: abnormal EKG        Reviewed cath films with patient and his son -there is a mid circumflex stenosis that, aside from calcification, would be amenable for a rather low complexity PCI.  High complexity lesions involve the left main bifurcation into both proximal LAD and circumflex.  Also, the ostium of the right coronary is heavily calcified and severely stenotic, which would pose high risk for dissection of the vessel upon intervention.  There is also another sequential severe stenosis further into the proximal to mid vessel.  All of these would certainly constitute a very high complexity PCI.    Results for orders placed during the hospital encounter of 05/30/23    Adult Stress Echo W/ Cont or Stress Agent if Necessary Per Protocol    Interpretation Summary    High risk stress test.    Multiple wall segments, as outlined below, appear hypokinetic to akinetic in response to dobutamine infusion.    The following left ventricular wall segments are hypokinetic: apical anterior and mid anterolateral. The following left ventricular wall segments are akinetic: apical lateral and apex.        Assessment/Plan:     Problem List Items Addressed This Visit (all  "established, stable)         Cardiac and Vasculature    HTN (hypertension), benign    Overview     cont BP medication the am of procedure         Atherosclerosis of coronary artery of native heart with angina pectoris, unspecified vessel or lesion type - Primary    Overview     [facility] 5/31/23 for UA:  Severe left main plus three-vessel coronary artery disease         Nonrheumatic aortic valve stenosis    Nonrheumatic aortic valve insufficiency     Recommendations/plans:     Patient returns today with his son.  He has not had any worsening of symptoms from a coronary standpoint since I saw him 3 months ago.  My recommendation at that point, since he voiced willingness to consider undergoing CABG, was to reconvene with Dr. Duque to readdress the possibility of CABG.  I have sent spoke with Dr. Duque, and the patient is still deemed inoperable.  Therefore, he returns today to discuss percutaneous revascularization options.    Though a rather difficult historian, he is not endorsing any angina today.  He does \"wear out easily\" and wheeze on occasion with activity, though certainly that could be from his moderately severe COPD (PFTs reviewed today).    His biggest complaint is constant lightheadedness and dizziness, and what he describes as \"brain issues.\"  He would like to have this addressed and investigated before we entertain any cardiac procedures, which I certainly think is reasonable.  His coronary disease, though very severe and advanced, is highly calcified and stable, particularly with respect to his lack of any voice symptoms in the last several months.  There have    Therefore, I advised that he remain on current medical therapy for CAD including aspirin high intensity statin.  I again reminded him of the need to quit smoking.      I asked him to check with LUCIANO Morales (to whom I will CC this note) regarding potential neurologic work-up and/or referral regarding his neurologic complaints, then asked the " patient and his son to notify me once this is completed so we can discuss scheduling outpatient PCI.    I did advise patient and his son that all the core interventions to be highly complex, likely requiring intravascular lithotripsy and/or rotational atherectomy.  As advised that, the complexity and totality of his lesions would likely require at least 2 different procedures to accomplish complete revascularization.  Though I recall crossing his aortic valve with during the diagnostic procedure in May without significant difficulty, the mild-moderate stenosis and moderate insufficiency may present a challenge in trying to place an Impella in the left ventricle, which otherwise would be advised for the left main bifurcation PCI.    Therefore, I think the optimal PCI strategy would be to start with intravascular lithotripsy and balloon/stenting of the ostial right coronary artery, as well as treating the mid and distal lesions of the RCA, and the first setting.  Pending the amount of contrast used to accomplish that, could maybe also try intervening on the mid-circumflex lesion.  Advancing equipment to that lesion may certainly prove difficult due to the angulation of circumflex origin off the left main as well as calcification in this region, but the lesion itself appears fairly straightforward.  If, by chance, we were able to accomplish all of that in 1 setting, then we could reassess his clinical response and, if still needed for symptom benefit, consider returning for even high risk PCI to the left main bifurcation lesion into the LAD and circumflex (which would require support with Impella).    51 minutes spent on day of service, high complexity [facility]    You Zacarias MD  06/29/2023  11:53 CDT

## 2023-12-29 ENCOUNTER — PREP FOR SURGERY (OUTPATIENT)
Dept: OTHER | Facility: HOSPITAL | Age: 83
End: 2023-12-29
Payer: MEDICARE

## 2023-12-29 ENCOUNTER — OFFICE VISIT (OUTPATIENT)
Dept: CARDIOLOGY | Facility: CLINIC | Age: 83
End: 2023-12-29
Payer: OTHER GOVERNMENT

## 2023-12-29 VITALS
HEART RATE: 62 BPM | OXYGEN SATURATION: 97 % | DIASTOLIC BLOOD PRESSURE: 39 MMHG | WEIGHT: 155 LBS | HEIGHT: 73 IN | SYSTOLIC BLOOD PRESSURE: 93 MMHG | BODY MASS INDEX: 20.54 KG/M2

## 2023-12-29 DIAGNOSIS — I35.0 NONRHEUMATIC AORTIC VALVE STENOSIS: ICD-10-CM

## 2023-12-29 DIAGNOSIS — I25.119 ATHEROSCLEROSIS OF CORONARY ARTERY OF NATIVE HEART WITH ANGINA PECTORIS, UNSPECIFIED VESSEL OR LESION TYPE: Primary | ICD-10-CM

## 2023-12-29 DIAGNOSIS — I10 HTN (HYPERTENSION), BENIGN: ICD-10-CM

## 2023-12-29 DIAGNOSIS — I35.1 NONRHEUMATIC AORTIC VALVE INSUFFICIENCY: ICD-10-CM

## 2023-12-29 PROCEDURE — 99214 OFFICE O/P EST MOD 30 MIN: CPT | Performed by: INTERNAL MEDICINE

## 2023-12-29 PROCEDURE — 93000 ELECTROCARDIOGRAM COMPLETE: CPT | Performed by: INTERNAL MEDICINE

## 2023-12-29 RX ORDER — SODIUM CHLORIDE 9 MG/ML
40 INJECTION, SOLUTION INTRAVENOUS AS NEEDED
OUTPATIENT
Start: 2023-12-29

## 2023-12-29 RX ORDER — SODIUM CHLORIDE 0.9 % (FLUSH) 0.9 %
10 SYRINGE (ML) INJECTION AS NEEDED
OUTPATIENT
Start: 2023-12-29

## 2023-12-29 RX ORDER — SODIUM CHLORIDE 0.9 % (FLUSH) 0.9 %
3 SYRINGE (ML) INJECTION EVERY 12 HOURS SCHEDULED
OUTPATIENT
Start: 2023-12-29

## 2023-12-29 RX ORDER — ASPIRIN 81 MG/1
81 TABLET ORAL DAILY
OUTPATIENT
Start: 2023-12-30

## 2023-12-29 RX ORDER — ASPIRIN 81 MG/1
324 TABLET, CHEWABLE ORAL ONCE
OUTPATIENT
Start: 2023-12-29 | End: 2023-12-29

## 2023-12-29 NOTE — H&P (VIEW-ONLY)
"     Subjective:     Encounter Date:12/29/2023      Patient ID: Rajinder Riggs is a 83 y.o. male.    Chief Complaint: Follow-up coronary disease    History of Present Illness    Mr. Riggs was last seen here 6 months ago. By way of review, we had initially met when he was hospitalized with unstable angina in 05/2023, and I performed heart catheterization that showed severe left main plus 3-vessel coronary disease as well as aortic valve disease. He was referred for consideration of CABG, and AVR, but ultimately it was turned down. Dr. Duque did reconsider, but the patient himself decided he was not interested in that approach. We have been employing medical therapy, but I have also offered high-risk PCI. He was here with his son at last visit on 06/29/2023, and was not complaining of any worsening symptoms from a coronary standpoint since the prior visit 3 months ago. Prior to that, he was not complaining of angina, but was \"wearing out easily\", and wheezing on occasion with activity, but certainly that could also have been from his at least moderately severe COPD. His biggest complaint at the time was lightheadedness, and dizziness, and what he described as \"brain issues.\" He wanted to have those investigated, and addressed before any coronary or cardiac procedures, which I agreed was reasonable, so I recommended continuation of medical therapy including aspirin, and high-intensity statin as well as reminding him the need to quit smoking. I asked him to check with his PCP regarding potential neurologic work-up.    Mr. Riggs comes to the clinic today accompanied by his son who contributes to his history. He reports that he would like to have the stents placed. He reports that he has a constant blank feeling in his head all the time, and is thinking if he gets the stents put in, he will get \"more blood flow up there.\" He states that he is ready to get his heart taken care of because he has trouble with " numbness in his hands, and legs. The patient reports that he has not seen anyone about his dizziness, and lightheadedness, and he has not seen LUCIANO Morales, since his last visit.     The patient reports that he is still getting weak, and worn out easily. He states it has worsened to the point that he is noticing trouble breathing. He reports that he gets short of breath, and experiences chest tightness walking in, and around the house. He states that he can walk approximately 20 feet before his symptoms occur.     The patient reports that his blood pressure fluctuates. He states that sometimes it is low, sometimes it is normal, and sometimes it is high. He states that most of the time it is normal. He reports that if he is active for a while, he notices that his blood pressure goes up. The patient states that he feels weaker, more lightheaded, and dizzier today than normal. His son reports that he went to the hospital, and they cut one of his medications because his blood pressure was too low. He reports that he is now taking atenolol 12.5 mg. He reports that he stayed in the hospital for 2 days. The patient reports that he has not had any severe spells of chest pain where he has needed to take nitroglycerin. He reports that he is taking losartan 100 mg tablet, but half of the tablet in the morning, and half of the tablet in the evening. He reports that he is on hydralazine 100 mg twice per day. He reports that he has lost weight.    The patient reports that he has not cut down on his smoking. He reports that he quit for 3 to 4 days, and then started again. He reports that he was miserable for those 3 to 4 days.      The following portions of the patient's history were reviewed and updated as appropriate: allergies, current medications, past family history, past medical history, past social history, past surgical history, and problem list.    Review of Systems   Constitutional: Positive for malaise/fatigue.    Cardiovascular:  Positive for chest pain and dyspnea on exertion. Negative for claudication, leg swelling, near-syncope, orthopnea, palpitations, paroxysmal nocturnal dyspnea and syncope.   Respiratory:  Negative for shortness of breath.    Hematologic/Lymphatic: Does not bruise/bleed easily.   Neurological:  Positive for dizziness and light-headedness.           Current Outpatient Medications:     albuterol sulfate  (90 Base) MCG/ACT inhaler, Inhale 2 puffs 4 (Four) Times a Day., Disp: , Rfl:     allopurinol (ZYLOPRIM) 100 MG tablet, Take 1 tablet by mouth Daily., Disp: , Rfl:     amLODIPine (NORVASC) 10 MG tablet, Take 1 tablet by mouth Daily., Disp: , Rfl:     aspirin 81 MG EC tablet, Take 1 tablet by mouth Daily., Disp: , Rfl:     atenolol (TENORMIN) 25 MG tablet, Take 0.5 tablets by mouth Daily., Disp: , Rfl:     atorvastatin (LIPITOR) 40 MG tablet, Take 1 tablet by mouth Every Night., Disp: 90 tablet, Rfl: 3    bismuth subsalicylate (PEPTO BISMOL) 262 MG/15ML suspension, Take 30 mL by mouth Daily., Disp: , Rfl:     calcitriol (ROCALTROL) 0.25 MCG capsule, Take 1 capsule by mouth Daily., Disp: , Rfl:     citalopram (CeleXA) 40 MG tablet, Take 1 tablet by mouth Daily., Disp: , Rfl:     diphenhydrAMINE (BENADRYL) 50 MG capsule, Take 1 capsule by mouth At Night As Needed for Itching or Sleep., Disp: , Rfl:     Fluticasone-Salmeterol (ADVAIR/WIXELA) 250-50 MCG/ACT DISKUS, Inhale 1 puff 2 (Two) Times a Day., Disp: , Rfl:     Lactobacillus (PROBIOTIC ACIDOPHILUS PO), Take 2 capsules by mouth 2 (Two) Times a Day., Disp: , Rfl:     levothyroxine (SYNTHROID, LEVOTHROID) 112 MCG tablet, Take 1 tablet by mouth Daily., Disp: , Rfl:     loratadine (CLARITIN) 10 MG tablet, Take 1 tablet by mouth Daily As Needed for Allergies., Disp: , Rfl:     losartan (COZAAR) 100 MG tablet, Take 0.5 tablets by mouth 2 (Two) Times a Day., Disp: , Rfl:     nitroglycerin (NITROSTAT) 0.4 MG SL tablet, Place 1 tablet under the tongue  Every 5 (Five) Minutes As Needed for Chest Pain (Systolic BP Greater Than 100). Take no more than 3 doses in 15 minutes., Disp: 25 tablet, Rfl: 1    Omega-3 Fatty Acids (fish oil) 1000 MG capsule capsule, Take 1 capsule by mouth Daily With Breakfast., Disp: , Rfl:     pantoprazole (PROTONIX) 40 MG EC tablet, Take 1 tablet by mouth 2 (Two) Times a Day., Disp: , Rfl:     ranolazine (Ranexa) 500 MG 12 hr tablet, Take 1 tablet by mouth 2 (Two) Times a Day., Disp: 60 tablet, Rfl: 11    tamsulosin (FLOMAX) 0.4 MG capsule 24 hr capsule, Take 1 capsule by mouth Daily., Disp: 30 capsule, Rfl: 11    vitamin B-12 (CYANOCOBALAMIN) 1000 MCG tablet, Take 1 tablet by mouth Daily., Disp: , Rfl:        Objective:      Vitals:    12/29/23 1127   BP: (!) 93/39   Pulse: 62   SpO2: 97%     Vitals and nursing note reviewed.   Constitutional:       General: Not in acute distress.     Appearance: Not in distress. Chronically ill-appearing.   Neck:      Vascular: No JVD or JVR. JVD normal.   Pulmonary:      Effort: Pulmonary effort is normal.      Breath sounds: Wheezing (diffuse) present.   Cardiovascular:      Normal rate. Regular rhythm.      Murmurs: There is a grade 2/6 systolic murmur.      No gallop.  No rub.   Pulses:     Intact distal pulses.   Edema:     Peripheral edema absent.   Skin:     General: Skin is warm and dry.   Neurological:      Mental Status: Alert, oriented to person, place, and time and oriented to person, place and time.       Lab Review:         ECG 12 Lead    Date/Time: 12/29/2023 11:39 AM  Performed by: You Zacarias MD    Authorized by: You Zacarias MD  Comparison: compared with previous ECG from 9/29/2023  Similar to previous ECG  Rhythm: sinus rhythm  Rate: normal  BPM: 62  Other findings: non-specific ST-T wave changes    Clinical impression: abnormal EKG        Reviewed films from the previous diagnostic heart catheterization again with the patient and his son, outlining the stage plan for  "multivessel PCI  Assessment/Plan:     Problem List Items Addressed This Visit (all established and stable)         Cardiac and Vasculature    HTN (hypertension), benign    Atherosclerosis of coronary artery of native heart with angina pectoris, unspecified vessel or lesion type - Primary    Overview     [facility] 5/31/23 for UA:  Severe left main plus three-vessel coronary artery disease         Nonrheumatic aortic valve stenosis    Nonrheumatic aortic valve insufficiency         Recommendations/plans:    The patient has progressively worsening anginal symptoms since last seen here 3 months ago on 09/29/2023. At that point, he was not interested in proceeding yet with detailed high-risk coronary interventions because he was more concerned about brain issues, and wanted to have neurologic workup done. I agreed with this notion, and encouraged him to discuss further with his PCP. Unfortunately, he returns today having not seen her, nor discussed this with her since that time, but now, because he says his exertional symptoms are worsening, he starts our visit by saying \"I am ready to get something done about my heart.\"     We once again reiterated previously laid out plans regarding step-by-step high-risk multivessel coronary PCI, and he would like to proceed with this as soon as possible. We will target early afternoon on 01/09/2024, and the procedure will be via femoral approach with plans for overnight observation thereafter. As laid out in my last note on 09/29/2023, we will try to intervene on the right coronary artery first using lithotripsy angioplasty to treat the heavily calcified ostium of the vessel, and anticipate that distal, mid, and ostial lesions all will ultimately be treated with stents. Pending his renal function that day, and the amount of contrast we use to accomplish the above, we may then be able to try to intervene on the mid circumflex lesion, but I doubt we would be able to safely tackle the " bifurcating distal left main lesion in the same setting.  That will likely require Impella protection in a separately scheduled procedure at a later date.    In the meantime, continue antiplatelet therapy with aspirin, and high-intensity statin. I have again encouraged him to stop smoking, and he does say that he did so for a few days since his last visit, but that he was miserable, and resumed it. He voices understanding that he knows the importance of stopping, but states he will not be able to do so.    Lastly, his blood pressure is lower today, and he does complain of lightheadedness, and dizziness at times, so I will discontinue his hydralazine. I advised that he, and his son continue to check his blood pressure at home, and expect to see a gradual rise, but if it starts to be significantly, and consistently elevated, to call us back. Otherwise, he has had issues recently with hypotension, and certainly merits this reduction. Of note, he was admitted to [facility] for 3 days for chest pain, and numbness in the extremities with the one reported change being made at that time being that of reducing his atenolol dose, and he is currently on 12.5 mg of this. We will also try to obtain records from that brief stay.    Further plans to follow intervention to the right coronary artery +/- circumflex on 01/09/2024.        Transcribed from ambient dictation for You Zacarias MD by Estefania Ferrera.  12/29/23   15:11 CST    Patient or patient representative verbalized consent to the visit recording.    I You Zacarias MD have personally performed the services described in this document as scribed by the above individual, and it is both accurate and complete.   I have edited each component as needed.    You Zacarias MD  1/1/2024  16:03 CST

## 2023-12-29 NOTE — PROGRESS NOTES
"     Subjective:     Encounter Date:12/29/2023      Patient ID: Rajinder Riggs is a 83 y.o. male.    Chief Complaint: Follow-up coronary disease    History of Present Illness    Mr. Riggs was last seen here 6 months ago. By way of review, we had initially met when he was hospitalized with unstable angina in 05/2023, and I performed heart catheterization that showed severe left main plus 3-vessel coronary disease as well as aortic valve disease. He was referred for consideration of CABG, and AVR, but ultimately it was turned down. Dr. Duque did reconsider, but the patient himself decided he was not interested in that approach. We have been employing medical therapy, but I have also offered high-risk PCI. He was here with his son at last visit on 06/29/2023, and was not complaining of any worsening symptoms from a coronary standpoint since the prior visit 3 months ago. Prior to that, he was not complaining of angina, but was \"wearing out easily\", and wheezing on occasion with activity, but certainly that could also have been from his at least moderately severe COPD. His biggest complaint at the time was lightheadedness, and dizziness, and what he described as \"brain issues.\" He wanted to have those investigated, and addressed before any coronary or cardiac procedures, which I agreed was reasonable, so I recommended continuation of medical therapy including aspirin, and high-intensity statin as well as reminding him the need to quit smoking. I asked him to check with his PCP regarding potential neurologic work-up.    Mr. Riggs comes to the clinic today accompanied by his son who contributes to his history. He reports that he would like to have the stents placed. He reports that he has a constant blank feeling in his head all the time, and is thinking if he gets the stents put in, he will get \"more blood flow up there.\" He states that he is ready to get his heart taken care of because he has trouble with " numbness in his hands, and legs. The patient reports that he has not seen anyone about his dizziness, and lightheadedness, and he has not seen LUCIANO Morales, since his last visit.     The patient reports that he is still getting weak, and worn out easily. He states it has worsened to the point that he is noticing trouble breathing. He reports that he gets short of breath, and experiences chest tightness walking in, and around the house. He states that he can walk approximately 20 feet before his symptoms occur.     The patient reports that his blood pressure fluctuates. He states that sometimes it is low, sometimes it is normal, and sometimes it is high. He states that most of the time it is normal. He reports that if he is active for a while, he notices that his blood pressure goes up. The patient states that he feels weaker, more lightheaded, and dizzier today than normal. His son reports that he went to the hospital, and they cut one of his medications because his blood pressure was too low. He reports that he is now taking atenolol 12.5 mg. He reports that he stayed in the hospital for 2 days. The patient reports that he has not had any severe spells of chest pain where he has needed to take nitroglycerin. He reports that he is taking losartan 100 mg tablet, but half of the tablet in the morning, and half of the tablet in the evening. He reports that he is on hydralazine 100 mg twice per day. He reports that he has lost weight.    The patient reports that he has not cut down on his smoking. He reports that he quit for 3 to 4 days, and then started again. He reports that he was miserable for those 3 to 4 days.      The following portions of the patient's history were reviewed and updated as appropriate: allergies, current medications, past family history, past medical history, past social history, past surgical history, and problem list.    Review of Systems   Constitutional: Positive for malaise/fatigue.    Cardiovascular:  Positive for chest pain and dyspnea on exertion. Negative for claudication, leg swelling, near-syncope, orthopnea, palpitations, paroxysmal nocturnal dyspnea and syncope.   Respiratory:  Negative for shortness of breath.    Hematologic/Lymphatic: Does not bruise/bleed easily.   Neurological:  Positive for dizziness and light-headedness.           Current Outpatient Medications:     albuterol sulfate  (90 Base) MCG/ACT inhaler, Inhale 2 puffs 4 (Four) Times a Day., Disp: , Rfl:     allopurinol (ZYLOPRIM) 100 MG tablet, Take 1 tablet by mouth Daily., Disp: , Rfl:     amLODIPine (NORVASC) 10 MG tablet, Take 1 tablet by mouth Daily., Disp: , Rfl:     aspirin 81 MG EC tablet, Take 1 tablet by mouth Daily., Disp: , Rfl:     atenolol (TENORMIN) 25 MG tablet, Take 0.5 tablets by mouth Daily., Disp: , Rfl:     atorvastatin (LIPITOR) 40 MG tablet, Take 1 tablet by mouth Every Night., Disp: 90 tablet, Rfl: 3    bismuth subsalicylate (PEPTO BISMOL) 262 MG/15ML suspension, Take 30 mL by mouth Daily., Disp: , Rfl:     calcitriol (ROCALTROL) 0.25 MCG capsule, Take 1 capsule by mouth Daily., Disp: , Rfl:     citalopram (CeleXA) 40 MG tablet, Take 1 tablet by mouth Daily., Disp: , Rfl:     diphenhydrAMINE (BENADRYL) 50 MG capsule, Take 1 capsule by mouth At Night As Needed for Itching or Sleep., Disp: , Rfl:     Fluticasone-Salmeterol (ADVAIR/WIXELA) 250-50 MCG/ACT DISKUS, Inhale 1 puff 2 (Two) Times a Day., Disp: , Rfl:     Lactobacillus (PROBIOTIC ACIDOPHILUS PO), Take 2 capsules by mouth 2 (Two) Times a Day., Disp: , Rfl:     levothyroxine (SYNTHROID, LEVOTHROID) 112 MCG tablet, Take 1 tablet by mouth Daily., Disp: , Rfl:     loratadine (CLARITIN) 10 MG tablet, Take 1 tablet by mouth Daily As Needed for Allergies., Disp: , Rfl:     losartan (COZAAR) 100 MG tablet, Take 0.5 tablets by mouth 2 (Two) Times a Day., Disp: , Rfl:     nitroglycerin (NITROSTAT) 0.4 MG SL tablet, Place 1 tablet under the tongue  Every 5 (Five) Minutes As Needed for Chest Pain (Systolic BP Greater Than 100). Take no more than 3 doses in 15 minutes., Disp: 25 tablet, Rfl: 1    Omega-3 Fatty Acids (fish oil) 1000 MG capsule capsule, Take 1 capsule by mouth Daily With Breakfast., Disp: , Rfl:     pantoprazole (PROTONIX) 40 MG EC tablet, Take 1 tablet by mouth 2 (Two) Times a Day., Disp: , Rfl:     ranolazine (Ranexa) 500 MG 12 hr tablet, Take 1 tablet by mouth 2 (Two) Times a Day., Disp: 60 tablet, Rfl: 11    tamsulosin (FLOMAX) 0.4 MG capsule 24 hr capsule, Take 1 capsule by mouth Daily., Disp: 30 capsule, Rfl: 11    vitamin B-12 (CYANOCOBALAMIN) 1000 MCG tablet, Take 1 tablet by mouth Daily., Disp: , Rfl:        Objective:      Vitals:    12/29/23 1127   BP: (!) 93/39   Pulse: 62   SpO2: 97%     Vitals and nursing note reviewed.   Constitutional:       General: Not in acute distress.     Appearance: Not in distress. Chronically ill-appearing.   Neck:      Vascular: No JVD or JVR. JVD normal.   Pulmonary:      Effort: Pulmonary effort is normal.      Breath sounds: Wheezing (diffuse) present.   Cardiovascular:      Normal rate. Regular rhythm.      Murmurs: There is a grade 2/6 systolic murmur.      No gallop.  No rub.   Pulses:     Intact distal pulses.   Edema:     Peripheral edema absent.   Skin:     General: Skin is warm and dry.   Neurological:      Mental Status: Alert, oriented to person, place, and time and oriented to person, place and time.       Lab Review:         ECG 12 Lead    Date/Time: 12/29/2023 11:39 AM  Performed by: You Zacarias MD    Authorized by: You Zacarias MD  Comparison: compared with previous ECG from 9/29/2023  Similar to previous ECG  Rhythm: sinus rhythm  Rate: normal  BPM: 62  Other findings: non-specific ST-T wave changes    Clinical impression: abnormal EKG        Reviewed films from the previous diagnostic heart catheterization again with the patient and his son, outlining the stage plan for  "multivessel PCI  Assessment/Plan:     Problem List Items Addressed This Visit (all established and stable)         Cardiac and Vasculature    HTN (hypertension), benign    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 At that point, he was not interested in proceeding yet with detailed high-risk coronary interventions because he was more concerned about brain issues, and wanted to have neurologic workup done. I agreed with this notion, and encouraged him to discuss further with his PCP. Unfortunately, he returns today having not seen her, nor discussed this with her since that time, but now, because he says his exertional symptoms are worsening, he starts our visit by saying \"I am ready to get something done about my heart.\"     We once again reiterated previously laid out plans regarding step-by-step high-risk multivessel coronary PCI, and he would like to proceed with this as soon as possible. We will target early afternoon on 01/09/2024, and the procedure will be via femoral approach with plans for overnight observation thereafter. As laid out in my last note on 09/29/2023, we will try to intervene on the right coronary artery first using lithotripsy angioplasty to treat the heavily calcified ostium of the vessel, and anticipate that distal, mid, and ostial lesions all will ultimately be treated with stents. Pending his renal function that day, and the amount of contrast we use to accomplish the above, we may then be able to try to intervene on the mid circumflex lesion, but I doubt we would be able to safely tackle the " bifurcating distal left main lesion in the same setting.  That will likely require Impella protection in a separately scheduled procedure at a later date.    In the meantime, continue antiplatelet therapy with aspirin, and high-intensity statin. I have again encouraged him to stop smoking, and he does say that he did so for a few days since his last visit, but that he was miserable, and resumed it. He voices understanding that he knows the importance of stopping, but states he will not be able to do so.    Lastly, his blood pressure is lower today, and he does complain of lightheadedness, and dizziness at times, so I will discontinue his hydralazine. I advised that he, and his son continue to check his blood pressure at home, and expect to see a gradual rise, but if it starts to be significantly, and consistently elevated, to call us back. Otherwise, he has had issues recently with hypotension, and certainly merits this reduction. Of note, he was admitted to [facility] for 3 days for chest pain, and numbness in the extremities with the one reported change being made at that time being that of reducing his atenolol dose, and he is currently on 12.5 mg of this. We will also try to obtain records from that brief stay.    Further plans to follow intervention to the right coronary artery +/- circumflex on 01/09/2024.        Transcribed from ambient dictation for You Zacarias MD by Estefania Ferrera.  12/29/23   15:11 CST    Patient or patient representative verbalized consent to the visit recording.    I You Zacarias MD have personally performed the services described in this document as scribed by the above individual, and it is both accurate and complete.   I have edited each component as needed.    You Zacarias MD  1/1/2024  16:03 CST

## 2024-01-09 ENCOUNTER — HOSPITAL ENCOUNTER (OUTPATIENT)
Facility: HOSPITAL | Age: 84
Discharge: HOME | End: 2024-01-10
Attending: INTERNAL MEDICINE | Admitting: INTERNAL MEDICINE
Payer: OTHER GOVERNMENT

## 2024-01-09 DIAGNOSIS — I25.119 ATHEROSCLEROSIS OF CORONARY ARTERY OF NATIVE HEART WITH ANGINA PECTORIS, UNSPECIFIED VESSEL OR LESION TYPE: ICD-10-CM

## 2024-01-09 PROBLEM — Z95.5 S/P DRUG ELUTING CORONARY STENT PLACEMENT: Status: ACTIVE | Noted: 2024-01-09

## 2024-01-09 LAB
ANION GAP SERPL CALCULATED.3IONS-SCNC: 9 MMOL/L (ref 5–15)
BUN SERPL-MCNC: 38 MG/DL (ref 8–23)
BUN/CREAT SERPL: 24.1 (ref 7–25)
CALCIUM SPEC-SCNC: 8.9 MG/DL (ref 8.6–10.5)
CHLORIDE SERPL-SCNC: 101 MMOL/L (ref 98–107)
CO2 SERPL-SCNC: 27 MMOL/L (ref 22–29)
CREAT SERPL-MCNC: 1.58 MG/DL (ref 0.76–1.27)
DEPRECATED RDW RBC AUTO: 63.6 FL (ref 37–54)
EGFRCR SERPLBLD CKD-EPI 2021: 43.1 ML/MIN/1.73
ERYTHROCYTE [DISTWIDTH] IN BLOOD BY AUTOMATED COUNT: 18.8 % (ref 12.3–15.4)
GLUCOSE SERPL-MCNC: 96 MG/DL (ref 65–99)
HCT VFR BLD AUTO: 29.9 % (ref 37.5–51)
HGB BLD-MCNC: 9.5 G/DL (ref 13–17.7)
MCH RBC QN AUTO: 30.1 PG (ref 26.6–33)
MCHC RBC AUTO-ENTMCNC: 31.8 G/DL (ref 31.5–35.7)
MCV RBC AUTO: 94.6 FL (ref 79–97)
PLATELET # BLD AUTO: 211 10*3/MM3 (ref 140–450)
PMV BLD AUTO: 10.2 FL (ref 6–12)
POTASSIUM SERPL-SCNC: 4.1 MMOL/L (ref 3.5–5.2)
RBC # BLD AUTO: 3.16 10*6/MM3 (ref 4.14–5.8)
SODIUM SERPL-SCNC: 137 MMOL/L (ref 136–145)
WBC NRBC COR # BLD AUTO: 8.9 10*3/MM3 (ref 3.4–10.8)

## 2024-01-09 PROCEDURE — C1761: HCPCS | Performed by: INTERNAL MEDICINE

## 2024-01-09 PROCEDURE — C1769 GUIDE WIRE: HCPCS | Performed by: INTERNAL MEDICINE

## 2024-01-09 PROCEDURE — 93458 L HRT ARTERY/VENTRICLE ANGIO: CPT | Performed by: INTERNAL MEDICINE

## 2024-01-09 PROCEDURE — 25010000002 NITROGLYCERIN 200 MCG/ML SOLUTION: Performed by: INTERNAL MEDICINE

## 2024-01-09 PROCEDURE — C1760 CLOSURE DEV, VASC: HCPCS | Performed by: INTERNAL MEDICINE

## 2024-01-09 PROCEDURE — 63710000001 BUDESONIDE-FORMOTEROL 160-4.5 MCG/ACT AEROSOL 10.2 G INHALER: Performed by: INTERNAL MEDICINE

## 2024-01-09 PROCEDURE — 25010000002 FENTANYL CITRATE (PF) 50 MCG/ML SOLUTION: Performed by: INTERNAL MEDICINE

## 2024-01-09 PROCEDURE — 25010000002 HEPARIN (PORCINE) 1000-0.9 UT/500ML-% SOLUTION: Performed by: INTERNAL MEDICINE

## 2024-01-09 PROCEDURE — 25010000002 HEPARIN (PORCINE) PER 1000 UNITS: Performed by: INTERNAL MEDICINE

## 2024-01-09 PROCEDURE — C1725 CATH, TRANSLUMIN NON-LASER: HCPCS | Performed by: INTERNAL MEDICINE

## 2024-01-09 PROCEDURE — 25010000002 DIPHENHYDRAMINE PER 50 MG: Performed by: INTERNAL MEDICINE

## 2024-01-09 PROCEDURE — 92972 PERQ TRLUML CORONRY LITHOTRP: CPT | Performed by: INTERNAL MEDICINE

## 2024-01-09 PROCEDURE — 99152 MOD SED SAME PHYS/QHP 5/>YRS: CPT | Performed by: INTERNAL MEDICINE

## 2024-01-09 PROCEDURE — C1874 STENT, COATED/COV W/DEL SYS: HCPCS | Performed by: INTERNAL MEDICINE

## 2024-01-09 PROCEDURE — C1887 CATHETER, GUIDING: HCPCS | Performed by: INTERNAL MEDICINE

## 2024-01-09 PROCEDURE — 63710000001 TICAGRELOR 90 MG TABLET: Performed by: INTERNAL MEDICINE

## 2024-01-09 PROCEDURE — 63710000001 RANOLAZINE 500 MG TABLET SUSTAINED-RELEASE 12 HOUR: Performed by: INTERNAL MEDICINE

## 2024-01-09 PROCEDURE — A9270 NON-COVERED ITEM OR SERVICE: HCPCS | Performed by: INTERNAL MEDICINE

## 2024-01-09 PROCEDURE — 63710000001 PANTOPRAZOLE 40 MG TABLET DELAYED-RELEASE: Performed by: INTERNAL MEDICINE

## 2024-01-09 PROCEDURE — 80048 BASIC METABOLIC PNL TOTAL CA: CPT | Performed by: INTERNAL MEDICINE

## 2024-01-09 PROCEDURE — 25810000003 SODIUM CHLORIDE 0.9 % SOLUTION: Performed by: INTERNAL MEDICINE

## 2024-01-09 PROCEDURE — 25010000002 MIDAZOLAM PER 1 MG: Performed by: INTERNAL MEDICINE

## 2024-01-09 PROCEDURE — C1894 INTRO/SHEATH, NON-LASER: HCPCS | Performed by: INTERNAL MEDICINE

## 2024-01-09 PROCEDURE — 85347 COAGULATION TIME ACTIVATED: CPT

## 2024-01-09 PROCEDURE — 25010000002 HEPARIN (PORCINE) 2000-0.9 UNIT/L-% SOLUTION: Performed by: INTERNAL MEDICINE

## 2024-01-09 PROCEDURE — 85027 COMPLETE CBC AUTOMATED: CPT | Performed by: INTERNAL MEDICINE

## 2024-01-09 PROCEDURE — 92928 PRQ TCAT PLMT NTRAC ST 1 LES: CPT | Performed by: INTERNAL MEDICINE

## 2024-01-09 PROCEDURE — 99153 MOD SED SAME PHYS/QHP EA: CPT | Performed by: INTERNAL MEDICINE

## 2024-01-09 PROCEDURE — 25510000001 IOPAMIDOL 61 % SOLUTION: Performed by: INTERNAL MEDICINE

## 2024-01-09 PROCEDURE — C9600 PERC DRUG-EL COR STENT SING: HCPCS | Performed by: INTERNAL MEDICINE

## 2024-01-09 PROCEDURE — 94640 AIRWAY INHALATION TREATMENT: CPT

## 2024-01-09 PROCEDURE — 63710000001 ATORVASTATIN 40 MG TABLET: Performed by: INTERNAL MEDICINE

## 2024-01-09 PROCEDURE — 63710000001 LOSARTAN 50 MG TABLET: Performed by: INTERNAL MEDICINE

## 2024-01-09 DEVICE — XIENCE SKYPOINT™ EVEROLIMUS ELUTING CORONARY STENT SYSTEM 2.50 MM X 12 MM / RAPID-EXCHANGE
Type: IMPLANTABLE DEVICE | Site: CORONARY | Status: FUNCTIONAL
Brand: XIENCE SKYPOINT™

## 2024-01-09 DEVICE — XIENCE SKYPOINT™ EVEROLIMUS ELUTING CORONARY STENT SYSTEM 3.25 MM X 15 MM / RAPID-EXCHANGE
Type: IMPLANTABLE DEVICE | Site: CORONARY | Status: FUNCTIONAL
Brand: XIENCE SKYPOINT™

## 2024-01-09 DEVICE — XIENCE SKYPOINT™ EVEROLIMUS ELUTING CORONARY STENT SYSTEM 2.50 MM X 33 MM / RAPID-EXCHANGE
Type: IMPLANTABLE DEVICE | Site: CORONARY | Status: FUNCTIONAL
Brand: XIENCE SKYPOINT™

## 2024-01-09 RX ORDER — AMLODIPINE BESYLATE 10 MG/1
10 TABLET ORAL DAILY
Status: DISCONTINUED | OUTPATIENT
Start: 2024-01-10 | End: 2024-01-10 | Stop reason: HOSPADM

## 2024-01-09 RX ORDER — ACETAMINOPHEN 325 MG/1
650 TABLET ORAL EVERY 4 HOURS PRN
Status: DISCONTINUED | OUTPATIENT
Start: 2024-01-09 | End: 2024-01-10 | Stop reason: HOSPADM

## 2024-01-09 RX ORDER — RANOLAZINE 500 MG/1
500 TABLET, EXTENDED RELEASE ORAL 2 TIMES DAILY
Status: DISCONTINUED | OUTPATIENT
Start: 2024-01-09 | End: 2024-01-10 | Stop reason: HOSPADM

## 2024-01-09 RX ORDER — DIPHENHYDRAMINE HYDROCHLORIDE 50 MG/ML
INJECTION INTRAMUSCULAR; INTRAVENOUS
Status: DISCONTINUED | OUTPATIENT
Start: 2024-01-09 | End: 2024-01-09 | Stop reason: HOSPADM

## 2024-01-09 RX ORDER — FENTANYL CITRATE 50 UG/ML
INJECTION, SOLUTION INTRAMUSCULAR; INTRAVENOUS
Status: DISCONTINUED | OUTPATIENT
Start: 2024-01-09 | End: 2024-01-09 | Stop reason: HOSPADM

## 2024-01-09 RX ORDER — ALLOPURINOL 100 MG/1
100 TABLET ORAL DAILY
Status: DISCONTINUED | OUTPATIENT
Start: 2024-01-10 | End: 2024-01-10 | Stop reason: HOSPADM

## 2024-01-09 RX ORDER — ASPIRIN 81 MG/1
TABLET, CHEWABLE ORAL
Status: COMPLETED
Start: 2024-01-09 | End: 2024-01-09

## 2024-01-09 RX ORDER — MIDAZOLAM HYDROCHLORIDE 1 MG/ML
INJECTION INTRAMUSCULAR; INTRAVENOUS
Status: DISCONTINUED | OUTPATIENT
Start: 2024-01-09 | End: 2024-01-09 | Stop reason: HOSPADM

## 2024-01-09 RX ORDER — ALBUTEROL SULFATE 2.5 MG/3ML
2.5 SOLUTION RESPIRATORY (INHALATION) EVERY 6 HOURS PRN
Status: DISCONTINUED | OUTPATIENT
Start: 2024-01-09 | End: 2024-01-10 | Stop reason: HOSPADM

## 2024-01-09 RX ORDER — SODIUM CHLORIDE 0.9 % (FLUSH) 0.9 %
10 SYRINGE (ML) INJECTION AS NEEDED
Status: DISCONTINUED | OUTPATIENT
Start: 2024-01-09 | End: 2024-01-09 | Stop reason: HOSPADM

## 2024-01-09 RX ORDER — NITROGLYCERIN 0.4 MG/1
0.4 TABLET SUBLINGUAL
Status: DISCONTINUED | OUTPATIENT
Start: 2024-01-09 | End: 2024-01-10 | Stop reason: HOSPADM

## 2024-01-09 RX ORDER — SODIUM CHLORIDE 9 MG/ML
40 INJECTION, SOLUTION INTRAVENOUS AS NEEDED
Status: DISCONTINUED | OUTPATIENT
Start: 2024-01-09 | End: 2024-01-09 | Stop reason: HOSPADM

## 2024-01-09 RX ORDER — ASPIRIN 81 MG/1
324 TABLET, CHEWABLE ORAL ONCE
Status: DISCONTINUED | OUTPATIENT
Start: 2024-01-09 | End: 2024-01-09

## 2024-01-09 RX ORDER — ASPIRIN 81 MG/1
81 TABLET ORAL DAILY
Status: DISCONTINUED | OUTPATIENT
Start: 2024-01-10 | End: 2024-01-10 | Stop reason: HOSPADM

## 2024-01-09 RX ORDER — TAMSULOSIN HYDROCHLORIDE 0.4 MG/1
0.4 CAPSULE ORAL DAILY
Status: DISCONTINUED | OUTPATIENT
Start: 2024-01-10 | End: 2024-01-10 | Stop reason: HOSPADM

## 2024-01-09 RX ORDER — LEVOTHYROXINE SODIUM 112 UG/1
112 TABLET ORAL
Status: DISCONTINUED | OUTPATIENT
Start: 2024-01-10 | End: 2024-01-10 | Stop reason: HOSPADM

## 2024-01-09 RX ORDER — PANTOPRAZOLE SODIUM 40 MG/1
40 TABLET, DELAYED RELEASE ORAL 2 TIMES DAILY
Status: DISCONTINUED | OUTPATIENT
Start: 2024-01-09 | End: 2024-01-10 | Stop reason: HOSPADM

## 2024-01-09 RX ORDER — SODIUM CHLORIDE 0.9 % (FLUSH) 0.9 %
3 SYRINGE (ML) INJECTION EVERY 12 HOURS SCHEDULED
Status: DISCONTINUED | OUTPATIENT
Start: 2024-01-09 | End: 2024-01-09 | Stop reason: HOSPADM

## 2024-01-09 RX ORDER — HEPARIN SODIUM 200 [USP'U]/100ML
INJECTION, SOLUTION INTRAVENOUS
Status: DISCONTINUED | OUTPATIENT
Start: 2024-01-09 | End: 2024-01-09 | Stop reason: HOSPADM

## 2024-01-09 RX ORDER — NITROGLYCERIN 20 MG/100ML
INJECTION INTRAVENOUS
Status: COMPLETED | OUTPATIENT
Start: 2024-01-09 | End: 2024-01-09

## 2024-01-09 RX ORDER — LIDOCAINE HYDROCHLORIDE 20 MG/ML
INJECTION, SOLUTION INFILTRATION; PERINEURAL
Status: DISCONTINUED | OUTPATIENT
Start: 2024-01-09 | End: 2024-01-09 | Stop reason: HOSPADM

## 2024-01-09 RX ORDER — ASPIRIN 81 MG/1
243 TABLET, CHEWABLE ORAL ONCE
Status: COMPLETED | OUTPATIENT
Start: 2024-01-09 | End: 2024-01-09

## 2024-01-09 RX ORDER — SODIUM CHLORIDE 9 MG/ML
100 INJECTION, SOLUTION INTRAVENOUS CONTINUOUS
Status: DISPENSED | OUTPATIENT
Start: 2024-01-09 | End: 2024-01-09

## 2024-01-09 RX ORDER — CITALOPRAM 20 MG/1
40 TABLET ORAL DAILY
Status: DISCONTINUED | OUTPATIENT
Start: 2024-01-10 | End: 2024-01-10 | Stop reason: HOSPADM

## 2024-01-09 RX ORDER — ASPIRIN 81 MG/1
81 TABLET ORAL DAILY
Status: DISCONTINUED | OUTPATIENT
Start: 2024-01-10 | End: 2024-01-09

## 2024-01-09 RX ORDER — BUDESONIDE AND FORMOTEROL FUMARATE DIHYDRATE 160; 4.5 UG/1; UG/1
2 AEROSOL RESPIRATORY (INHALATION)
Status: DISCONTINUED | OUTPATIENT
Start: 2024-01-09 | End: 2024-01-10 | Stop reason: HOSPADM

## 2024-01-09 RX ORDER — HEPARIN SODIUM 1000 [USP'U]/ML
INJECTION, SOLUTION INTRAVENOUS; SUBCUTANEOUS
Status: DISCONTINUED | OUTPATIENT
Start: 2024-01-09 | End: 2024-01-09 | Stop reason: HOSPADM

## 2024-01-09 RX ORDER — ATORVASTATIN CALCIUM 40 MG/1
40 TABLET, FILM COATED ORAL NIGHTLY
Status: DISCONTINUED | OUTPATIENT
Start: 2024-01-09 | End: 2024-01-10 | Stop reason: HOSPADM

## 2024-01-09 RX ORDER — LOSARTAN POTASSIUM 50 MG/1
50 TABLET ORAL 2 TIMES DAILY
Status: DISCONTINUED | OUTPATIENT
Start: 2024-01-09 | End: 2024-01-10 | Stop reason: HOSPADM

## 2024-01-09 RX ORDER — ATENOLOL 25 MG/1
12.5 TABLET ORAL DAILY
Status: DISCONTINUED | OUTPATIENT
Start: 2024-01-10 | End: 2024-01-10 | Stop reason: HOSPADM

## 2024-01-09 RX ORDER — DIPHENHYDRAMINE HCL 25 MG
50 CAPSULE ORAL NIGHTLY PRN
Status: DISCONTINUED | OUTPATIENT
Start: 2024-01-09 | End: 2024-01-10 | Stop reason: HOSPADM

## 2024-01-09 RX ADMIN — RANOLAZINE 500 MG: 500 TABLET, FILM COATED, EXTENDED RELEASE ORAL at 21:45

## 2024-01-09 RX ADMIN — BUDESONIDE AND FORMOTEROL FUMARATE DIHYDRATE 2 PUFF: 160; 4.5 AEROSOL RESPIRATORY (INHALATION) at 20:29

## 2024-01-09 RX ADMIN — TICAGRELOR 90 MG: 90 TABLET ORAL at 23:01

## 2024-01-09 RX ADMIN — TICAGRELOR 180 MG: 90 TABLET ORAL at 11:52

## 2024-01-09 RX ADMIN — ASPIRIN 243 MG: 81 TABLET, CHEWABLE ORAL at 11:53

## 2024-01-09 RX ADMIN — PANTOPRAZOLE SODIUM 40 MG: 40 TABLET, DELAYED RELEASE ORAL at 21:45

## 2024-01-09 RX ADMIN — SODIUM CHLORIDE 210.9 ML: 9 INJECTION, SOLUTION INTRAVENOUS at 11:44

## 2024-01-09 RX ADMIN — ATORVASTATIN CALCIUM 40 MG: 40 TABLET ORAL at 21:45

## 2024-01-09 RX ADMIN — LOSARTAN POTASSIUM 50 MG: 50 TABLET, FILM COATED ORAL at 21:45

## 2024-01-09 RX ADMIN — SODIUM CHLORIDE 100 ML/HR: 9 INJECTION, SOLUTION INTRAVENOUS at 18:39

## 2024-01-09 NOTE — Clinical Note
Second inflation of balloon - Max pressure = 12 linda. 2nd Inflation of balloon - Duration = 30 seconds.

## 2024-01-10 VITALS
DIASTOLIC BLOOD PRESSURE: 50 MMHG | HEART RATE: 56 BPM | RESPIRATION RATE: 16 BRPM | OXYGEN SATURATION: 94 % | WEIGHT: 154.4 LBS | HEIGHT: 73 IN | SYSTOLIC BLOOD PRESSURE: 148 MMHG | BODY MASS INDEX: 20.46 KG/M2 | TEMPERATURE: 97.8 F

## 2024-01-10 LAB
ANION GAP SERPL CALCULATED.3IONS-SCNC: 7 MMOL/L (ref 5–15)
BUN SERPL-MCNC: 30 MG/DL (ref 8–23)
BUN/CREAT SERPL: 22.4 (ref 7–25)
CALCIUM SPEC-SCNC: 8.9 MG/DL (ref 8.6–10.5)
CHLORIDE SERPL-SCNC: 104 MMOL/L (ref 98–107)
CHOLEST SERPL-MCNC: 90 MG/DL (ref 0–200)
CO2 SERPL-SCNC: 28 MMOL/L (ref 22–29)
CREAT SERPL-MCNC: 1.34 MG/DL (ref 0.76–1.27)
DEPRECATED RDW RBC AUTO: 63.4 FL (ref 37–54)
EGFRCR SERPLBLD CKD-EPI 2021: 52.6 ML/MIN/1.73
ERYTHROCYTE [DISTWIDTH] IN BLOOD BY AUTOMATED COUNT: 19.2 % (ref 12.3–15.4)
GLUCOSE SERPL-MCNC: 89 MG/DL (ref 65–99)
HBA1C MFR BLD: 5.2 % (ref 4.8–5.6)
HCT VFR BLD AUTO: 27.3 % (ref 37.5–51)
HDLC SERPL-MCNC: 33 MG/DL (ref 40–60)
HGB BLD-MCNC: 8.7 G/DL (ref 13–17.7)
LDLC SERPL CALC-MCNC: 42 MG/DL (ref 0–100)
LDLC/HDLC SERPL: 1.32 {RATIO}
MCH RBC QN AUTO: 29.9 PG (ref 26.6–33)
MCHC RBC AUTO-ENTMCNC: 31.9 G/DL (ref 31.5–35.7)
MCV RBC AUTO: 93.8 FL (ref 79–97)
PLATELET # BLD AUTO: 209 10*3/MM3 (ref 140–450)
PMV BLD AUTO: 11.5 FL (ref 6–12)
POTASSIUM SERPL-SCNC: 3.5 MMOL/L (ref 3.5–5.2)
RBC # BLD AUTO: 2.91 10*6/MM3 (ref 4.14–5.8)
SODIUM SERPL-SCNC: 139 MMOL/L (ref 136–145)
TRIGL SERPL-MCNC: 68 MG/DL (ref 0–150)
VLDLC SERPL-MCNC: 15 MG/DL (ref 5–40)
WBC NRBC COR # BLD AUTO: 9.84 10*3/MM3 (ref 3.4–10.8)

## 2024-01-10 PROCEDURE — 63710000001 ASPIRIN 81 MG TABLET DELAYED-RELEASE: Performed by: INTERNAL MEDICINE

## 2024-01-10 PROCEDURE — 94664 DEMO&/EVAL PT USE INHALER: CPT

## 2024-01-10 PROCEDURE — 63710000001 CITALOPRAM 20 MG TABLET: Performed by: INTERNAL MEDICINE

## 2024-01-10 PROCEDURE — 63710000001 AMLODIPINE 10 MG TABLET: Performed by: INTERNAL MEDICINE

## 2024-01-10 PROCEDURE — 63710000001 TICAGRELOR 90 MG TABLET: Performed by: INTERNAL MEDICINE

## 2024-01-10 PROCEDURE — 63710000001 ATENOLOL 25 MG TABLET: Performed by: INTERNAL MEDICINE

## 2024-01-10 PROCEDURE — A9270 NON-COVERED ITEM OR SERVICE: HCPCS | Performed by: INTERNAL MEDICINE

## 2024-01-10 PROCEDURE — 63710000001 TAMSULOSIN 0.4 MG CAPSULE: Performed by: INTERNAL MEDICINE

## 2024-01-10 PROCEDURE — 63710000001 RANOLAZINE 500 MG TABLET SUSTAINED-RELEASE 12 HOUR: Performed by: INTERNAL MEDICINE

## 2024-01-10 PROCEDURE — 80061 LIPID PANEL: CPT | Performed by: INTERNAL MEDICINE

## 2024-01-10 PROCEDURE — 63710000001 DIPHENHYDRAMINE PER 50 MG: Performed by: INTERNAL MEDICINE

## 2024-01-10 PROCEDURE — 83036 HEMOGLOBIN GLYCOSYLATED A1C: CPT | Performed by: INTERNAL MEDICINE

## 2024-01-10 PROCEDURE — 94799 UNLISTED PULMONARY SVC/PX: CPT

## 2024-01-10 PROCEDURE — 63710000001 LEVOTHYROXINE 112 MCG TABLET: Performed by: INTERNAL MEDICINE

## 2024-01-10 PROCEDURE — 80048 BASIC METABOLIC PNL TOTAL CA: CPT | Performed by: INTERNAL MEDICINE

## 2024-01-10 PROCEDURE — 63710000001 ALLOPURINOL 100 MG TABLET: Performed by: INTERNAL MEDICINE

## 2024-01-10 PROCEDURE — 94761 N-INVAS EAR/PLS OXIMETRY MLT: CPT

## 2024-01-10 PROCEDURE — 63710000001 LOSARTAN 50 MG TABLET: Performed by: INTERNAL MEDICINE

## 2024-01-10 PROCEDURE — 63710000001 ACETAMINOPHEN 325 MG TABLET: Performed by: INTERNAL MEDICINE

## 2024-01-10 PROCEDURE — 99239 HOSP IP/OBS DSCHRG MGMT >30: CPT | Performed by: NURSE PRACTITIONER

## 2024-01-10 PROCEDURE — 85027 COMPLETE CBC AUTOMATED: CPT | Performed by: INTERNAL MEDICINE

## 2024-01-10 PROCEDURE — 63710000001 PANTOPRAZOLE 40 MG TABLET DELAYED-RELEASE: Performed by: INTERNAL MEDICINE

## 2024-01-10 RX ORDER — ATENOLOL 50 MG/1
25 TABLET ORAL DAILY
COMMUNITY

## 2024-01-10 RX ORDER — ATORVASTATIN CALCIUM 80 MG/1
40 TABLET, FILM COATED ORAL DAILY
COMMUNITY

## 2024-01-10 RX ADMIN — LOSARTAN POTASSIUM 50 MG: 50 TABLET, FILM COATED ORAL at 08:16

## 2024-01-10 RX ADMIN — ALLOPURINOL 100 MG: 100 TABLET ORAL at 08:16

## 2024-01-10 RX ADMIN — ATENOLOL 12.5 MG: 25 TABLET ORAL at 08:16

## 2024-01-10 RX ADMIN — CITALOPRAM 40 MG: 20 TABLET, FILM COATED ORAL at 08:16

## 2024-01-10 RX ADMIN — TAMSULOSIN HYDROCHLORIDE 0.4 MG: 0.4 CAPSULE ORAL at 08:16

## 2024-01-10 RX ADMIN — BUDESONIDE AND FORMOTEROL FUMARATE DIHYDRATE 2 PUFF: 160; 4.5 AEROSOL RESPIRATORY (INHALATION) at 07:11

## 2024-01-10 RX ADMIN — ACETAMINOPHEN 650 MG: 325 TABLET, FILM COATED ORAL at 01:09

## 2024-01-10 RX ADMIN — DIPHENHYDRAMINE HYDROCHLORIDE 50 MG: 25 CAPSULE ORAL at 01:08

## 2024-01-10 RX ADMIN — RANOLAZINE 500 MG: 500 TABLET, FILM COATED, EXTENDED RELEASE ORAL at 08:16

## 2024-01-10 RX ADMIN — AMLODIPINE BESYLATE 10 MG: 10 TABLET ORAL at 08:16

## 2024-01-10 RX ADMIN — ASPIRIN 81 MG: 81 TABLET, COATED ORAL at 08:17

## 2024-01-10 RX ADMIN — PANTOPRAZOLE SODIUM 40 MG: 40 TABLET, DELAYED RELEASE ORAL at 08:16

## 2024-01-10 RX ADMIN — LEVOTHYROXINE SODIUM 112 MCG: 112 TABLET ORAL at 05:45

## 2024-01-10 RX ADMIN — TICAGRELOR 90 MG: 90 TABLET ORAL at 08:16

## 2024-01-10 NOTE — DISCHARGE INSTRUCTIONS
Activity at Discharge:   No heavy lifting for 5-7 days greater than 10 pounds.  No heavy or strenuous pushing or pulling.  No tub baths, hot tubs, swimming pools, or submerging groin underwater for 1 week.  Wash groin site daily with antibacterial soap and water. Rinse and keep clean and dry.  No lotions, powders, or topical ointments to site. Keep open to air and dry.  Call our office with any concerns or if you notice redness, swelling, drainage, warmth, or pain at the site.

## 2024-01-10 NOTE — PLAN OF CARE
Goal Outcome Evaluation:  Plan of Care Reviewed With: patient   Cardiac cath 1/9 - R groin site CDI, no swelling or bruising; 4 stents (3 RCA, 1 circumflex/obtuse marginal; on ASA 81 mg and ticagrelor 90 mg BID; pt encouraged to quit smoking; for am labs including lipid panel

## 2024-01-10 NOTE — DISCHARGE SUMMARY
"[facility] DISCHARGE    Date of Discharge:  1/10/2024    Discharge Diagnosis:     -CCS II/III angina despite maximally tolerated antianginal therapy with known three-vessel coronary artery disease, now status post successful PCI to the RCA with 3 drug-eluting stents and to the circumflex/OM 2 with 1 drug-eluting stent.  See full details below in cath report.      -Hypertension   -CKD stage IIIb  -Moderate aortic valve insufficiency, mild to moderate aortic stenosis  -Tobacco abuse     Presenting Problem/History of Present Illness  Atherosclerosis of coronary artery of native heart with angina pectoris, unspecified vessel or lesion type [I25.119]  S/P drug eluting coronary stent placement [Z95.5]    Per HPI at the time of office visit Dr. Zacarias 12/29/2023: \"Mr. Riggs was last seen here 6 months ago. By way of review, we had initially met when he was hospitalized with unstable angina in 05/2023, and I performed heart catheterization that showed severe left main plus 3-vessel coronary disease as well as aortic valve disease. He was referred for consideration of CABG, and AVR, but ultimately it was turned down. Dr. Duque did reconsider, but the patient himself decided he was not interested in that approach. We have been employing medical therapy, but I have also offered high-risk PCI. He was here with his son at last visit on 06/29/2023, and was not complaining of any worsening symptoms from a coronary standpoint since the prior visit 3 months ago. Prior to that, he was not complaining of angina, but was \"wearing out easily\", and wheezing on occasion with activity, but certainly that could also have been from his at least moderately severe COPD. His biggest complaint at the time was lightheadedness, and dizziness, and what he described as \"brain issues.\" He wanted to have those investigated, and addressed before any coronary or cardiac procedures, which I agreed was reasonable, so I " "recommended continuation of medical therapy including aspirin, and high-intensity statin as well as reminding him the need to quit smoking. I asked him to check with his PCP regarding potential neurologic work-up.     Mr. Riggs comes to the clinic today accompanied by his son who contributes to his history. He reports that he would like to have the stents placed. He reports that he has a constant blank feeling in his head all the time, and is thinking if he gets the stents put in, he will get \"more blood flow up there.\" He states that he is ready to get his heart taken care of because he has trouble with numbness in his hands, and legs. The patient reports that he has not seen anyone about his dizziness, and lightheadedness, and he has not seen LUCIANO Morales, since his last visit.      The patient reports that he is still getting weak, and worn out easily. He states it has worsened to the point that he is noticing trouble breathing. He reports that he gets short of breath, and experiences chest tightness walking in, and around the house. He states that he can walk approximately 20 feet before his symptoms occur.      The patient reports that his blood pressure fluctuates. He states that sometimes it is low, sometimes it is normal, and sometimes it is high. He states that most of the time it is normal. He reports that if he is active for a while, he notices that his blood pressure goes up. The patient states that he feels weaker, more lightheaded, and dizzier today than normal. His son reports that he went to the hospital, and they cut one of his medications because his blood pressure was too low. He reports that he is now taking atenolol 12.5 mg. He reports that he stayed in the hospital for 2 days. The patient reports that he has not had any severe spells of chest pain where he has needed to take nitroglycerin. He reports that he is taking losartan 100 mg tablet, but half of the tablet in the morning, and " "half of the tablet in the evening. He reports that he is on hydralazine 100 mg twice per day. He reports that he has lost weight.     The patient reports that he has not cut down on his smoking. He reports that he quit for 3 to 4 days, and then started again. He reports that he was miserable for those 3 to 4 days.\"    At that visit, Dr. Zacarias discontinued his hydralazine given his symptomatic hypotension and also scheduled him for multivessel PCI given his progressively worsening anginal symptoms despite maximally tolerated antianginal therapy.       Hospital Course  Patient is a 83 y.o. male presented yesterday and underwent successful multivessel PCI with Dr. Zacarias.  See full details below.  Ultimately he received 3 drug-eluting stents to the RCA and 1 drug-eluting stent to the circumflex/OM 2.  He had mildly elevated LVEDP.    Today he states he is doing well and he is without complaint.  He denies any chest discomfort or shortness of breath.  He states it is too soon to tell if he feels improved following PCI and he needs to go home and resume his normal routine to determine this.    He is maintaining sinus rhythm on telemetry without significant ectopy or arrhythmia.  Hemoglobin has dropped slightly from 9.5-8.7 without signs of bleeding.  Renal function is stable.    He will continue his same medical therapy with the addition of Brilinta 90 mg twice daily.  We discussed the importance of compliance with dual antiplatelet therapy without missed doses.  He voices understanding.    As noted below by Dr. Zacarias, he will not be referred for cardiac rehab at this time as he may require further PCI in the near future.     He is felt to be stable for discharge on medical therapy listed below and he will follow-up with Dr. Zacarias in 2 to 4 weeks to discuss symptomatic response to interventions and whether or not more intervention may be needed.    His A1c is below diabetic range.  His LDL is well-controlled on high " intensity statin.  His blood pressures are elevated at times this admission, but I will hold off on adjusting medication at present given his recent issues with hypotension.    We discussed the importance of smoking cessation.  He states he has done well with 4 mg nicotine gum in the past.  I offered him a prescription and he states he will pick this up over-the-counter when he goes to get his Brilinta.    Will follow-up with his PCP in 1 week with repeat CBC and BMP.    Procedures Performed  Procedure(s):  Percutaneous Coronary Intervention  01/09 1520 Note By: You Zacarias MD    Impression:  1. Three vessel coronary artery disease, not significantly changed compared to last diagnostic angiograms from May 31, 2023.  2. Successful complex percutaneous coronary intervention to sequential and severely calcified lesions throughout the right coronary artery, requiring use of intravascular lithotripsy to facilitate full expansion of lesions. Drug-eluting stents placed included a 2.5 x 12 to the distal vessel, 3.25 x 15 to the mid vessel, and a 2.5 x 12 covering the ostium.   3. Successful percutaneous coronary convention to the mid circumflex/OM 2, using a 2.5 x 33 mm drug-eluting stent, postdilated to 3.0 proximally   4. Mildly elevated LVEDP (16mmHg) after 500+ cc of IVF given prior to procedure  5. Chronic kidney disease, stage IIIb    Plan:   1. Two hours bedrest, with overnight observation on telemetry and continuation of IV fluids for another 4 hours  2. Continue aspirin 81mg daily indefinitely, and brilinta 90mg bid for at least 12 months.  3. Aggressive risk factor modification, including high potency statin, ACE, and BB.  4. Tobacco cessation once again encouraged.  5. Follow-up with me in 2-4 weeks to discuss symptomatic response to interventions, and use this as a guide for whether more interventions are needed. If so, would likely require Impella for support (though bilateral iliofemoral systems today  appeared severely calcified fluoroscopically) and certainly require atherectomy or intravascular lithotripsy to a severely calcified proximal LAD lesion, and also perhaps for ostial circumflex disease. Because of this potential need for further intervention, we will hold off on cardiac rehab referral at this point in time. If, ultimately, symptoms have significantly improved after today's intervention is such that further interventions are not required, a referral for cardiac rehab will then be placed at that time  6. Also recommend PCP follow-up within a week for repeat creatinine check    You Zacarias MD     Lab Results (last 72 hours)       Procedure Component Value Units Date/Time    Hemoglobin A1c [[id number]]  (Normal) Collected: 01/10/24 0507    Specimen: Blood Updated: 01/10/24 0656     Hemoglobin A1C 5.20 %     Narrative:      Hemoglobin A1C Ranges:    Increased Risk for Diabetes  5.7% to 6.4%  Diabetes                     >= 6.5%  Diabetic Goal                < 7.0%    Lipid Panel [[id number]]  (Abnormal) Collected: 01/10/24 0507    Specimen: Blood Updated: 01/10/24 0644     Total Cholesterol 90 mg/dL      Triglycerides 68 mg/dL      HDL Cholesterol 33 mg/dL      LDL Cholesterol  42 mg/dL      VLDL Cholesterol 15 mg/dL      LDL/HDL Ratio 1.32    Narrative:      Cholesterol Reference Ranges  (U.[place]. Department of Health and Human Services ATP III Classifications)    Desirable          <200 mg/dL  Borderline High    200-239 mg/dL  High Risk          >240 mg/dL      Triglyceride Reference Ranges  (U.S. Department of Health and Human Services ATP III Classifications)    Normal           <150 mg/dL  Borderline High  150-199 mg/dL  High             200-499 mg/dL  Very High        >500 mg/dL    HDL Reference Ranges  (U.S. Department of Health and Human Services ATP III Classifications)    Low     <40 mg/dl (major risk factor for CHD)  High    >60 mg/dl ('negative' risk factor for CHD)        LDL Reference  Ranges  (U.S. Department of Health and Human Services ATP III Classifications)    Optimal          <100 mg/dL  Near Optimal     100-129 mg/dL  Borderline High  130-159 mg/dL  High             160-189 mg/dL  Very High        >189 mg/dL    Basic Metabolic Panel [[id number]]  (Abnormal) Collected: 01/10/24 0507    Specimen: Blood Updated: 01/10/24 0644     Glucose 89 mg/dL      BUN 30 mg/dL      Creatinine 1.34 mg/dL      Sodium 139 mmol/L      Potassium 3.5 mmol/L      Chloride 104 mmol/L      CO2 28.0 mmol/L      Calcium 8.9 mg/dL      BUN/Creatinine Ratio 22.4     Anion Gap 7.0 mmol/L      eGFR 52.6 mL/min/1.73     Narrative:      GFR Normal >60  Chronic Kidney Disease <60  Kidney Failure <15    The GFR formula is only valid for adults with stable renal function between ages 18 and 70.    CBC (No Diff) [[id number]]  (Abnormal) Collected: 01/10/24 0507    Specimen: Blood Updated: 01/10/24 0620     WBC 9.84 10*3/mm3      RBC 2.91 10*6/mm3      Hemoglobin 8.7 g/dL      Hematocrit 27.3 %      MCV 93.8 fL      MCH 29.9 pg      MCHC 31.9 g/dL      RDW 19.2 %      RDW-SD 63.4 fl      MPV 11.5 fL      Platelets 209 10*3/mm3     Basic Metabolic Panel [[id number]]  (Abnormal) Collected: 01/09/24 1133    Specimen: Blood Updated: 01/09/24 1203     Glucose 96 mg/dL      BUN 38 mg/dL      Creatinine 1.58 mg/dL      Sodium 137 mmol/L      Potassium 4.1 mmol/L      Chloride 101 mmol/L      CO2 27.0 mmol/L      Calcium 8.9 mg/dL      BUN/Creatinine Ratio 24.1     Anion Gap 9.0 mmol/L      eGFR 43.1 mL/min/1.73     Narrative:      GFR Normal >60  Chronic Kidney Disease <60  Kidney Failure <15    The GFR formula is only valid for adults with stable renal function between ages 18 and 70.    CBC (No Diff) [[id number]]  (Abnormal) Collected: 01/09/24 1133    Specimen: Blood Updated: 01/09/24 1147     WBC 8.90 10*3/mm3      RBC 3.16 10*6/mm3      Hemoglobin 9.5 g/dL      Hematocrit 29.9 %      MCV 94.6 fL      MCH 30.1 pg      MCHC 31.8 g/dL       RDW 18.8 %      RDW-SD 63.6 fl      MPV 10.2 fL      Platelets 211 10*3/mm3              Condition on Discharge:  Stable     Physical Exam at Discharge  General: alert and oriented  Card: RRR; NSR/SB 50s-60s on tele with PACs  Resp: CTA  Extrem: PPP, no edema, right groin cath site CDI, no redness, warmth, hematoma, drainage or swelling    Vital Signs  Temp:  [97 °F (36.1 °C)-98.4 °F (36.9 °C)] 97.8 °F (36.6 °C)  Heart Rate:  [52-63] 56  Resp:  [15-18] 16  BP: (133-169)/(42-60) 148/50      Discharge Disposition  Home or Self Care    Discharge Medications     Discharge Medications        New Medications        Instructions Start Date   ticagrelor 90 MG tablet tablet  Commonly known as: BRILINTA   90 mg, Oral, 2 Times Daily             Continue These Medications        Instructions Start Date   albuterol sulfate  (90 Base) MCG/ACT inhaler  Commonly known as: PROVENTIL HFA;VENTOLIN HFA;PROAIR HFA   2 puffs, Inhalation, 4 Times Daily - RT      allopurinol 100 MG tablet  Commonly known as: ZYLOPRIM   100 mg, Oral, Daily      amLODIPine 10 MG tablet  Commonly known as: NORVASC   10 mg, Oral, Daily      aspirin 81 MG EC tablet   81 mg, Oral, Daily      atenolol 25 MG tablet  Commonly known as: TENORMIN   12.5 mg, Oral, Daily      atorvastatin 40 MG tablet  Commonly known as: LIPITOR   40 mg, Oral, Nightly      bismuth subsalicylate 262 MG/15ML suspension  Commonly known as: PEPTO BISMOL   30 mL, Oral, Daily      calcitriol 0.25 MCG capsule  Commonly known as: ROCALTROL   0.25 mcg, Oral, Daily      citalopram 40 MG tablet  Commonly known as: CeleXA   40 mg, Oral, Daily      diphenhydrAMINE 50 MG capsule  Commonly known as: BENADRYL   50 mg, Oral, Nightly PRN      fish oil 1000 MG capsule capsule   1,000 mg, Oral, Daily With Breakfast      Fluticasone-Salmeterol 250-50 MCG/ACT DISKUS  Commonly known as: ADVAIR/WIXELA   1 puff, Inhalation, 2 Times Daily - RT      levothyroxine 112 MCG tablet  Commonly known as:  SYNTHROID, LEVOTHROID   112 mcg, Oral, Daily      loratadine 10 MG tablet  Commonly known as: CLARITIN   10 mg, Oral, Daily PRN      losartan 100 MG tablet  Commonly known as: COZAAR   50 mg, Oral, 2 Times Daily      nitroglycerin 0.4 MG SL tablet  Commonly known as: NITROSTAT   0.4 mg, Sublingual, Every 5 Minutes PRN, Take no more than 3 doses in 15 minutes.      pantoprazole 40 MG EC tablet  Commonly known as: PROTONIX   40 mg, Oral, 2 Times Daily      PROBIOTIC ACIDOPHILUS PO   2 capsules, Oral, 2 Times Daily      ranolazine 500 MG 12 hr tablet  Commonly known as: Ranexa   500 mg, Oral, 2 Times Daily      tamsulosin 0.4 MG capsule 24 hr capsule  Commonly known as: FLOMAX   0.4 mg, Oral, Daily      vitamin B-12 1000 MCG tablet  Commonly known as: CYANOCOBALAMIN   1,000 mcg, Oral, Daily               Discharge Diet: heart healthy     Activity at Discharge:   No heavy lifting for 5-7 days greater than 10 pounds.  No heavy or strenuous pushing or pulling.  No tub baths, hot tubs, swimming pools, or submerging groin underwater for 1 week.  Wash groin site daily with antibacterial soap and water. Rinse and keep clean and dry.  No lotions, powders, or topical ointments to site. Keep open to air and dry.  Call our office with any concerns or if you notice redness, swelling, drainage, warmth, or pain at the site.     Follow-up Appointments  PCP 1 week with BMP and CBC   Dr. Zacarias 2-4 weeks          Electronically signed by BRIANA Sorto, 01/10/24, 8:31 AM CST.     Time:45 minutes

## 2024-01-15 LAB
ACT BLD: 233 SECONDS (ref 82–152)
ACT BLD: 255 SECONDS (ref 82–152)
ACT BLD: 266 SECONDS (ref 82–152)

## 2024-01-28 ENCOUNTER — APPOINTMENT (OUTPATIENT)
Dept: CT IMAGING | Facility: HOSPITAL | Age: 84
End: 2024-01-28
Payer: OTHER GOVERNMENT

## 2024-01-28 ENCOUNTER — APPOINTMENT (OUTPATIENT)
Dept: GENERAL RADIOLOGY | Facility: HOSPITAL | Age: 84
End: 2024-01-28
Payer: OTHER GOVERNMENT

## 2024-01-28 ENCOUNTER — HOSPITAL ENCOUNTER (EMERGENCY)
Facility: HOSPITAL | Age: 84
Discharge: HOME | End: 2024-01-28
Admitting: EMERGENCY MEDICINE
Payer: OTHER GOVERNMENT

## 2024-01-28 VITALS
HEART RATE: 55 BPM | DIASTOLIC BLOOD PRESSURE: 96 MMHG | RESPIRATION RATE: 16 BRPM | BODY MASS INDEX: 20.28 KG/M2 | WEIGHT: 153 LBS | OXYGEN SATURATION: 94 % | SYSTOLIC BLOOD PRESSURE: 126 MMHG | TEMPERATURE: 99.4 F | HEIGHT: 73 IN

## 2024-01-28 DIAGNOSIS — R42 DIZZINESS: ICD-10-CM

## 2024-01-28 DIAGNOSIS — R51.9 SINUS HEADACHE: ICD-10-CM

## 2024-01-28 DIAGNOSIS — J01.00 ACUTE MAXILLARY SINUSITIS, RECURRENCE NOT SPECIFIED: Primary | ICD-10-CM

## 2024-01-28 LAB
ALBUMIN SERPL-MCNC: 3.3 G/DL (ref 3.5–5.2)
ALBUMIN/GLOB SERPL: 1 G/DL
ALP SERPL-CCNC: 52 U/L (ref 39–117)
ALT SERPL W P-5'-P-CCNC: 9 U/L (ref 1–41)
ANION GAP SERPL CALCULATED.3IONS-SCNC: 11 MMOL/L (ref 5–15)
AST SERPL-CCNC: 18 U/L (ref 1–40)
BASOPHILS # BLD AUTO: 0.06 10*3/MM3 (ref 0–0.2)
BASOPHILS NFR BLD AUTO: 0.5 % (ref 0–1.5)
BILIRUB SERPL-MCNC: 0.6 MG/DL (ref 0–1.2)
BILIRUB UR QL STRIP: NEGATIVE
BUN SERPL-MCNC: 36 MG/DL (ref 8–23)
BUN/CREAT SERPL: 20.3 (ref 7–25)
CALCIUM SPEC-SCNC: 9.1 MG/DL (ref 8.6–10.5)
CHLORIDE SERPL-SCNC: 99 MMOL/L (ref 98–107)
CLARITY UR: CLEAR
CO2 SERPL-SCNC: 26 MMOL/L (ref 22–29)
COLOR UR: YELLOW
CREAT SERPL-MCNC: 1.77 MG/DL (ref 0.76–1.27)
D-LACTATE SERPL-SCNC: 1 MMOL/L (ref 0.5–2)
DEPRECATED RDW RBC AUTO: 63.9 FL (ref 37–54)
EGFRCR SERPLBLD CKD-EPI 2021: 37.6 ML/MIN/1.73
EOSINOPHIL # BLD AUTO: 0.4 10*3/MM3 (ref 0–0.4)
EOSINOPHIL NFR BLD AUTO: 3.6 % (ref 0.3–6.2)
ERYTHROCYTE [DISTWIDTH] IN BLOOD BY AUTOMATED COUNT: 19.4 % (ref 12.3–15.4)
FLUAV RNA RESP QL NAA+PROBE: NOT DETECTED
FLUBV RNA RESP QL NAA+PROBE: NOT DETECTED
GEN 5 2HR TROPONIN T REFLEX: 54 NG/L
GLOBULIN UR ELPH-MCNC: 3.3 GM/DL
GLUCOSE SERPL-MCNC: 100 MG/DL (ref 65–99)
GLUCOSE UR STRIP-MCNC: NEGATIVE MG/DL
HCT VFR BLD AUTO: 29.7 % (ref 37.5–51)
HGB BLD-MCNC: 9.6 G/DL (ref 13–17.7)
HGB UR QL STRIP.AUTO: NEGATIVE
IMM GRANULOCYTES # BLD AUTO: 0.08 10*3/MM3 (ref 0–0.05)
IMM GRANULOCYTES NFR BLD AUTO: 0.7 % (ref 0–0.5)
KETONES UR QL STRIP: NEGATIVE
LEUKOCYTE ESTERASE UR QL STRIP.AUTO: NEGATIVE
LYMPHOCYTES # BLD AUTO: 1.08 10*3/MM3 (ref 0.7–3.1)
LYMPHOCYTES NFR BLD AUTO: 9.9 % (ref 19.6–45.3)
MAGNESIUM SERPL-MCNC: 1.5 MG/DL (ref 1.6–2.4)
MCH RBC QN AUTO: 29.8 PG (ref 26.6–33)
MCHC RBC AUTO-ENTMCNC: 32.3 G/DL (ref 31.5–35.7)
MCV RBC AUTO: 92.2 FL (ref 79–97)
MONOCYTES # BLD AUTO: 1.16 10*3/MM3 (ref 0.1–0.9)
MONOCYTES NFR BLD AUTO: 10.6 % (ref 5–12)
NEUTROPHILS NFR BLD AUTO: 74.7 % (ref 42.7–76)
NEUTROPHILS NFR BLD AUTO: 8.18 10*3/MM3 (ref 1.7–7)
NITRITE UR QL STRIP: NEGATIVE
NRBC BLD AUTO-RTO: 0.2 /100 WBC (ref 0–0.2)
PH UR STRIP.AUTO: 5.5 [PH] (ref 5–8)
PLATELET # BLD AUTO: 284 10*3/MM3 (ref 140–450)
PMV BLD AUTO: 11 FL (ref 6–12)
POTASSIUM SERPL-SCNC: 4.1 MMOL/L (ref 3.5–5.2)
PROT SERPL-MCNC: 6.6 G/DL (ref 6–8.5)
PROT UR QL STRIP: ABNORMAL
RBC # BLD AUTO: 3.22 10*6/MM3 (ref 4.14–5.8)
SARS-COV-2 RNA RESP QL NAA+PROBE: NOT DETECTED
SODIUM SERPL-SCNC: 136 MMOL/L (ref 136–145)
SP GR UR STRIP: 1.01 (ref 1–1.03)
T4 FREE SERPL-MCNC: 1.73 NG/DL (ref 0.93–1.7)
TROPONIN T DELTA: 1 NG/L
TROPONIN T SERPL HS-MCNC: 53 NG/L
TSH SERPL DL<=0.05 MIU/L-ACNC: 1.75 UIU/ML (ref 0.27–4.2)
UROBILINOGEN UR QL STRIP: ABNORMAL
WBC NRBC COR # BLD AUTO: 10.96 10*3/MM3 (ref 3.4–10.8)

## 2024-01-28 PROCEDURE — 87636 SARSCOV2 & INF A&B AMP PRB: CPT

## 2024-01-28 PROCEDURE — 71045 X-RAY EXAM CHEST 1 VIEW: CPT

## 2024-01-28 PROCEDURE — 80053 COMPREHEN METABOLIC PANEL: CPT

## 2024-01-28 PROCEDURE — 83605 ASSAY OF LACTIC ACID: CPT

## 2024-01-28 PROCEDURE — 84481 FREE ASSAY (FT-3): CPT

## 2024-01-28 PROCEDURE — 84439 ASSAY OF FREE THYROXINE: CPT

## 2024-01-28 PROCEDURE — 36415 COLL VENOUS BLD VENIPUNCTURE: CPT

## 2024-01-28 PROCEDURE — 93010 ELECTROCARDIOGRAM REPORT: CPT | Performed by: INTERNAL MEDICINE

## 2024-01-28 PROCEDURE — 83735 ASSAY OF MAGNESIUM: CPT

## 2024-01-28 PROCEDURE — 93005 ELECTROCARDIOGRAM TRACING: CPT

## 2024-01-28 PROCEDURE — 81003 URINALYSIS AUTO W/O SCOPE: CPT

## 2024-01-28 PROCEDURE — 84443 ASSAY THYROID STIM HORMONE: CPT

## 2024-01-28 PROCEDURE — 99284 EMERGENCY DEPT VISIT MOD MDM: CPT

## 2024-01-28 PROCEDURE — 70450 CT HEAD/BRAIN W/O DYE: CPT

## 2024-01-28 PROCEDURE — 25810000003 LACTATED RINGERS SOLUTION

## 2024-01-28 PROCEDURE — 85025 COMPLETE CBC W/AUTO DIFF WBC: CPT

## 2024-01-28 PROCEDURE — 84484 ASSAY OF TROPONIN QUANT: CPT

## 2024-01-28 RX ORDER — SODIUM CHLORIDE 0.9 % (FLUSH) 0.9 %
10 SYRINGE (ML) INJECTION AS NEEDED
Status: DISCONTINUED | OUTPATIENT
Start: 2024-01-28 | End: 2024-01-29 | Stop reason: HOSPADM

## 2024-01-28 RX ORDER — AMOXICILLIN AND CLAVULANATE POTASSIUM 875; 125 MG/1; MG/1
1 TABLET, FILM COATED ORAL 2 TIMES DAILY
Qty: 20 TABLET | Refills: 0 | Status: SHIPPED | OUTPATIENT
Start: 2024-01-28 | End: 2024-02-07

## 2024-01-28 RX ORDER — FLUTICASONE PROPIONATE 50 MCG
2 SPRAY, SUSPENSION (ML) NASAL DAILY
Qty: 9.9 ML | Refills: 0 | Status: SHIPPED | OUTPATIENT
Start: 2024-01-28

## 2024-01-28 RX ADMIN — SODIUM CHLORIDE, POTASSIUM CHLORIDE, SODIUM LACTATE AND CALCIUM CHLORIDE 500 ML: 600; 310; 30; 20 INJECTION, SOLUTION INTRAVENOUS at 15:41

## 2024-01-28 NOTE — ED PROVIDER NOTES
Subjective   History of Present Illness  Patient is an 83-year-old male presents to the ER with his son.  Patient reports that he has been generalized weakness for the last 2 days.  Patient was recently hospitalized and had 3 stents placed.  Patient states he has had chronic low blood pressure since discharge.  He states the top number is fine but the bottom number is generally low.  Patient denies any chest pain.  Patient states he has had some intermittent shortness of air.  Patient also states he has had some vision changes last 24 to 48 hours he states he can see and has no issues there but it feels like sometimes it is spinning when he looks at it.  Patient denies any extremity weakness.  Patient is able to ambulate.        Review of Systems   Constitutional:  Positive for fatigue.   Respiratory:  Positive for shortness of breath.    Neurological:  Positive for weakness.   All other systems reviewed and are negative.      Past Medical History:   Diagnosis Date    Abnormal stress test 05/30/2023    Added automatically from request for surgery [id number]    Coronary artery disease     Depression     Diabetes mellitus     Disease of thyroid gland     Elevated cholesterol     Hypertension     Unstable angina pectoris 05/30/2023    Added automatically from request for surgery [id number]       No Known Allergies    Past Surgical History:   Procedure Laterality Date    BACK SURGERY      CARDIAC CATHETERIZATION N/A 5/31/2023    Procedure: Left Heart Cath;  Surgeon: You Zacarias MD;  Location:  [facility];  Service: Cardiology;  Laterality: N/A;    CARDIAC CATHETERIZATION N/A 1/9/2024    Procedure: Percutaneous Coronary Intervention;  Surgeon: You Zacarias MD;  Location:  [facility];  Service: Cardiology;  Laterality: N/A;  right groin - need Shockwave (rep here if possible) for RCA. may also do LCx pending contrast usage    CAROTID ARTERY - SUBCLAVIAN ARTERY BYPASS GRAFT       CHOLECYSTECTOMY      COLONOSCOPY  09/28/2012    Normal exam    COLONOSCOPY  02/26/2018    Normal exam repeat prn    ENDOSCOPY N/A 5/28/2019    A large amount of food residue in the stomach otherwise normal exam.    GALLBLADDER SURGERY         Family History   Problem Relation Age of Onset    No Known Problems Father     No Known Problems Mother     Diabetes Brother     Colon cancer Neg Hx     Colon polyps Neg Hx        Social History     Socioeconomic History    Marital status:    Tobacco Use    Smoking status: Every Day     Packs/day: 1     Types: Cigarettes    Smokeless tobacco: Never   Vaping Use    Vaping Use: Never used   Substance and Sexual Activity    Alcohol use: No    Drug use: Never    Sexual activity: Defer           Objective   Physical Exam  Vitals and nursing note reviewed.   Constitutional:       General: He is not in acute distress.     Appearance: Normal appearance. He is normal weight. He is not toxic-appearing or diaphoretic.   HENT:      Head: Normocephalic and atraumatic.      Right Ear: External ear normal.      Left Ear: External ear normal.      Nose: Nose normal.      Mouth/Throat:      Mouth: Mucous membranes are moist.      Pharynx: Oropharynx is clear.   Eyes:      General:         Right eye: No discharge.         Left eye: No discharge.      Extraocular Movements: Extraocular movements intact.      Conjunctiva/sclera: Conjunctivae normal.      Pupils: Pupils are equal, round, and reactive to light.      Comments: Pupils are round equal and reactive.  Patient was able to follow my finger with his eyes without difficulty.     Cardiovascular:      Rate and Rhythm: Regular rhythm. Bradycardia present.   Pulmonary:      Effort: Pulmonary effort is normal. No respiratory distress.      Breath sounds: Normal breath sounds. No rhonchi.      Comments: Sounds clear bilaterally, trachea is equal and airway is patent.  Abdominal:      General: Abdomen is flat. Bowel sounds are normal.       "Tenderness: There is no abdominal tenderness. There is no guarding or rebound.      Comments: Elvira is soft nondistended nontender upon palpation, bowel sounds active all 4 quadrants   Musculoskeletal:         General: No deformity or signs of injury. Normal range of motion.      Cervical back: Normal range of motion.   Skin:     General: Skin is warm and dry.      Capillary Refill: Capillary refill takes 2 to 3 seconds.      Coloration: Skin is not jaundiced.   Neurological:      Mental Status: He is alert and oriented to person, place, and time. Mental status is at baseline.   Psychiatric:         Mood and Affect: Mood normal.         Behavior: Behavior normal.         Thought Content: Thought content normal.         Judgment: Judgment normal.         Procedures           ED Course  ED Course as of 01/28/24 2304   Sun Jan 28, 2024 1939 Spoke with patient after receiving IV fluids patient states he does not feel better patient reports having a headache now and when questioned about the headache he said this has been ongoing for the last 2 to 3 days.  Denies any fever cough or chills. [HS]   2008 Care transferred to [facility] at this time, pending thyroid panel, COVID swab, CT of head. [HS]   2300 This is a turn over from Aleksandra WARREN.  [TW]      ED Course User Index  [HS] Aleksandra Fernandez APRN  [TW] Alison Aguilera APRN                                             Medical Decision Making  This is a turnover from Aleksandra WARREN-see her note for complete details.      Dizziness, lightheadedness, headache, generalized weakness, and low blood pressures.  Per review patient was admitted to this facility January 9, 2024 to January 10, 2024.   Per the note patient reported having issues with \"wearing out easily\" as well as \"brain issues.\"  He complained of having lightheadedness and dizziness.  It was felt patient was having symptomatic hypotension and his hydralazine was decreased. He was taken to " "the cardiac cath lab on January 9 and had 3 cardiac stents placed by Dr. Zacarias to the RCA.  1 to the distal, 1 to the mid vessel, and 1 covering the ostium.  Additionally he had a stent placed to the mid circumflex/OM 2.  Please see note for complete details    On my exam patient has no chest pain or shortness of breath.  He states, \"I feel like my brain just is not working right.\"  He complains of a headache describing pain to his forehead and states that it feels as if his brain was pushed down.\"  He also has pain to his sinuses.  Per workup patient had 2 troponins.  They were both elevated however no significant change from initial troponin to the second troponin.  Patient has no chest pain.  No changes noted on EKGs.  Labs reveal mild anemia which appears to be at his baseline.  Patient's troponin is bumped however no significant change from his baseline.  COVID and influenza were both negative.  TSH was within normal limits and magnesium was mildly low.  Chest x-ray was negative for acute findings.  CT of the head was negative for acute findings, noted left maxillary sinusitis.  Patient does have sinus symptoms and what appears to be a sinus headache.  We will treat accordingly and recommend to follow-up with his PCP this week as well as cardiology.  He has been instructed if he has any worsening symptoms to return to the emergency department. Dr. Greenfield reviewed case and labs and agrees with treatment plan.    Labs Reviewed  COMPREHENSIVE METABOLIC PANEL - Abnormal; Notable for the following components:     Glucose                       100 (*)                BUN                           36 (*)                 Creatinine                    1.77 (*)               Albumin                       3.3 (*)                eGFR                          37.6 (*)            All other components within normal limits         Narrative: GFR Normal >60                  Chronic Kidney Disease <60                  " Kidney Failure <15                                    The GFR formula is only valid for adults with stable renal function between ages 18 and 70.  URINALYSIS W/ CULTURE IF INDICATED - Abnormal; Notable for the following components:     Protein, UA                   Trace (*)            All other components within normal limits         Narrative: In absence of clinical symptoms, the presence of pyuria, bacteria, and/or nitrites on the urinalysis result does not correlate with infection.                  Urine microscopic not indicated.  TROPONIN - Abnormal; Notable for the following components:     HS Troponin T                 53 (*)              All other components within normal limits         Narrative: High Sensitive Troponin T Reference Range:                  <14.0 ng/L- Negative Female for AMI                  <22.0 ng/L- Negative Male for AMI                  >=14 - Abnormal Female indicating possible myocardial injury.                  >=22 - Abnormal Male indicating possible myocardial injury.                   Clinicians would have to utilize clinical acumen, EKG, Troponin, and serial changes to determine if it is an Acute Myocardial Infarction or myocardial injury due to an underlying chronic condition.                                       MAGNESIUM - Abnormal; Notable for the following components:     Magnesium                     1.5 (*)             All other components within normal limits  CBC WITH AUTO DIFFERENTIAL - Abnormal; Notable for the following components:     WBC                           10.96 (*)               RBC                           3.22 (*)               Hemoglobin                    9.6 (*)                Hematocrit                    29.7 (*)               RDW                           19.4 (*)               RDW-SD                        63.9 (*)               Lymphocyte %                  9.9 (*)                Immature Grans %              0.7 (*)                Neutrophils,  Absolute         8.18 (*)               Monocytes, Absolute           1.16 (*)               Immature Grans, Absolute      0.08 (*)            All other components within normal limits  HIGH SENSITIVITIY TROPONIN T 2HR - Abnormal; Notable for the following components:     HS Troponin T                 54 (*)              All other components within normal limits         Narrative: High Sensitive Troponin T Reference Range:                  <14.0 ng/L- Negative Female for AMI                  <22.0 ng/L- Negative Male for AMI                  >=14 - Abnormal Female indicating possible myocardial injury.                  >=22 - Abnormal Male indicating possible myocardial injury.                   Clinicians would have to utilize clinical acumen, EKG, Troponin, and serial changes to determine if it is an Acute Myocardial Infarction or myocardial injury due to an underlying chronic condition.                                       T4, FREE - Abnormal; Notable for the following components:     Free T4                       1.73 (*)            All other components within normal limits         Narrative: Results may be falsely increased if patient taking Biotin.                    COVID-19 AND FLU A/B, NP SWAB IN TRANSPORT MEDIA 1 HR TAT - Normal         Narrative: Fact sheet for providers: https://www.fda.gov/media/134388/download                                    Fact sheet for patients: https://www.fda.gov/media/683463/download                                    Test performed by PCR.  LACTIC ACID, PLASMA - Normal  TSH - Normal  COVID PRE-OP / PRE-PROCEDURE SCREENING ORDER (NO ISOLATION)         Narrative: The following orders were created for panel order COVID PRE-OP / PRE-PROCEDURE SCREENING ORDER (NO ISOLATION) - Swab, Nasopharynx.                  Procedure                               Abnormality         Status                                     ---------                               -----------         ------                                      COVID-19 and FLU A/B PCR...[[id number]]  Normal              Final result                                                 Please view results for these tests on the individual orders.  T3, FREE  CBC AND DIFFERENTIAL     CT Head Without Contrast   Final Result    1. No acute intracranial findings.    2. Acute left maxillary sinusitis.              This report was signed and finalized on 1/28/2024 8:57 PM by Dr. Himanshu Madison MD.          XR Chest 1 View   Final Result    Trace left pleural effusion. Otherwise, no acute findings.         This report was signed and finalized on 1/28/2024 3:36 PM by Dr. Himanshu Madison MD.             Amount and/or Complexity of Data Reviewed  Labs: ordered. Decision-making details documented in ED Course.  Radiology: ordered. Decision-making details documented in ED Course.  ECG/medicine tests: ordered. Decision-making details documented in ED Course.  Discussion of management or test interpretation with external provider(s): This is a turn over from Aleksandra Deleon  Prescription drug management.        Final diagnoses:   Acute maxillary sinusitis, recurrence not specified   Sinus headache   Dizziness       ED Disposition  ED Disposition       ED Disposition   Discharge    Condition   Good    Comment   --               No follow-up provider specified.       Medication List        New Prescriptions      amoxicillin-clavulanate 875-125 MG per tablet  Commonly known as: AUGMENTIN  Take 1 tablet by mouth 2 (Two) Times a Day for 10 days.     fluticasone 50 MCG/ACT nasal spray  Commonly known as: FLONASE  2 sprays into the nostril(s) as directed by provider Daily.               Where to Get Your Medications        These medications were sent to [facility] [address] [place] [address] [address] [facility] [phone] [facility] [phone] [address] [address]      Phone: [phone]   amoxicillin-clavulanate 875-125 MG  per tablet  fluticasone 50 MCG/ACT nasal spray            Alison Aguilear, APRN  01/28/24 [id number]

## 2024-01-29 ENCOUNTER — TELEPHONE (OUTPATIENT)
Dept: CARDIOLOGY | Facility: CLINIC | Age: 84
End: 2024-01-29
Payer: MEDICARE

## 2024-01-29 LAB
QT INTERVAL: 424 MS
QTC INTERVAL: 405 MS
T3FREE SERPL-MCNC: 1.67 PG/ML (ref 2–4.4)

## 2024-01-30 ENCOUNTER — APPOINTMENT (OUTPATIENT)
Dept: CT IMAGING | Facility: HOSPITAL | Age: 84
End: 2024-01-30
Payer: OTHER GOVERNMENT

## 2024-01-30 ENCOUNTER — HOSPITAL ENCOUNTER (EMERGENCY)
Facility: HOSPITAL | Age: 84
Discharge: HOME | End: 2024-01-31
Attending: EMERGENCY MEDICINE | Admitting: EMERGENCY MEDICINE
Payer: OTHER GOVERNMENT

## 2024-01-30 DIAGNOSIS — G62.9 PERIPHERAL POLYNEUROPATHY: Primary | ICD-10-CM

## 2024-01-30 DIAGNOSIS — J32.0 MAXILLARY SINUSITIS, UNSPECIFIED CHRONICITY: ICD-10-CM

## 2024-01-30 LAB
ALBUMIN SERPL-MCNC: 3.6 G/DL (ref 3.5–5.2)
ALBUMIN/GLOB SERPL: 1 G/DL
ALP SERPL-CCNC: 56 U/L (ref 39–117)
ALT SERPL W P-5'-P-CCNC: 10 U/L (ref 1–41)
ANION GAP SERPL CALCULATED.3IONS-SCNC: 10 MMOL/L (ref 5–15)
AST SERPL-CCNC: 22 U/L (ref 1–40)
BASOPHILS # BLD AUTO: 0.04 10*3/MM3 (ref 0–0.2)
BASOPHILS NFR BLD AUTO: 0.4 % (ref 0–1.5)
BILIRUB SERPL-MCNC: 0.5 MG/DL (ref 0–1.2)
BUN SERPL-MCNC: 46 MG/DL (ref 8–23)
BUN/CREAT SERPL: 22.9 (ref 7–25)
CALCIUM SPEC-SCNC: 9.4 MG/DL (ref 8.6–10.5)
CHLORIDE SERPL-SCNC: 98 MMOL/L (ref 98–107)
CO2 SERPL-SCNC: 29 MMOL/L (ref 22–29)
CREAT SERPL-MCNC: 2.01 MG/DL (ref 0.76–1.27)
DEPRECATED RDW RBC AUTO: 62.4 FL (ref 37–54)
EGFRCR SERPLBLD CKD-EPI 2021: 32.3 ML/MIN/1.73
EOSINOPHIL # BLD AUTO: 0.67 10*3/MM3 (ref 0–0.4)
EOSINOPHIL NFR BLD AUTO: 6.6 % (ref 0.3–6.2)
ERYTHROCYTE [DISTWIDTH] IN BLOOD BY AUTOMATED COUNT: 19.5 % (ref 12.3–15.4)
GLOBULIN UR ELPH-MCNC: 3.6 GM/DL
GLUCOSE SERPL-MCNC: 93 MG/DL (ref 65–99)
HCT VFR BLD AUTO: 29.8 % (ref 37.5–51)
HGB BLD-MCNC: 9.9 G/DL (ref 13–17.7)
HOLD SPECIMEN: NORMAL
HOLD SPECIMEN: NORMAL
IMM GRANULOCYTES # BLD AUTO: 0.08 10*3/MM3 (ref 0–0.05)
IMM GRANULOCYTES NFR BLD AUTO: 0.8 % (ref 0–0.5)
INR PPP: 1.08 (ref 0.91–1.09)
LYMPHOCYTES # BLD AUTO: 1.37 10*3/MM3 (ref 0.7–3.1)
LYMPHOCYTES NFR BLD AUTO: 13.6 % (ref 19.6–45.3)
MCH RBC QN AUTO: 30.2 PG (ref 26.6–33)
MCHC RBC AUTO-ENTMCNC: 33.2 G/DL (ref 31.5–35.7)
MCV RBC AUTO: 90.9 FL (ref 79–97)
MONOCYTES # BLD AUTO: 1.34 10*3/MM3 (ref 0.1–0.9)
MONOCYTES NFR BLD AUTO: 13.3 % (ref 5–12)
NEUTROPHILS NFR BLD AUTO: 6.59 10*3/MM3 (ref 1.7–7)
NEUTROPHILS NFR BLD AUTO: 65.3 % (ref 42.7–76)
NRBC BLD AUTO-RTO: 0 /100 WBC (ref 0–0.2)
NT-PROBNP SERPL-MCNC: 4658 PG/ML (ref 0–1800)
PLATELET # BLD AUTO: 262 10*3/MM3 (ref 140–450)
PMV BLD AUTO: 10.1 FL (ref 6–12)
POTASSIUM SERPL-SCNC: 3.5 MMOL/L (ref 3.5–5.2)
PROT SERPL-MCNC: 7.2 G/DL (ref 6–8.5)
PROTHROMBIN TIME: 14.2 SECONDS (ref 11.8–14.8)
RBC # BLD AUTO: 3.28 10*6/MM3 (ref 4.14–5.8)
SODIUM SERPL-SCNC: 137 MMOL/L (ref 136–145)
TROPONIN T SERPL HS-MCNC: 62 NG/L
WBC NRBC COR # BLD AUTO: 10.09 10*3/MM3 (ref 3.4–10.8)
WHOLE BLOOD HOLD COAG: NORMAL
WHOLE BLOOD HOLD SPECIMEN: NORMAL

## 2024-01-30 PROCEDURE — 70450 CT HEAD/BRAIN W/O DYE: CPT

## 2024-01-30 PROCEDURE — 85025 COMPLETE CBC W/AUTO DIFF WBC: CPT | Performed by: FAMILY MEDICINE

## 2024-01-30 PROCEDURE — 83880 ASSAY OF NATRIURETIC PEPTIDE: CPT | Performed by: FAMILY MEDICINE

## 2024-01-30 PROCEDURE — 93010 ELECTROCARDIOGRAM REPORT: CPT | Performed by: EMERGENCY MEDICINE

## 2024-01-30 PROCEDURE — 84484 ASSAY OF TROPONIN QUANT: CPT | Performed by: FAMILY MEDICINE

## 2024-01-30 PROCEDURE — 93005 ELECTROCARDIOGRAM TRACING: CPT | Performed by: FAMILY MEDICINE

## 2024-01-30 PROCEDURE — 85610 PROTHROMBIN TIME: CPT | Performed by: FAMILY MEDICINE

## 2024-01-30 PROCEDURE — 80053 COMPREHEN METABOLIC PANEL: CPT | Performed by: FAMILY MEDICINE

## 2024-01-30 PROCEDURE — 99284 EMERGENCY DEPT VISIT MOD MDM: CPT

## 2024-01-30 PROCEDURE — 36415 COLL VENOUS BLD VENIPUNCTURE: CPT

## 2024-01-30 RX ORDER — SODIUM CHLORIDE 0.9 % (FLUSH) 0.9 %
10 SYRINGE (ML) INJECTION AS NEEDED
Status: DISCONTINUED | OUTPATIENT
Start: 2024-01-30 | End: 2024-01-31 | Stop reason: HOSPADM

## 2024-01-30 NOTE — TELEPHONE ENCOUNTER
I contacted the patient to offer him a few openings this week.  He stated he is not sure what is going on right now and has a lot to think about.  He is not interested in rescheduling right now but will call us back when he speaks with his wife.

## 2024-01-31 VITALS
WEIGHT: 143 LBS | RESPIRATION RATE: 20 BRPM | SYSTOLIC BLOOD PRESSURE: 146 MMHG | HEIGHT: 73 IN | TEMPERATURE: 98.4 F | DIASTOLIC BLOOD PRESSURE: 46 MMHG | OXYGEN SATURATION: 97 % | HEART RATE: 58 BPM | BODY MASS INDEX: 18.95 KG/M2

## 2024-01-31 LAB
GEN 5 2HR TROPONIN T REFLEX: 58 NG/L
HOLD SPECIMEN: NORMAL
QT INTERVAL: 534 MS
QTC INTERVAL: 492 MS
TROPONIN T DELTA: -4 NG/L

## 2024-01-31 NOTE — CASE MANAGEMENT/SOCIAL WORK
Rec'd a call through the night that pt was being d/c'ed from ER but wanted information about snf/rehab. Called his son Ross's number [phone] but it did not go through. Left a message on pt's phone [phone].

## 2024-01-31 NOTE — DISCHARGE INSTRUCTIONS
Take your medications as prescribed and follow-up with primary care in 48 to 72 hours and return immediately to the emergency department for any worsening symptoms or for any other reasons

## 2024-01-31 NOTE — ED PROVIDER NOTES
Subjective   History of Present Illness  Patient is 83-year-old male who was seen 1/28/2024 in the emergency department for similar symptoms patient complained of a headache at that time and had an evaluation which showed patient had an acute maxillary sinusitis.  Patient was placed on Augmentin on an outpatient basis.  Patient returns as he is having a headache at the top of his head and states that he has numbness in both his arms and his legs however patient also states that he has a chronic neuropathy that affects his legs and that the numbness and tingling in his legs is similar to what he had previously only it is a little more proximal in his legs than what it was yesterday.         Review of Systems   Constitutional:  Negative for activity change, appetite change, chills and fever.   HENT:  Positive for sinus pressure and sinus pain. Negative for facial swelling, sore throat and trouble swallowing.    Eyes:  Negative for photophobia, pain and discharge.   Respiratory:  Negative for cough, shortness of breath and wheezing.    Cardiovascular:  Negative for chest pain, palpitations and leg swelling.   Gastrointestinal:  Negative for abdominal pain, diarrhea, nausea and vomiting.   Genitourinary:  Negative for difficulty urinating, frequency and urgency.   Musculoskeletal:  Negative for arthralgias, back pain, gait problem, myalgias and neck stiffness.   Skin:  Negative for color change, pallor and rash.   Neurological:  Positive for numbness and headaches. Negative for syncope and speech difficulty.       Past Medical History:   Diagnosis Date    Abnormal stress test 05/30/2023    Added automatically from request for surgery [id number]    Coronary artery disease     Depression     Diabetes mellitus     Disease of thyroid gland     Elevated cholesterol     Hypertension     Unstable angina pectoris 05/30/2023    Added automatically from request for surgery [id number]       No Known Allergies    Past Surgical History:    Procedure Laterality Date    BACK SURGERY      CARDIAC CATHETERIZATION N/A 5/31/2023    Procedure: Left Heart Cath;  Surgeon: You Zacarias MD;  Location:  [facility];  Service: Cardiology;  Laterality: N/A;    CARDIAC CATHETERIZATION N/A 1/9/2024    Procedure: Percutaneous Coronary Intervention;  Surgeon: You Zacarias MD;  Location:  [facility];  Service: Cardiology;  Laterality: N/A;  right groin - need Shockwave (rep here if possible) for RCA. may also do LCx pending contrast usage    CAROTID ARTERY - SUBCLAVIAN ARTERY BYPASS GRAFT      CHOLECYSTECTOMY      COLONOSCOPY  09/28/2012    Normal exam    COLONOSCOPY  02/26/2018    Normal exam repeat prn    ENDOSCOPY N/A 5/28/2019    A large amount of food residue in the stomach otherwise normal exam.    GALLBLADDER SURGERY         Family History   Problem Relation Age of Onset    No Known Problems Father     No Known Problems Mother     Diabetes Brother     Colon cancer Neg Hx     Colon polyps Neg Hx        Social History     Socioeconomic History    Marital status:    Tobacco Use    Smoking status: Every Day     Packs/day: 1     Types: Cigarettes    Smokeless tobacco: Never   Vaping Use    Vaping Use: Never used   Substance and Sexual Activity    Alcohol use: No    Drug use: Never    Sexual activity: Defer           Objective   Physical Exam  Constitutional:       General: He is not in acute distress.     Appearance: He is well-developed and normal weight. He is ill-appearing. He is not toxic-appearing.   HENT:      Head: Normocephalic and atraumatic.      Mouth/Throat:      Mouth: Mucous membranes are moist.      Pharynx: Oropharynx is clear. No pharyngeal swelling or oropharyngeal exudate.   Eyes:      Extraocular Movements: Extraocular movements intact.      Pupils: Pupils are equal, round, and reactive to light.   Neck:      Thyroid: No thyromegaly.      Vascular: No hepatojugular reflux or JVD.   Cardiovascular:       Rate and Rhythm: Normal rate and regular rhythm.      Heart sounds: No murmur heard.     No friction rub. No gallop.   Pulmonary:      Effort: Pulmonary effort is normal. No tachypnea, accessory muscle usage or respiratory distress.      Breath sounds: Normal breath sounds. No decreased breath sounds, wheezing, rhonchi or rales.   Chest:      Chest wall: No mass or tenderness.   Abdominal:      General: Bowel sounds are normal.      Palpations: Abdomen is soft. There is no hepatomegaly, splenomegaly or mass.      Tenderness: There is no abdominal tenderness. There is rebound. There is no guarding.   Musculoskeletal:         General: Normal range of motion.      Cervical back: Normal range of motion and neck supple.      Right lower leg: No tenderness. No edema.      Left lower leg: No tenderness. No edema.   Lymphadenopathy:      Cervical: No cervical adenopathy.   Skin:     General: Skin is warm and dry.      Capillary Refill: Capillary refill takes less than 2 seconds.   Neurological:      General: No focal deficit present.      Mental Status: He is alert and oriented to person, place, and time.      Cranial Nerves: No cranial nerve deficit.      Motor: No weakness.   Psychiatric:         Mood and Affect: Mood normal.         Behavior: Behavior normal.         Procedures           ED Course                          Total (NIH Stroke Scale): 7                  Medical Decision Making  Patient is an 83-year-old male recently diagnosed with maxillary sinusitis who is initiated outpatient antibiotics for the past 24 to 48 hours who presents with complaints of a headache and worsening of his neuropathy in his lower extremities.  Patient has no nausea vomiting diarrhea does not complain of any fevers at this time.  Differential diagnosis includes metabolic abnormality, worsening of chronic peripheral neuropathy, cephalgia secondary to maxillary sinusitis.  CT of the head shows no acute evidence of any CVA and feel  that this is not stroke related as patient has bilateral complaints of numbness in his lower extremities.  Patient's elevated troponins are consistent with his chronic kidney disease and patient has no complaints of chest pain or shortness of breath at this time.  Labs show mild anemia and renal insufficiency otherwise no clinically significant abnormalities noted other than that previously discussed with the elevated troponins consistent with patient's renal insufficiency.  Patient to be discharged home and was instructed to take his antibiotics as prescribed    Amount and/or Complexity of Data Reviewed  Radiology: ordered.        Final diagnoses:   None       ED Disposition  ED Disposition       None            No follow-up provider specified.       Medication List      No changes were made to your prescriptions during this visit.            Alfredo Alva MD  02/01/24 0289

## 2024-02-07 ENCOUNTER — TRANSCRIBE ORDERS (OUTPATIENT)
Dept: HOME HEALTH SERVICES | Facility: HOME HEALTHCARE | Age: 84
End: 2024-02-07
Payer: MEDICARE

## 2024-02-07 ENCOUNTER — HOME HEALTH ADMISSION (OUTPATIENT)
Dept: HOME HEALTH SERVICES | Facility: HOME HEALTHCARE | Age: 84
End: 2024-02-07
Payer: OTHER GOVERNMENT

## 2024-02-07 DIAGNOSIS — E87.6 HYPOKALEMIA: Primary | ICD-10-CM

## 2024-04-17 ENCOUNTER — HOME HEALTH ADMISSION (OUTPATIENT)
Dept: HOME HEALTH SERVICES | Facility: HOME HEALTHCARE | Age: 84
End: 2024-04-17
Payer: OTHER GOVERNMENT

## 2024-04-17 ENCOUNTER — TRANSCRIBE ORDERS (OUTPATIENT)
Dept: HOME HEALTH SERVICES | Facility: HOME HEALTHCARE | Age: 84
End: 2024-04-17
Payer: MEDICARE

## 2024-04-17 DIAGNOSIS — J44.1 CHRONIC OBSTRUCTIVE PULMONARY DISEASE WITH ACUTE EXACERBATION: Primary | ICD-10-CM

## 2024-04-18 ENCOUNTER — HOME CARE VISIT (OUTPATIENT)
Dept: HOME HEALTH SERVICES | Facility: CLINIC | Age: 84
End: 2024-04-18
Payer: MEDICARE

## 2024-04-18 NOTE — CASE COMMUNICATION
"Please route to LUCIANO Morales    Pt was nonadmit for HH services.  Pt and son Ross states that they do not need services at this time.  Pt states he has a HH aide coming to assist him w/ cooking/cleaning/bathing etc and is not interested in additional services at this time.  Offerred PT/OT services as well, patient declines stating he is \"getting around just fine w/ his cane.\""

## 2024-08-31 ENCOUNTER — HOSPITAL ENCOUNTER (EMERGENCY)
Age: 84
Discharge: ANOTHER HOSPITAL | End: 2024-09-02
Attending: EMERGENCY MEDICINE
Payer: OTHER GOVERNMENT

## 2024-08-31 DIAGNOSIS — F32.A DEPRESSION WITH SUICIDAL IDEATION: Primary | ICD-10-CM

## 2024-08-31 DIAGNOSIS — R45.851 DEPRESSION WITH SUICIDAL IDEATION: Primary | ICD-10-CM

## 2024-08-31 LAB
ALBUMIN SERPL-MCNC: 2.9 G/DL (ref 3.5–5.2)
ALP SERPL-CCNC: 72 U/L (ref 40–129)
ALT SERPL-CCNC: 8 U/L (ref 5–41)
AMPHET UR QL SCN: NEGATIVE
ANION GAP SERPL CALCULATED.3IONS-SCNC: 8 MMOL/L (ref 7–19)
AST SERPL-CCNC: 19 U/L (ref 5–40)
BACTERIA #/AREA URNS HPF: ABNORMAL /HPF
BARBITURATES UR QL SCN: NEGATIVE
BASOPHILS # BLD: 0 K/UL (ref 0–0.2)
BASOPHILS NFR BLD: 0.1 % (ref 0–1)
BENZODIAZ UR QL SCN: POSITIVE
BILIRUB SERPL-MCNC: 0.7 MG/DL (ref 0.2–1.2)
BILIRUB UR QL STRIP: NEGATIVE
BUN SERPL-MCNC: 50 MG/DL (ref 8–23)
BUPRENORPHINE URINE: NEGATIVE
CALCIUM SERPL-MCNC: 9.2 MG/DL (ref 8.8–10.2)
CANNABINOIDS UR QL SCN: NEGATIVE
CHLORIDE SERPL-SCNC: 105 MMOL/L (ref 98–111)
CLARITY UR: ABNORMAL
CO2 SERPL-SCNC: 29 MMOL/L (ref 22–29)
COCAINE UR QL SCN: NEGATIVE
COLOR UR: YELLOW
CREAT SERPL-MCNC: 1.5 MG/DL (ref 0.7–1.2)
DRUG SCREEN COMMENT UR-IMP: ABNORMAL
EOSINOPHIL # BLD: 0.1 K/UL (ref 0–0.6)
EOSINOPHIL NFR BLD: 0.8 % (ref 0–5)
ERYTHROCYTE [DISTWIDTH] IN BLOOD BY AUTOMATED COUNT: 18.7 % (ref 11.5–14.5)
ETHANOLAMINE SERPL-MCNC: <10 MG/DL (ref 0–0.08)
FENTANYL SCREEN, URINE: NEGATIVE
FLUAV AG NPH QL: NEGATIVE
FLUBV AG NPH QL: NEGATIVE
GLUCOSE SERPL-MCNC: 114 MG/DL (ref 70–99)
GLUCOSE UR STRIP.AUTO-MCNC: NEGATIVE MG/DL
HCT VFR BLD AUTO: 39 % (ref 42–52)
HGB BLD-MCNC: 12.7 G/DL (ref 14–18)
HGB UR STRIP.AUTO-MCNC: ABNORMAL MG/L
HYALINE CASTS #/AREA URNS LPF: ABNORMAL /LPF (ref 0–5)
IMM GRANULOCYTES # BLD: 0.1 K/UL
KETONES UR STRIP.AUTO-MCNC: NEGATIVE MG/DL
LEUKOCYTE ESTERASE UR QL STRIP.AUTO: ABNORMAL
LYMPHOCYTES # BLD: 1.8 K/UL (ref 1.1–4.5)
LYMPHOCYTES NFR BLD: 11.8 % (ref 20–40)
MCH RBC QN AUTO: 29.2 PG (ref 27–31)
MCHC RBC AUTO-ENTMCNC: 32.6 G/DL (ref 33–37)
MCV RBC AUTO: 89.7 FL (ref 80–94)
METHADONE UR QL SCN: NEGATIVE
METHAMPHETAMINE, URINE: NEGATIVE
MONOCYTES # BLD: 1.2 K/UL (ref 0–0.9)
MONOCYTES NFR BLD: 7.8 % (ref 0–10)
NEUTROPHILS # BLD: 11.6 K/UL (ref 1.5–7.5)
NEUTS SEG NFR BLD: 78.7 % (ref 50–65)
NITRITE UR QL STRIP.AUTO: NEGATIVE
OPIATES UR QL SCN: NEGATIVE
OXYCODONE UR QL SCN: NEGATIVE
PCP UR QL SCN: NEGATIVE
PH UR STRIP.AUTO: 5.5 [PH] (ref 5–8)
PLATELET # BLD AUTO: 288 K/UL (ref 130–400)
PMV BLD AUTO: 10.3 FL (ref 9.4–12.4)
POTASSIUM SERPL-SCNC: 4.4 MMOL/L (ref 3.5–5)
PROT SERPL-MCNC: 6.7 G/DL (ref 6.4–8.3)
PROT UR STRIP.AUTO-MCNC: 300 MG/DL
RBC # BLD AUTO: 4.35 M/UL (ref 4.7–6.1)
RBC #/AREA URNS HPF: ABNORMAL /HPF (ref 0–2)
SARS-COV-2 RDRP RESP QL NAA+PROBE: NOT DETECTED
SODIUM SERPL-SCNC: 142 MMOL/L (ref 136–145)
SP GR UR STRIP.AUTO: 1.02 (ref 1–1.03)
SQUAMOUS #/AREA URNS HPF: ABNORMAL /HPF
TRICYCLIC ANTIDEPRESSANTS, UR: NEGATIVE
UROBILINOGEN UR STRIP.AUTO-MCNC: 0.2 E.U./DL
WBC # BLD AUTO: 14.8 K/UL (ref 4.8–10.8)
WBC #/AREA URNS HPF: ABNORMAL /HPF (ref 0–5)

## 2024-08-31 PROCEDURE — 80053 COMPREHEN METABOLIC PANEL: CPT

## 2024-08-31 PROCEDURE — 99285 EMERGENCY DEPT VISIT HI MDM: CPT

## 2024-08-31 PROCEDURE — G0480 DRUG TEST DEF 1-7 CLASSES: HCPCS

## 2024-08-31 PROCEDURE — 80307 DRUG TEST PRSMV CHEM ANLYZR: CPT

## 2024-08-31 PROCEDURE — 87635 SARS-COV-2 COVID-19 AMP PRB: CPT

## 2024-08-31 PROCEDURE — 87804 INFLUENZA ASSAY W/OPTIC: CPT

## 2024-08-31 PROCEDURE — 93005 ELECTROCARDIOGRAM TRACING: CPT | Performed by: PEDIATRICS

## 2024-08-31 PROCEDURE — 36415 COLL VENOUS BLD VENIPUNCTURE: CPT

## 2024-08-31 PROCEDURE — 82077 ASSAY SPEC XCP UR&BREATH IA: CPT

## 2024-08-31 PROCEDURE — 85025 COMPLETE CBC W/AUTO DIFF WBC: CPT

## 2024-08-31 PROCEDURE — 81001 URINALYSIS AUTO W/SCOPE: CPT

## 2024-08-31 PROCEDURE — 87086 URINE CULTURE/COLONY COUNT: CPT

## 2024-08-31 ASSESSMENT — LIFESTYLE VARIABLES
HOW MANY STANDARD DRINKS CONTAINING ALCOHOL DO YOU HAVE ON A TYPICAL DAY: PATIENT DOES NOT DRINK
HOW OFTEN DO YOU HAVE A DRINK CONTAINING ALCOHOL: NEVER

## 2024-08-31 NOTE — PROGRESS NOTES
[facility]IAL [facility]TAKE ASSESSMENT     8/31/24    Babak Lisa ,a 83 y.o. male, presents to the ED for a psychiatric assessment.     ED Arrival time: 1431  ED physician: Marek BRAVO Notification time: 1630  [facility] Assessment start time: 1631  Psychiatrist call time: 1709  Spoke with Dr. Mccray    Patient is referred by: Police    Reason for visit to ED - Presenting problem:     PT states reason for ED visit, \" I want to die, I'm sick of living, tired of the home I'm tired of living, and living with my son, per patient.\" Reports getting into an argument with his son, whom is a horder, per patient, told him to get on his side of the house.\" Reports tired of looking at all the junk, and things stacked taller than him. Patient reports that he feels like its unsafe for him being there, and does his bathing in the sink.  Patient reports that he is able to do all his ADL's on his own. Denies any past attempts, homicidal ideations, hallucinations, and paranoia. Reports being depressed, hard time getting out of bed, due to depression, and health, per patient. Suicidal thoughts to shoot self with gun, has means, and intent. Denies past attempts, and past inpatient treatment at [facility]. Patient states that he cannot get thought out of his head, and has gun in his truck. Reports feeling like he has lived his life already. Psychiatry recommendations for [facility].  Ed, Provider Notes:  Babak Lisa is a 83 y.o. male who presents to the emergency department for evaluation regarding feelings of depression and thoughts about wanting to harm himself.  Patient arrived here after speaking with the 's department.  Stated that he was having thoughts about wanting to harm himself and was threatening to use a gun.  Patient resides at home with his son and states that he just does not want to be here anymore.  Reports that he recently had an inpatient

## 2024-08-31 NOTE — ED NOTES
Gaviota from [facility] alerted me that pt is to be transferred for geropsych. Dr Jara notified, awaiting provider note to initiate transfer

## 2024-08-31 NOTE — ED PROVIDER NOTES
[facility] EMERGENCY DEPT  eMERGENCY dEPARTMENT eNCOUnter      Pt Name: Babak Lisa  MRN: [id number]  Birthdate 1940  Date of evaluation: 8/31/2024  Provider: Mark Jara MD    CHIEF COMPLAINT       Chief Complaint   Patient presents with    Mental Health Problem     Pt was S.I. Threatening to use a gun on himself. [facility] \"I do not want to be here anymore\".         HISTORY OF PRESENT ILLNESS   (Location/Symptom, Timing/Onset,Context/Setting, Quality, Duration, Modifying Factors, Severity)  Note limiting factors.   Babak Lisa is a 83 y.o. male who presents to the emergency department for evaluation regarding feelings of depression and thoughts about wanting to harm himself.  Patient arrived here after speaking with the [facility]'s department.  Stated that he was having thoughts about wanting to harm himself and was threatening to use a gun.  Patient resides at home with his son and states that he just does not want to be here anymore.  Reports that he recently had an inpatient psychiatric hospitalization.  Denies recent illnesses, fever or chills.    HPI    NursingNotes were reviewed.    REVIEW OF SYSTEMS    (2-9 systems for level 4, 10 or more for level 5)     Review of Systems   Constitutional:  Negative for chills and fever.   Respiratory:  Negative for cough and shortness of breath.    Cardiovascular:  Negative for chest pain.   Gastrointestinal:  Negative for abdominal pain.   Psychiatric/Behavioral:  Positive for sleep disturbance and suicidal ideas.    All other systems reviewed and are negative.           PAST MEDICALHISTORY   History reviewed. No pertinent past medical history.      SURGICAL HISTORY     History reviewed. No pertinent surgical history.      CURRENT MEDICATIONS     Previous Medications    No medications on file       ALLERGIES     Patient has no known allergies.    FAMILY HISTORY     History reviewed. No pertinent family history.       SOCIAL HISTORY       Social History

## 2024-09-01 LAB
MAGNESIUM SERPL-MCNC: 2 MG/DL (ref 1.6–2.4)
TSH SERPL DL<=0.005 MIU/L-ACNC: 3.99 UIU/ML (ref 0.27–4.2)
VIT B12 SERPL-MCNC: 1194 PG/ML (ref 232–1245)

## 2024-09-01 PROCEDURE — 36415 COLL VENOUS BLD VENIPUNCTURE: CPT

## 2024-09-01 PROCEDURE — 83735 ASSAY OF MAGNESIUM: CPT

## 2024-09-01 PROCEDURE — 82607 VITAMIN B-12: CPT

## 2024-09-01 PROCEDURE — 84443 ASSAY THYROID STIM HORMONE: CPT

## 2024-09-01 RX ORDER — FLUTICASONE PROPIONATE AND SALMETEROL 250; 50 UG/1; UG/1
1 POWDER RESPIRATORY (INHALATION) EVERY 12 HOURS
COMMUNITY

## 2024-09-01 RX ORDER — SALSALATE 500 MG/1
500 TABLET, FILM COATED ORAL 2 TIMES DAILY WITH MEALS
COMMUNITY

## 2024-09-01 RX ORDER — FUROSEMIDE 20 MG
10 TABLET ORAL DAILY
COMMUNITY

## 2024-09-01 RX ORDER — RANOLAZINE 500 MG/1
500 TABLET, EXTENDED RELEASE ORAL 2 TIMES DAILY
COMMUNITY

## 2024-09-01 RX ORDER — LORATADINE 10 MG/1
10 TABLET ORAL DAILY
COMMUNITY

## 2024-09-01 RX ORDER — LEVOTHYROXINE SODIUM 112 UG/1
112 TABLET ORAL DAILY
COMMUNITY

## 2024-09-01 RX ORDER — CITALOPRAM HYDROBROMIDE 40 MG/1
20 TABLET ORAL DAILY
COMMUNITY

## 2024-09-01 RX ORDER — LOSARTAN POTASSIUM 100 MG/1
50 TABLET ORAL 2 TIMES DAILY
COMMUNITY

## 2024-09-01 RX ORDER — ALLOPURINOL 100 MG/1
100 TABLET ORAL DAILY
COMMUNITY

## 2024-09-01 RX ORDER — ATENOLOL 50 MG/1
25 TABLET ORAL DAILY
COMMUNITY

## 2024-09-01 RX ORDER — DIPHENHYDRAMINE HCL 50 MG
50 CAPSULE ORAL NIGHTLY PRN
COMMUNITY

## 2024-09-01 RX ORDER — TAMSULOSIN HYDROCHLORIDE 0.4 MG/1
0.4 CAPSULE ORAL DAILY
COMMUNITY

## 2024-09-01 RX ORDER — AMLODIPINE BESYLATE 10 MG/1
10 TABLET ORAL DAILY
COMMUNITY

## 2024-09-01 RX ORDER — CHLORAL HYDRATE 500 MG
1000 CAPSULE ORAL DAILY
COMMUNITY

## 2024-09-01 RX ORDER — PANTOPRAZOLE SODIUM 40 MG/1
40 TABLET, DELAYED RELEASE ORAL 2 TIMES DAILY
COMMUNITY

## 2024-09-01 RX ORDER — LANOLIN ALCOHOL/MO/W.PET/CERES
400 CREAM (GRAM) TOPICAL DAILY
COMMUNITY

## 2024-09-01 RX ORDER — CALCITRIOL 0.25 UG/1
0.25 CAPSULE, LIQUID FILLED ORAL SEE ADMIN INSTRUCTIONS
COMMUNITY

## 2024-09-01 RX ORDER — NITROGLYCERIN 0.4 MG/1
0.4 TABLET SUBLINGUAL EVERY 5 MIN PRN
COMMUNITY

## 2024-09-01 RX ORDER — ATORVASTATIN CALCIUM 40 MG/1
40 TABLET, FILM COATED ORAL DAILY
COMMUNITY

## 2024-09-01 RX ORDER — ASPIRIN 81 MG/1
81 TABLET ORAL DAILY
COMMUNITY

## 2024-09-01 RX ORDER — SIMVASTATIN 40 MG
20 TABLET ORAL NIGHTLY
COMMUNITY

## 2024-09-01 RX ORDER — HYDRALAZINE HYDROCHLORIDE 100 MG/1
100 TABLET, FILM COATED ORAL 2 TIMES DAILY
COMMUNITY

## 2024-09-01 NOTE — ED NOTES
Patient sitting on the side of the bed at this time. Patient stated he needed to change positions because his back was hurting. Sitter at bedside.

## 2024-09-01 NOTE — ED NOTES
Contacted by Tabitha from [facility].  She states their provider declined patient to be transferred to their facility.

## 2024-09-02 VITALS
BODY MASS INDEX: 20.94 KG/M2 | OXYGEN SATURATION: 94 % | HEIGHT: 73 IN | SYSTOLIC BLOOD PRESSURE: 152 MMHG | DIASTOLIC BLOOD PRESSURE: 82 MMHG | WEIGHT: 158 LBS | HEART RATE: 74 BPM | TEMPERATURE: 98.1 F | RESPIRATION RATE: 18 BRPM

## 2024-09-02 LAB
BACTERIA UR CULT: NORMAL
EKG P AXIS: NORMAL DEGREES
EKG P-R INTERVAL: NORMAL MS
EKG Q-T INTERVAL: 394 MS
EKG QRS DURATION: 86 MS
EKG QTC CALCULATION (BAZETT): 418 MS
EKG T AXIS: 13 DEGREES

## 2024-09-02 PROCEDURE — 93010 ELECTROCARDIOGRAM REPORT: CPT | Performed by: INTERNAL MEDICINE

## 2024-09-02 NOTE — ED NOTES
Brief change and incontinence care for bowel movement completed (soap and water used). Catheter emptied (350 ml). Bed sheet changed. Pt ambulated in ED halls for approximately 5 minutes. Pt used wheelchair for standing assistance. Pt tolerated well. Sitter at bedside. Pt in chair eating pudding with call light in reach.

## 2024-09-02 NOTE — ED NOTES
Contacted [facility] through their ER. I spoke with RN who gave me the Referral Line  contact number ([phone]). I left voice mail on this line for Intake on call Shelly to return my call

## 2024-09-02 NOTE — ED NOTES
Pt received bed bath (soap and water),catheter care (soap and water/ 200 ml output), oral care (mouth swab with drop of toothpaste and lip moisturizer), and brief change.  Linen change and gown change completed.  2 warm blankets provided for comfort. Pt ambulated in ED halls for approximately 5 minutes. Pt used wheelchair for standing assistance. Pt tolerated well. Sitter at bedside. Pt calm and in bed with eyes closed. Vitals completed 2124.

## 2024-09-03 NOTE — ED NOTES
Access center called wanting an update on placement of pt. Access center updated that pt was transferred to [facility].

## 2024-09-04 ENCOUNTER — TELEPHONE (OUTPATIENT)
Dept: PSYCHIATRY | Age: 84
End: 2024-09-04

## 2024-09-04 NOTE — TELEPHONE ENCOUNTER
Paz from [facility] called the office thinking that the pt was seen in our office. MA informed Paz that he was not a pt of ours    Electronically signed by Kallie Khan on 9/4/2024 at 10:32 AM

## 2024-11-02 NOTE — TELEPHONE ENCOUNTER
Caller: Rajinder Riggs    Relationship to patient: Self    Best call back number: [phone]     Chief complaint: HOSPITAL FOLLOW UP    Type of visit: HOSPITAL FOLLOW UP    Requested date: ASAP     If rescheduling, when is the original appointment:      Additional notes: PATIENT RECEIVED A CALL TO RESCHEDULE HIS APPOINTMENT THAT WAS CANCELLED ON 1.31.24. DR. MCCOY NEXT AVAILABLE WAS 9.18.24 AND [facility] NEXT AVAILABLE WAS 4.2.24.            No

## 2025-01-21 ENCOUNTER — OFFICE VISIT (OUTPATIENT)
Dept: CARDIOLOGY | Facility: CLINIC | Age: 85
End: 2025-01-21
Payer: MEDICARE

## 2025-01-21 VITALS
SYSTOLIC BLOOD PRESSURE: 134 MMHG | BODY MASS INDEX: 21.12 KG/M2 | OXYGEN SATURATION: 96 % | DIASTOLIC BLOOD PRESSURE: 66 MMHG | HEIGHT: 73 IN | HEART RATE: 90 BPM | WEIGHT: 159.4 LBS

## 2025-01-21 DIAGNOSIS — I35.0 NONRHEUMATIC AORTIC VALVE STENOSIS: ICD-10-CM

## 2025-01-21 DIAGNOSIS — J44.9 CHRONIC OBSTRUCTIVE PULMONARY DISEASE, UNSPECIFIED COPD TYPE: ICD-10-CM

## 2025-01-21 DIAGNOSIS — I48.91 ATRIAL FIBRILLATION, NEW ONSET: Primary | ICD-10-CM

## 2025-01-21 DIAGNOSIS — I10 HTN (HYPERTENSION), BENIGN: ICD-10-CM

## 2025-01-21 DIAGNOSIS — I25.119 ATHEROSCLEROSIS OF CORONARY ARTERY OF NATIVE HEART WITH ANGINA PECTORIS, UNSPECIFIED VESSEL OR LESION TYPE: ICD-10-CM

## 2025-01-21 DIAGNOSIS — Z71.6 TOBACCO ABUSE COUNSELING: ICD-10-CM

## 2025-01-21 DIAGNOSIS — N18.32 STAGE 3B CHRONIC KIDNEY DISEASE: ICD-10-CM

## 2025-01-21 DIAGNOSIS — R06.09 DOE (DYSPNEA ON EXERTION): ICD-10-CM

## 2025-01-21 DIAGNOSIS — I35.1 NONRHEUMATIC AORTIC VALVE INSUFFICIENCY: ICD-10-CM

## 2025-01-21 NOTE — PROGRESS NOTES
"    Subjective:     Encounter Date:01/21/2025      Patient ID: Rajinder Riggs is a 84 y.o. male.    Chief Complaint: follow up CAD, also new AF    History of Present Illness    The patient presents to follow-up regarding his coronary artery disease.  He has multivessel disease and was ultimately turned down for CABG. He also has moderate AI and mild to moderate AS.    As the patient was continuing to have anginal type symptoms including fatigue, poor stamina and exertional dyspnea despite maximally tolerated antianginal therapy Dr. Zacarias did perform PCI January 2024 to include 3 drug-eluting stents to the RCA and 1 drug-eluting stent to the circumflex/OM 2.    He has a normal LVEF.  He is a smoker.  He has COPD.    It was recommended he follow-up 2 to 4 weeks after PCI in January 2024 but he returns today for his first follow-up since that time.    By way of review, he was hospitalized at [place] in August with what was diagnosed as an acute exacerbation of COPD with acute hypoxic respiratory failure and also colitis.  Notes also indicate he was diagnosed with atrial fibrillation that admission but this \"resolved with a dose of IV Lopressor.\"  Echocardiogram and EKG were reportedly performed but these records are not available to me.  It is also unclear whether or not he was anticoagulated at discharge.  The patient tells me he continues to take aspirin and Brilinta and no other blood thinners.    He reports after PCI a year ago he did notice improvement in his energy level and stamina.  He states he continues to maintain this.  However, he also tells me he is very deconditioned and sits at home and does not participate in much activity.  He states he quit smoking for a little while and this improved his breathing but he has since resumed smoking.  He reports gradually worsening exertional dyspnea since his last visit.  He reports weight loss.  He denies chest pain, orthopnea, PND, palpitations, syncope or " presyncope.  As far as he knows his blood pressure is well-controlled.    He tells me he has no knowledge of a prior atrial fibrillation diagnosis and he feels he is symptomatically unaware of this.    He denies any bleeding problems.    He states he lives at home with his son, but his son cannot drive.  He states he cares for himself and manages his own medications.    The following portions of the patient's history were reviewed and updated as appropriate: allergies, current medications, past family history, past medical history, past social history, past surgical history and problem list.    Review of Systems   Constitutional: Positive for malaise/fatigue and weight loss.   Cardiovascular:  Positive for dyspnea on exertion. Negative for chest pain, claudication, leg swelling, near-syncope, orthopnea, palpitations, paroxysmal nocturnal dyspnea and syncope.   Respiratory:  Positive for shortness of breath.    Hematologic/Lymphatic: Does not bruise/bleed easily.   Musculoskeletal:  Negative for falls.   Gastrointestinal:  Negative for bloating.   Neurological:  Positive for weakness. Negative for dizziness and light-headedness.       No Known Allergies    Current Outpatient Medications:     albuterol sulfate  (90 Base) MCG/ACT inhaler, Inhale 2 puffs 4 (Four) Times a Day., Disp: , Rfl:     allopurinol (ZYLOPRIM) 100 MG tablet, Take 1 tablet by mouth Daily., Disp: , Rfl:     amLODIPine (NORVASC) 10 MG tablet, Take 1 tablet by mouth Daily., Disp: , Rfl:     apixaban (ELIQUIS) 2.5 MG tablet tablet, Take 1 tablet by mouth 2 (Two) Times a Day., Disp: 60 tablet, Rfl: 11    aspirin 81 MG EC tablet, Take 1 tablet by mouth Daily., Disp: , Rfl:     atenolol (TENORMIN) 50 MG tablet, Take 0.5 tablets by mouth Daily., Disp: , Rfl:     atorvastatin (LIPITOR) 80 MG tablet, Take 0.5 tablets by mouth Daily., Disp: , Rfl:     calcitriol (ROCALTROL) 0.25 MCG capsule, Take 1 capsule by mouth Daily., Disp: , Rfl:     citalopram  "(CeleXA) 40 MG tablet, Take 1 tablet by mouth Daily., Disp: , Rfl:     diphenhydrAMINE (BENADRYL) 50 MG capsule, Take 1 capsule by mouth At Night As Needed for Itching or Sleep., Disp: , Rfl:     fluticasone (FLONASE) 50 MCG/ACT nasal spray, 2 sprays into the nostril(s) as directed by provider Daily., Disp: 9.9 mL, Rfl: 0    Fluticasone-Salmeterol (ADVAIR/WIXELA) 250-50 MCG/ACT DISKUS, Inhale 1 puff 2 (Two) Times a Day., Disp: , Rfl:     Lactobacillus (PROBIOTIC ACIDOPHILUS PO), Take 2 capsules by mouth 2 (Two) Times a Day., Disp: , Rfl:     levothyroxine (SYNTHROID, LEVOTHROID) 112 MCG tablet, Take 1 tablet by mouth Daily., Disp: , Rfl:     loratadine (CLARITIN) 10 MG tablet, Take 1 tablet by mouth Daily As Needed for Allergies., Disp: , Rfl:     losartan (COZAAR) 100 MG tablet, Take 0.5 tablets by mouth 2 (Two) Times a Day., Disp: , Rfl:     nitroglycerin (NITROSTAT) 0.4 MG SL tablet, Place 1 tablet under the tongue Every 5 (Five) Minutes As Needed for Chest Pain (Systolic BP Greater Than 100). Take no more than 3 doses in 15 minutes., Disp: 25 tablet, Rfl: 1    Omega-3 Fatty Acids (fish oil) 1000 MG capsule capsule, Take 1 capsule by mouth Daily With Breakfast., Disp: , Rfl:     pantoprazole (PROTONIX) 40 MG EC tablet, Take 1 tablet by mouth 2 (Two) Times a Day., Disp: , Rfl:     ranolazine (Ranexa) 500 MG 12 hr tablet, Take 1 tablet by mouth 2 (Two) Times a Day., Disp: 60 tablet, Rfl: 11    tamsulosin (FLOMAX) 0.4 MG capsule 24 hr capsule, Take 1 capsule by mouth Daily., Disp: 30 capsule, Rfl: 11    vitamin B-12 (CYANOCOBALAMIN) 1000 MCG tablet, Take 1 tablet by mouth Daily., Disp: , Rfl:          Objective:    /66   Pulse 90   Ht 185.4 cm (73\")   Wt 72.3 kg (159 lb 6.4 oz)   SpO2 96%   BMI 21.03 kg/m²        Vitals and nursing note reviewed.   Constitutional:       General: Not in acute distress.     Appearance: Well-developed and not in distress. Frail. Chronically ill-appearing. Not diaphoretic. "   Neck:      Vascular: No JVD.   Pulmonary:      Effort: Pulmonary effort is normal. No respiratory distress.      Breath sounds: Decreased breath sounds present.   Cardiovascular:      Normal rate. Irregularly irregular rhythm.      Murmurs: There is no murmur.   Edema:     Peripheral edema absent.   Abdominal:      Tenderness: There is no abdominal tenderness.   Skin:     General: Skin is warm and dry.   Neurological:      Mental Status: Alert and oriented to person, place, and time.         Lab Review:   Lab Results   Component Value Date    GLUCOSE 93 01/30/2024    BUN 46 (H) 01/30/2024    CREATININE 2.01 (H) 01/30/2024     01/30/2024    K 3.5 01/30/2024    CL 98 01/30/2024    CALCIUM 9.4 01/30/2024    PROTEINTOT 7.2 01/30/2024    ALBUMIN 3.6 01/30/2024    ALT 10 01/30/2024    AST 22 01/30/2024    ALKPHOS 56 01/30/2024    BILITOT 0.5 01/30/2024    GLOB 3.6 01/30/2024    AGRATIO 1.0 01/30/2024    BCR 22.9 01/30/2024    ANIONGAP 10.0 01/30/2024    EGFR 32.3 (L) 01/30/2024        Lab Results   Component Value Date    CHOL 90 01/10/2024    TRIG 68 01/10/2024    HDL 33 (L) 01/10/2024    LDL 42 01/10/2024        Lab Results   Component Value Date    HGBA1C 5.20 01/10/2024        Lab Results   Component Value Date    WBC 10.09 01/30/2024    HGB 9.9 (L) 01/30/2024    HCT 29.8 (L) 01/30/2024    MCV 90.9 01/30/2024     01/30/2024              ECG 12 Lead    Date/Time: 1/21/2025 4:07 PM  Performed by: Alana Ortega APRN    Authorized by: Alana Ortega APRN  Comparison: compared with previous ECG from 1/30/2024  Comparison to previous ECG: Afib replaces sinus bradycardia   Rhythm: atrial fibrillation  BPM: 90  Other findings: non-specific ST-T wave changes    Clinical impression: abnormal EKG          Results for orders placed during the hospital encounter of 05/30/23    Adult Stress Echo W/ Cont or Stress Agent if Necessary Per Protocol    Interpretation Summary    High risk stress test.    Multiple wall  segments, as outlined below, appear hypokinetic to akinetic in response to dobutamine infusion.    The following left ventricular wall segments are hypokinetic: apical anterior and mid anterolateral. The following left ventricular wall segments are akinetic: apical lateral and apex.    1/2024 cath report:    Impression:  1. Three vessel coronary artery disease, not significantly changed compared to last diagnostic angiograms from May 31, 2023.  2. Successful complex percutaneous coronary intervention to sequential and severely calcified lesions throughout the right coronary artery, requiring use of intravascular lithotripsy to facilitate full expansion of lesions. Drug-eluting stents placed included a 2.5 x 12 to the distal vessel, 3.25 x 15 to the mid vessel, and a 2.5 x 12 covering the ostium.   3. Successful percutaneous coronary convention to the mid circumflex/OM 2, using a 2.5 x 33 mm drug-eluting stent, postdilated to 3.0 proximally   4. Mildly elevated LVEDP (16mmHg) after 500+ cc of IVF given prior to procedure  5. Chronic kidney disease, stage IIIb    Plan:   1. Two hours bedrest, with overnight observation on telemetry and continuation of IV fluids for another 4 hours  2. Continue aspirin 81mg daily indefinitely, and brilinta 90mg bid for at least 12 months.  3. Aggressive risk factor modification, including high potency statin, ACE, and BB.  4. Tobacco cessation once again encouraged.  5. Follow-up with me in 2-4 weeks to discuss symptomatic response to interventions, and use this as a guide for whether more interventions are needed. If so, would likely require Impella for support (though bilateral iliofemoral systems today appeared severely calcified fluoroscopically) and certainly require atherectomy or intravascular lithotripsy to a severely calcified proximal LAD lesion, and also perhaps for ostial circumflex disease. Because of this potential need for further intervention, we will hold off on  cardiac rehab referral at this point in time. If, ultimately, symptoms have significantly improved after today's intervention is such that further interventions are not required, a referral for cardiac rehab will then be placed at that time  6. Also recommend PCP follow-up within a week for repeat creatinine check    You Zacarias MD     5/2023 echo:      Left ventricular systolic function is normal. Left ventricular ejection fraction appears to be 66 - 70%.    Calcified trileaflet aortic valve, with moderate aortic valve regurgitation and mild-moderate aortic stenosis.    The left atrial cavity is dilated.    Estimated right ventricular systolic pressure from tricuspid regurgitation is mildly elevated (35-45 mmHg).    Normal size and function of the right ventricle.    No other significant (greater than mild) valvular pathology.    No prior studies available for comparison.     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, unspecified vessel or lesion type    Overview     [facility] 5/31/23 for [facility]A:  Severe left main plus three-vessel coronary artery disease         Nonrheumatic aortic valve stenosis    Relevant Orders    Adult Transthoracic Echo Complete w/ Color, Spectral and Contrast if necessary per protocol    Nonrheumatic aortic valve insufficiency    Relevant Orders    Adult Transthoracic Echo Complete w/ Color, Spectral and Contrast if necessary per protocol    CURRY (dyspnea on exertion)    Relevant Orders    Adult Transthoracic Echo Complete w/ Color, Spectral and Contrast if necessary per protocol    Atrial fibrillation, new onset - Primary    Relevant Orders    Holter Monitor - 72 Hour Up To 15 Days    Adult Transthoracic Echo Complete w/ Color, Spectral and Contrast if necessary per protocol        Genitourinary and Reproductive     Stage 3b chronic kidney disease       Pulmonary and Pneumonias    COPD (chronic obstructive pulmonary disease)       Tobacco    Tobacco abuse counseling       Plan:     1.  Coronary artery disease: Established problem, stable.  He reports improvement in fatigue after PCI 1 year ago.  Although he admits deconditioning and inactivity he states he has maintained that improvement over the past year.  He denies chest pain.  He admits gradually worsening exertional dyspnea but does also have lung disease and continues to smoke and is now in afib.  He is euvolemic on exam.    -Continue aspirin 81 mg daily indefinitely without interruption  -Discontinue Brilinta as he is now 1 year out from PCI and we are initiating anticoagulation for atrial fibrillation.  -Continue Ranexa, beta-blocker, high intensity statin  -PRN SL NTG  -As outlined per Dr. Zacarias in cath report if he has recurrent or worsening anginal equivalents, further PCI may be considered in the future    2.  Atrial fibrillation: New diagnosis.  Notes from [place] indicate he had atrial fibrillation when hospitalized there in August, but I do not have EKGs to confirm this.  For unclear reasons it appears he was not started on anticoagulation.  He is in rate controlled atrial fibrillation today.  He appears symptomatically unaware of this although this could contribute to fatigue and exertional dyspnea.    -Start Eliquis 2.5 mg twice daily for stroke prophylaxis  -Continue current dose of beta-blocker for rate control  -14-day Holter monitor today.  If atrial fibrillation is persistent we will plan for cardioversion after 4 weeks of uninterrupted anticoagulation to see if he has any symptomatic improvement with restoration of normal sinus rhythm.    3.  Aortic stenosis and aortic valve insufficiency: Repeat echocardiogram for continued surveillance of this and due to his gradually worsening exertional dyspnea    4.  Tobacco abuse:  Counseled on the importance of cessation.    I have tentatively ordered a follow-up for 6 to 8 weeks.  We will be in touch sooner with echo and Holter results and any subsequent changes in the plan of care.    I spent 38 minutes caring for Rajinder on this date of service. This time includes time spent by me in the following activities: preparing for the visit, reviewing tests, performing a medically appropriate examination and/or evaluation, counseling and educating the patient/family/caregiver, documenting information in the medical record, ordering medications, and ordering test(s)

## 2025-02-19 LAB
CV ZIO AF AVG BPM: 92 BPM
CV ZIO AF BPM HIGH: 192 BPM
CV ZIO AF BPM LOW: 44 BPM
CV ZIO AF F EPI AVG BPM: 136 BPM
CV ZIO AF F EPI BPM HIGH: 192 BPM
CV ZIO AF F EPI BPM LOW: 87 BPM
CV ZIO AF F EPI DT: NORMAL
CV ZIO AF PERCENT: 100 %
CV ZIO AF S EPI AVG BPM: 65 BPM
CV ZIO AF S EPI BPM HIGH: 89 BPM
CV ZIO AF S EPI BPM LOW: 44 BPM
CV ZIO AF S EPI DT: NORMAL
CV ZIO BASELINE AVG BPM: 92 BPM
CV ZIO BASELINE BPM HIGH: 218 BPM
CV ZIO BASELINE BPM LOW: 44 BPM
CV ZIO DEVICE ANALYSIS TIME: NORMAL
CV ZIO ECT SVE COUNT: 0 EPISODES
CV ZIO ECT SVE CPLT COUNT: 0 EPISODES
CV ZIO ECT SVE CPLT FREQ: 0
CV ZIO ECT SVE FREQ: 0
CV ZIO ECT SVE TPLT COUNT: 0 EPISODES
CV ZIO ECT SVE TPLT FREQ: 0
CV ZIO ECT VE COUNT: NORMAL EPISODES
CV ZIO ECT VE CPLT COUNT: 3111 EPISODES
CV ZIO ECT VE CPLT FREQ: NORMAL
CV ZIO ECT VE FREQ: NORMAL
CV ZIO ECT VE TPLT COUNT: 234 EPISODES
CV ZIO ECT VE TPLT FREQ: NORMAL
CV ZIO ECTOPIC SVE COUPLET RAW PERCENT: 0 %
CV ZIO ECTOPIC SVE ISOLATED PERCENT: 0 %
CV ZIO ECTOPIC SVE TRIPLET RAW PERCENT: 0 %
CV ZIO ECTOPIC VE COUPLET RAW PERCENT: 0.35 %
CV ZIO ECTOPIC VE ISOLATED PERCENT: 4.22 %
CV ZIO ECTOPIC VE TRIPLET RAW PERCENT: 0.04 %
CV ZIO ENROLLMENT END: NORMAL
CV ZIO ENROLLMENT START: NORMAL
CV ZIO IRREG TYPE: NORMAL
CV ZIO L BIGEMINY DUR: 8.1 SEC
CV ZIO L BIGEMINY END: NORMAL
CV ZIO L BIGEMINY START: NORMAL
CV ZIO L TRIGEMINY DUR: 12.1 SEC
CV ZIO L TRIGEMINY END: NORMAL
CV ZIO L TRIGEMINY START: NORMAL
CV ZIO PATIENT EVENTS DIARIES: 0
CV ZIO PATIENT EVENTS TRIGGERS: 0
CV ZIO PAUSE COUNT: 0
CV ZIO PRESCRIPTION STATUS: NORMAL
CV ZIO SVT COUNT: 0
CV ZIO TOTAL  ENROLLMENT PERIOD: NORMAL
CV ZIO VT AVG BPM: 137 BPM
CV ZIO VT BPM HIGH: 218 BPM
CV ZIO VT BPM LOW: 87 BPM
CV ZIO VT COUNT: 10
CV ZIO VT F EPI AVG BPM: 161
CV ZIO VT F EPI BEATS: 5 BEATS
CV ZIO VT F EPI BPM HIGH: 218
CV ZIO VT F EPI BPM LOW: 113
CV ZIO VT F EPI DUR: 1.7 SEC
CV ZIO VT F EPI END: NORMAL
CV ZIO VT F EPI START: NORMAL
CV ZIO VT L EPI AVG BPM: 132
CV ZIO VT L EPI BEATS: 13 BEATS
CV ZIO VT L EPI BPM HIGH: 141 BPM
CV ZIO VT L EPI BPM LOW: 125 BPM
CV ZIO VT L EPI DUR: 5.7
CV ZIO VT L EPI END: NORMAL
CV ZIO VT L EPI START: NORMAL

## 2025-03-10 DIAGNOSIS — I48.19 ATRIAL FIBRILLATION, PERSISTENT: Primary | ICD-10-CM

## 2025-03-10 RX ORDER — SODIUM CHLORIDE 0.9 % (FLUSH) 0.9 %
10 SYRINGE (ML) INJECTION AS NEEDED
OUTPATIENT
Start: 2025-03-10

## 2025-03-10 RX ORDER — SODIUM CHLORIDE 0.9 % (FLUSH) 0.9 %
10 SYRINGE (ML) INJECTION EVERY 12 HOURS SCHEDULED
OUTPATIENT
Start: 2025-03-10

## 2025-03-10 RX ORDER — SODIUM CHLORIDE 9 MG/ML
40 INJECTION, SOLUTION INTRAVENOUS AS NEEDED
OUTPATIENT
Start: 2025-03-10

## 2025-03-20 ENCOUNTER — HOSPITAL ENCOUNTER (OUTPATIENT)
Dept: CARDIOLOGY | Facility: HOSPITAL | Age: 85
Discharge: HOME | End: 2025-03-20
Payer: MEDICARE

## 2025-03-20 NOTE — PROGRESS NOTES
Amalia RN spoke with patient in regards to coming in for cardioversion appointment with Dr. Zacarias. Patient explained to nurse that he has been sitting at home for two hours sick, throwing up. Nurse advised patient to go to ER. Patient refused and stated his son is with him.

## 2025-08-01 ENCOUNTER — OFFICE VISIT (OUTPATIENT)
Dept: WOUND CARE | Facility: HOSPITAL | Age: 85
End: 2025-08-01
Payer: OTHER GOVERNMENT

## 2025-08-01 PROCEDURE — G0463 HOSPITAL OUTPT CLINIC VISIT: HCPCS

## 2025-08-25 ENCOUNTER — OFFICE VISIT (OUTPATIENT)
Dept: WOUND CARE | Facility: HOSPITAL | Age: 85
End: 2025-08-25
Payer: OTHER GOVERNMENT

## (undated) DEVICE — YANKAUER,BULB TIP WITH VENT: Brand: ARGYLE

## (undated) DEVICE — PERCLOSE™ PROSTYLE™ SUTURE-MEDIATED CLOSURE AND REPAIR SYSTEM: Brand: PERCLOSE™ PROSTYLE™

## (undated) DEVICE — DEV TORQ GW HOT/PINK

## (undated) DEVICE — HI-TORQUE WHISPER ES GUIDE WIRE .014 STRAIGHTTIP 3.0 CM X 190 CM: Brand: HI-TORQUE WHISPER

## (undated) DEVICE — SUP ARMBRD ART/LINE BLU

## (undated) DEVICE — PINNACLE INTRODUCER SHEATH: Brand: PINNACLE

## (undated) DEVICE — PAD, DEFIB, ADULT, RADIOTRANS, PHYSIO: Brand: MEDLINE

## (undated) DEVICE — 6F .070 JR 4 SH 100CM: Brand: VISTA BRITE TIP

## (undated) DEVICE — SOL IRR NACL 0.9PCT BT 1000ML

## (undated) DEVICE — Device

## (undated) DEVICE — PK CATH CARD 30 CA/4

## (undated) DEVICE — Device: Brand: DEFENDO AIR/WATER/SUCTION AND BIOPSY VALVE

## (undated) DEVICE — TBG SMPL FLTR LINE NASL 02/C02 A/ BX/100

## (undated) DEVICE — CANN NASL ETCO2 LO/FLO A/

## (undated) DEVICE — NC TREK NEO™ CORONARY DILATATION CATHETER 2.50 X 12 MM / RAPID-EXCHANGE: Brand: NC TREK NEO™

## (undated) DEVICE — CATH BALN LITHO INTRAVASC CORNRY 6F 138CM 3X12MM

## (undated) DEVICE — Device: Brand: MEDEX

## (undated) DEVICE — NC TREK NEO™ CORONARY DILATATION CATHETER 3.00 MM X 12 MM / RAPID-EXCHANGE: Brand: NC TREK NEO™

## (undated) DEVICE — ENDOGATOR AUXILIARY WATER JET CONNECTOR: Brand: ENDOGATOR

## (undated) DEVICE — INF TL MULIPACK FR6: Brand: INFINITI

## (undated) DEVICE — KT VLV HEMO MAP ACC PLS LG/BORE MTL/INTRO

## (undated) DEVICE — CUFF,BP,DISP,1 TUBE,ADULT,HP: Brand: MEDLINE

## (undated) DEVICE — THE CHANNEL CLEANING BRUSH IS A NYLON FLEXI BRUSH ATTACHED TO A FLEXIBLE PLASTIC SHEATH DESIGNED TO SAFELY REMOVE DEBRIS FROM FLEXIBLE ENDOSCOPES.

## (undated) DEVICE — SOLIDIFIER LIQUI LOC PLUS 2000CC

## (undated) DEVICE — BALN NC/EUPHORA RX 2.00X20MM

## (undated) DEVICE — SENSR O2 OXIMAX FNGR A/ 18IN NONSTR

## (undated) DEVICE — MINI TREK CORONARY DILATATION CATHETER 2.0 MM X 12 MM / RAPID-EXCHANGE: Brand: MINI TREK

## (undated) DEVICE — CONMED SCOPE SAVER BITE BLOCK, 20X27 MM: Brand: SCOPE SAVER

## (undated) DEVICE — DRSNG SURESITE WNDW 4X4.5

## (undated) DEVICE — TR BAND RADIAL ARTERY COMPRESSION DEVICE: Brand: TR BAND

## (undated) DEVICE — 6F .070 AL 1 100CM: Brand: CORDIS

## (undated) DEVICE — PRESSURE MONITORING SET: Brand: TRUWAVE

## (undated) DEVICE — INFLATION DEVICE: Brand: ENCORE™ 26

## (undated) DEVICE — RADIFOCUS OPTITORQUE ANGIOGRAPHIC CATHETER: Brand: OPTITORQUE